# Patient Record
Sex: MALE | Race: BLACK OR AFRICAN AMERICAN | NOT HISPANIC OR LATINO
[De-identification: names, ages, dates, MRNs, and addresses within clinical notes are randomized per-mention and may not be internally consistent; named-entity substitution may affect disease eponyms.]

---

## 2017-02-22 ENCOUNTER — RX RENEWAL (OUTPATIENT)
Age: 77
End: 2017-02-22

## 2017-12-04 ENCOUNTER — APPOINTMENT (OUTPATIENT)
Dept: OPHTHALMOLOGY | Facility: CLINIC | Age: 77
End: 2017-12-04
Payer: MEDICARE

## 2017-12-04 PROCEDURE — 92012 INTRM OPH EXAM EST PATIENT: CPT

## 2018-05-26 ENCOUNTER — EMERGENCY (EMERGENCY)
Facility: HOSPITAL | Age: 78
LOS: 1 days | Discharge: LEFT BEFORE DISCHARGE | End: 2018-05-26
Attending: EMERGENCY MEDICINE | Admitting: EMERGENCY MEDICINE
Payer: MEDICARE

## 2018-05-26 VITALS
OXYGEN SATURATION: 98 % | HEART RATE: 75 BPM | DIASTOLIC BLOOD PRESSURE: 73 MMHG | RESPIRATION RATE: 18 BRPM | SYSTOLIC BLOOD PRESSURE: 134 MMHG | TEMPERATURE: 98 F

## 2018-05-26 VITALS
SYSTOLIC BLOOD PRESSURE: 179 MMHG | HEART RATE: 76 BPM | RESPIRATION RATE: 16 BRPM | OXYGEN SATURATION: 98 % | DIASTOLIC BLOOD PRESSURE: 98 MMHG

## 2018-05-26 LAB
ALBUMIN SERPL ELPH-MCNC: 3.8 G/DL — SIGNIFICANT CHANGE UP (ref 3.3–5)
ALP SERPL-CCNC: 77 U/L — SIGNIFICANT CHANGE UP (ref 40–120)
ALT FLD-CCNC: 7 U/L — SIGNIFICANT CHANGE UP (ref 4–41)
APPEARANCE UR: SIGNIFICANT CHANGE UP
APTT BLD: 30.5 SEC — SIGNIFICANT CHANGE UP (ref 27.5–37.4)
AST SERPL-CCNC: 12 U/L — SIGNIFICANT CHANGE UP (ref 4–40)
BACTERIA # UR AUTO: SIGNIFICANT CHANGE UP
BASE EXCESS BLDV CALC-SCNC: 1.4 MMOL/L — SIGNIFICANT CHANGE UP
BASOPHILS # BLD AUTO: 0.02 K/UL — SIGNIFICANT CHANGE UP (ref 0–0.2)
BASOPHILS NFR BLD AUTO: 0.4 % — SIGNIFICANT CHANGE UP (ref 0–2)
BILIRUB SERPL-MCNC: 0.4 MG/DL — SIGNIFICANT CHANGE UP (ref 0.2–1.2)
BILIRUB UR-MCNC: NEGATIVE — SIGNIFICANT CHANGE UP
BLOOD GAS VENOUS - CREATININE: 1.05 MG/DL — SIGNIFICANT CHANGE UP (ref 0.5–1.3)
BLOOD UR QL VISUAL: NEGATIVE — SIGNIFICANT CHANGE UP
BUN SERPL-MCNC: 16 MG/DL — SIGNIFICANT CHANGE UP (ref 7–23)
CALCIUM SERPL-MCNC: 8.6 MG/DL — SIGNIFICANT CHANGE UP (ref 8.4–10.5)
CHLORIDE BLDV-SCNC: 110 MMOL/L — HIGH (ref 96–108)
CHLORIDE SERPL-SCNC: 104 MMOL/L — SIGNIFICANT CHANGE UP (ref 98–107)
CK MB BLD-MCNC: 1.77 NG/ML — SIGNIFICANT CHANGE UP (ref 1–6.6)
CK MB BLD-MCNC: SIGNIFICANT CHANGE UP (ref 0–2.5)
CK SERPL-CCNC: 96 U/L — SIGNIFICANT CHANGE UP (ref 30–200)
CO2 SERPL-SCNC: 24 MMOL/L — SIGNIFICANT CHANGE UP (ref 22–31)
COLOR SPEC: YELLOW — SIGNIFICANT CHANGE UP
CREAT SERPL-MCNC: 1.11 MG/DL — SIGNIFICANT CHANGE UP (ref 0.5–1.3)
EOSINOPHIL # BLD AUTO: 0.1 K/UL — SIGNIFICANT CHANGE UP (ref 0–0.5)
EOSINOPHIL NFR BLD AUTO: 1.9 % — SIGNIFICANT CHANGE UP (ref 0–6)
GAS PNL BLDV: 140 MMOL/L — SIGNIFICANT CHANGE UP (ref 136–146)
GLUCOSE BLDV-MCNC: 173 — HIGH (ref 70–99)
GLUCOSE SERPL-MCNC: 177 MG/DL — HIGH (ref 70–99)
GLUCOSE UR-MCNC: 250 — SIGNIFICANT CHANGE UP
HBA1C BLD-MCNC: 7 % — HIGH (ref 4–5.6)
HCO3 BLDV-SCNC: 25 MMOL/L — SIGNIFICANT CHANGE UP (ref 20–27)
HCT VFR BLD CALC: 37.2 % — LOW (ref 39–50)
HCT VFR BLDV CALC: 39 % — SIGNIFICANT CHANGE UP (ref 39–51)
HGB BLD-MCNC: 11.8 G/DL — LOW (ref 13–17)
HGB BLDV-MCNC: 12.7 G/DL — LOW (ref 13–17)
HYALINE CASTS # UR AUTO: SIGNIFICANT CHANGE UP (ref 0–?)
IMM GRANULOCYTES # BLD AUTO: 0.01 # — SIGNIFICANT CHANGE UP
IMM GRANULOCYTES NFR BLD AUTO: 0.2 % — SIGNIFICANT CHANGE UP (ref 0–1.5)
INR BLD: 1.21 — HIGH (ref 0.88–1.17)
KETONES UR-MCNC: SIGNIFICANT CHANGE UP
LACTATE BLDV-MCNC: 1.5 MMOL/L — SIGNIFICANT CHANGE UP (ref 0.5–2)
LEUKOCYTE ESTERASE UR-ACNC: NEGATIVE — SIGNIFICANT CHANGE UP
LYMPHOCYTES # BLD AUTO: 0.95 K/UL — LOW (ref 1–3.3)
LYMPHOCYTES # BLD AUTO: 17.7 % — SIGNIFICANT CHANGE UP (ref 13–44)
MCHC RBC-ENTMCNC: 27.4 PG — SIGNIFICANT CHANGE UP (ref 27–34)
MCHC RBC-ENTMCNC: 31.7 % — LOW (ref 32–36)
MCV RBC AUTO: 86.5 FL — SIGNIFICANT CHANGE UP (ref 80–100)
MONOCYTES # BLD AUTO: 0.46 K/UL — SIGNIFICANT CHANGE UP (ref 0–0.9)
MONOCYTES NFR BLD AUTO: 8.6 % — SIGNIFICANT CHANGE UP (ref 2–14)
MUCOUS THREADS # UR AUTO: SIGNIFICANT CHANGE UP
NEUTROPHILS # BLD AUTO: 3.82 K/UL — SIGNIFICANT CHANGE UP (ref 1.8–7.4)
NEUTROPHILS NFR BLD AUTO: 71.2 % — SIGNIFICANT CHANGE UP (ref 43–77)
NITRITE UR-MCNC: NEGATIVE — SIGNIFICANT CHANGE UP
NRBC # FLD: 0 — SIGNIFICANT CHANGE UP
PCO2 BLDV: 48 MMHG — SIGNIFICANT CHANGE UP (ref 41–51)
PH BLDV: 7.36 PH — SIGNIFICANT CHANGE UP (ref 7.32–7.43)
PH UR: 5.5 — SIGNIFICANT CHANGE UP (ref 4.6–8)
PLATELET # BLD AUTO: 174 K/UL — SIGNIFICANT CHANGE UP (ref 150–400)
PMV BLD: 10.4 FL — SIGNIFICANT CHANGE UP (ref 7–13)
PO2 BLDV: 51 MMHG — HIGH (ref 35–40)
POTASSIUM BLDV-SCNC: 3.6 MMOL/L — SIGNIFICANT CHANGE UP (ref 3.4–4.5)
POTASSIUM SERPL-MCNC: 3.8 MMOL/L — SIGNIFICANT CHANGE UP (ref 3.5–5.3)
POTASSIUM SERPL-SCNC: 3.8 MMOL/L — SIGNIFICANT CHANGE UP (ref 3.5–5.3)
PROT SERPL-MCNC: 6.6 G/DL — SIGNIFICANT CHANGE UP (ref 6–8.3)
PROT UR-MCNC: 100 MG/DL — SIGNIFICANT CHANGE UP
PROTHROM AB SERPL-ACNC: 13.5 SEC — HIGH (ref 9.8–13.1)
RBC # BLD: 4.3 M/UL — SIGNIFICANT CHANGE UP (ref 4.2–5.8)
RBC # FLD: 14.4 % — SIGNIFICANT CHANGE UP (ref 10.3–14.5)
RBC CASTS # UR COMP ASSIST: SIGNIFICANT CHANGE UP (ref 0–?)
SAO2 % BLDV: 84.1 % — SIGNIFICANT CHANGE UP (ref 60–85)
SODIUM SERPL-SCNC: 141 MMOL/L — SIGNIFICANT CHANGE UP (ref 135–145)
SP GR SPEC: 1.03 — SIGNIFICANT CHANGE UP (ref 1–1.04)
SQUAMOUS # UR AUTO: SIGNIFICANT CHANGE UP
TROPONIN T SERPL-MCNC: < 0.06 NG/ML — SIGNIFICANT CHANGE UP (ref 0–0.06)
UROBILINOGEN FLD QL: 2 MG/DL — HIGH
WBC # BLD: 5.36 K/UL — SIGNIFICANT CHANGE UP (ref 3.8–10.5)
WBC # FLD AUTO: 5.36 K/UL — SIGNIFICANT CHANGE UP (ref 3.8–10.5)
WBC UR QL: SIGNIFICANT CHANGE UP (ref 0–?)

## 2018-05-26 PROCEDURE — 71045 X-RAY EXAM CHEST 1 VIEW: CPT | Mod: 26

## 2018-05-26 PROCEDURE — 71275 CT ANGIOGRAPHY CHEST: CPT | Mod: 26

## 2018-05-26 PROCEDURE — 93010 ELECTROCARDIOGRAM REPORT: CPT

## 2018-05-26 PROCEDURE — 74174 CTA ABD&PLVS W/CONTRAST: CPT | Mod: 26

## 2018-05-26 PROCEDURE — 99284 EMERGENCY DEPT VISIT MOD MDM: CPT | Mod: 25,GC

## 2018-05-26 RX ORDER — ASPIRIN/CALCIUM CARB/MAGNESIUM 324 MG
162 TABLET ORAL DAILY
Qty: 0 | Refills: 0 | Status: DISCONTINUED | OUTPATIENT
Start: 2018-05-26 | End: 2018-05-30

## 2018-05-26 RX ORDER — SODIUM CHLORIDE 9 MG/ML
1000 INJECTION INTRAMUSCULAR; INTRAVENOUS; SUBCUTANEOUS ONCE
Qty: 0 | Refills: 0 | Status: COMPLETED | OUTPATIENT
Start: 2018-05-26 | End: 2018-05-26

## 2018-05-26 RX ORDER — ASPIRIN/CALCIUM CARB/MAGNESIUM 324 MG
324 TABLET ORAL DAILY
Qty: 0 | Refills: 0 | Status: DISCONTINUED | OUTPATIENT
Start: 2018-05-26 | End: 2018-05-26

## 2018-05-26 RX ADMIN — SODIUM CHLORIDE 2000 MILLILITER(S): 9 INJECTION INTRAMUSCULAR; INTRAVENOUS; SUBCUTANEOUS at 17:04

## 2018-05-26 RX ADMIN — Medication 162 MILLIGRAM(S): at 17:54

## 2018-05-26 NOTE — ED PROVIDER NOTE - PROGRESS NOTE DETAILS
SHANTANU ATT: Patient notified at time of initial exam true syncope, abnl ekg, plan to admit patient to telemetry. 19:30 Patient returned from CTA, informed by wife patient will not stay. Will recommend wait for CTA result before AMA. Tanmay att: Expressed to patient, patient's wife and 2 family members that I would advise waiting for CTA make sure no internal bleeding before leaving AMA. Patient angry, "If my doctor wants me to be admitted then I will get admitted. Lady I'm gonna walk out of here." Patient aaox3, has capacity. Patient understands he is taking risk of death or permanent disability on himself and as the physician in charge his syncope and abnl ekg warrant admission. Patient demanding AMA papers. States he will see his PCP Breann. PCP Breann called, memorial day weekend, md not in, message left.

## 2018-05-26 NOTE — ED ADULT NURSE NOTE - CHIEF COMPLAINT
The patient is a 77y Male complaining of The patient is a 77y Male complaining of syncopal episode during bbq, x 5minutes, not witnessed.  Pmh of htn, dm non compliant with meds.  Denies cp, sob at the moment. The patient is a 77y Male complaining of syncopal episode during bbq, x 5minutes, not witnessed.  Pmh of htn, dm non compliant with meds.  Denies cp, sob at the moment.  Pt was given 2 asa and 1 sl nitro by ems.

## 2018-05-26 NOTE — ED ADULT TRIAGE NOTE - CHIEF COMPLAINT QUOTE
as per family pt had a syncopal episode that lasted approx 5 mins, and when he came to was altered. pt c/o left groin pain. noncompliant with DM and HTN medication. placed on 4L NC by EMS, 162asa and 1sl nitro, #18 S/L to right wrist. pt diaphoretic in triage.  MD Picc to triage for eval, no code stroke called.

## 2018-05-26 NOTE — ED PROVIDER NOTE - ATTENDING CONTRIBUTION TO CARE
Dr. Donato: I have personally seen and examined this patient at the bedside. I have fully participated in the care of this patient. I have reviewed all pertinent clinical information, including history, physical exam, plan and the Resident's note and agree except as noted. HPI above as by me. PE above as by me. Syncope and collapse, ekg rbb lateral st abnl PLAN labs, xr DISPO admit to telemetry

## 2018-05-26 NOTE — ED ADULT NURSE NOTE - PMH
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  Glaucoma    [place] (hard of hearing)

## 2018-05-26 NOTE — ED ADULT NURSE NOTE - OBJECTIVE STATEMENT
Alert, awake, oriented x3.  EKG - NSR, RBBB.  Placed onto cm.  Seen by MD Donato and Candido.  TBA. Alert, awake, oriented x3.  EKG - NSR, RBBB.  Placed onto cm.  Seen by MD Donato and Candido.  162mg asa given and 1 Liter of NS completed.  TBA.

## 2018-05-26 NOTE — ED PROVIDER NOTE - OBJECTIVE STATEMENT
16:27 Tanmay att: 77M h/o htn noncompliant, dm noncompliant BIBEMS ams and syncope. Per wife earlier today patient himself, c/o lt groin pain, sat down during a bbq. Unwitnessed syncope, daughter found him slumped and unconscious x 5 minutes. No seizure activity noted and no head trauma. Initially drowsy and groggy. EMS arrived gave orange juice, fingerstick afterward was normal, per wife ems bp nl but ems ekg not. Patient nad, aaox3, he recalls left groin pain, "like a pulled a muscle," no bulging mass, does not recall syncope. Per triage note EMS administered 4L NC, 162asa, 1sl nitro, and #18 S/L to right wrist. PMH PSH MED ALL SMOKE PCP PHARMACY 16:27 Tanmay att: 77M h/o htn noncompliant, dm noncompliant BIBEMS ams and syncope. Per wife earlier today patient himself, c/o lt groin pain, sat down during a bbq. Unwitnessed syncope, daughter found him slumped and unconscious x 5 minutes. No seizure activity noted and no head trauma. Initially drowsy and groggy. EMS arrived gave orange juice, fingerstick afterward was normal, per wife ems bp nl but ems ekg not. Patient nad, aaox3, he recalls left groin pain, "like a pulled a muscle," no bulging mass, does not recall syncope. Denies cp, sob, palpitations. Denies fever, dysuria. Per triage note EMS administered 4L NC, 162asa, 1sl nitro, and #18 S/L to right wrist. PMH htn, dm. Neg h/o seizure or syncope PSH b/l cataract, corneal transplant, hand sx MED ALL x SMOKE PCP PHARMACY

## 2018-05-26 NOTE — ED PROVIDER NOTE - PMH
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  Glaucoma    [place] (hard of hearing)

## 2018-05-26 NOTE — ED PROVIDER NOTE - PSH
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

## 2018-05-28 LAB
BACTERIA UR CULT: SIGNIFICANT CHANGE UP
SPECIMEN SOURCE: SIGNIFICANT CHANGE UP

## 2018-05-29 NOTE — ED POST DISCHARGE NOTE - RESULT SUMMARY
KEVIN Thomson: UCX contaminated. Pt seen for syncope and left AMA. Called [phone], spoke to patients wife who took our admin PA number and said they would call back once the pt is home.

## 2018-06-04 ENCOUNTER — APPOINTMENT (OUTPATIENT)
Dept: OPHTHALMOLOGY | Facility: CLINIC | Age: 78
End: 2018-06-04
Payer: MEDICARE

## 2018-06-04 PROCEDURE — 92014 COMPRE OPH EXAM EST PT 1/>: CPT

## 2018-08-03 ENCOUNTER — MEDICATION RENEWAL (OUTPATIENT)
Age: 78
End: 2018-08-03

## 2018-08-03 RX ORDER — MOXIFLOXACIN OPHTHALMIC 5 MG/ML
0.5 SOLUTION/ DROPS OPHTHALMIC 4 TIMES DAILY
Qty: 1 | Refills: 2 | Status: ACTIVE | COMMUNITY
Start: 2018-08-03 | End: 1900-01-01

## 2018-08-03 RX ORDER — PREDNISOLONE ACETATE 10 MG/ML
1 SUSPENSION/ DROPS OPHTHALMIC
Qty: 1 | Refills: 3 | Status: ACTIVE | COMMUNITY
Start: 2018-08-03 | End: 1900-01-01

## 2018-08-06 ENCOUNTER — APPOINTMENT (OUTPATIENT)
Dept: OPHTHALMOLOGY | Facility: CLINIC | Age: 78
End: 2018-08-06
Payer: MEDICARE

## 2018-08-06 DIAGNOSIS — Z94.7 CORNEAL TRANSPLANT STATUS: ICD-10-CM

## 2018-08-06 PROCEDURE — 92014 COMPRE OPH EXAM EST PT 1/>: CPT

## 2018-08-09 ENCOUNTER — APPOINTMENT (OUTPATIENT)
Dept: OPHTHALMOLOGY | Facility: HOSPITAL | Age: 78
End: 2018-08-09

## 2018-08-09 ENCOUNTER — OUTPATIENT (OUTPATIENT)
Dept: OUTPATIENT SERVICES | Facility: HOSPITAL | Age: 78
LOS: 1 days | End: 2018-08-09
Payer: MEDICARE

## 2018-08-09 PROCEDURE — 82962 GLUCOSE BLOOD TEST: CPT

## 2018-08-10 ENCOUNTER — APPOINTMENT (OUTPATIENT)
Dept: OPHTHALMOLOGY | Facility: CLINIC | Age: 78
End: 2018-08-10

## 2018-08-13 ENCOUNTER — APPOINTMENT (OUTPATIENT)
Dept: OPHTHALMOLOGY | Facility: CLINIC | Age: 78
End: 2018-08-13
Payer: MEDICARE

## 2018-08-13 PROCEDURE — 92012 INTRM OPH EXAM EST PATIENT: CPT

## 2018-08-23 DIAGNOSIS — H18.12 BULLOUS KERATOPATHY, LEFT EYE: ICD-10-CM

## 2018-09-14 ENCOUNTER — EMERGENCY (EMERGENCY)
Facility: HOSPITAL | Age: 78
LOS: 1 days | Discharge: HOME | End: 2018-09-14
Attending: EMERGENCY MEDICINE | Admitting: EMERGENCY MEDICINE
Payer: MEDICARE

## 2018-09-14 VITALS
HEART RATE: 77 BPM | TEMPERATURE: 98 F | DIASTOLIC BLOOD PRESSURE: 56 MMHG | RESPIRATION RATE: 18 BRPM | SYSTOLIC BLOOD PRESSURE: 143 MMHG | OXYGEN SATURATION: 99 %

## 2018-09-14 VITALS
SYSTOLIC BLOOD PRESSURE: 137 MMHG | RESPIRATION RATE: 18 BRPM | TEMPERATURE: 98 F | HEART RATE: 66 BPM | DIASTOLIC BLOOD PRESSURE: 70 MMHG | OXYGEN SATURATION: 100 %

## 2018-09-14 LAB
ALBUMIN SERPL ELPH-MCNC: 3.8 G/DL — SIGNIFICANT CHANGE UP (ref 3.3–5)
ALP SERPL-CCNC: 70 U/L — SIGNIFICANT CHANGE UP (ref 40–120)
ALT FLD-CCNC: 8 U/L — SIGNIFICANT CHANGE UP (ref 4–41)
APTT BLD: 20.1 SEC — LOW (ref 27.5–37.4)
AST SERPL-CCNC: 18 U/L — SIGNIFICANT CHANGE UP (ref 4–40)
BASOPHILS # BLD AUTO: 0.01 K/UL — SIGNIFICANT CHANGE UP (ref 0–0.2)
BASOPHILS NFR BLD AUTO: 0.1 % — SIGNIFICANT CHANGE UP (ref 0–2)
BILIRUB SERPL-MCNC: 0.4 MG/DL — SIGNIFICANT CHANGE UP (ref 0.2–1.2)
BUN SERPL-MCNC: 20 MG/DL — SIGNIFICANT CHANGE UP (ref 7–23)
CALCIUM SERPL-MCNC: 8.4 MG/DL — SIGNIFICANT CHANGE UP (ref 8.4–10.5)
CHLORIDE SERPL-SCNC: 108 MMOL/L — HIGH (ref 98–107)
CO2 SERPL-SCNC: 20 MMOL/L — LOW (ref 22–31)
CREAT SERPL-MCNC: 1.15 MG/DL — SIGNIFICANT CHANGE UP (ref 0.5–1.3)
EOSINOPHIL # BLD AUTO: 0.09 K/UL — SIGNIFICANT CHANGE UP (ref 0–0.5)
EOSINOPHIL NFR BLD AUTO: 1.3 % — SIGNIFICANT CHANGE UP (ref 0–6)
GLUCOSE SERPL-MCNC: 150 MG/DL — HIGH (ref 70–99)
HCT VFR BLD CALC: 43.3 % — SIGNIFICANT CHANGE UP (ref 39–50)
HGB BLD-MCNC: 13.7 G/DL — SIGNIFICANT CHANGE UP (ref 13–17)
IMM GRANULOCYTES # BLD AUTO: 0.01 # — SIGNIFICANT CHANGE UP
IMM GRANULOCYTES NFR BLD AUTO: 0.1 % — SIGNIFICANT CHANGE UP (ref 0–1.5)
INR BLD: 1.11 — SIGNIFICANT CHANGE UP (ref 0.88–1.17)
LYMPHOCYTES # BLD AUTO: 1.3 K/UL — SIGNIFICANT CHANGE UP (ref 1–3.3)
LYMPHOCYTES # BLD AUTO: 19.3 % — SIGNIFICANT CHANGE UP (ref 13–44)
MANUAL SMEAR VERIFICATION: YES — SIGNIFICANT CHANGE UP
MCHC RBC-ENTMCNC: 28.5 PG — SIGNIFICANT CHANGE UP (ref 27–34)
MCHC RBC-ENTMCNC: 31.6 % — LOW (ref 32–36)
MCV RBC AUTO: 90.2 FL — SIGNIFICANT CHANGE UP (ref 80–100)
MONOCYTES # BLD AUTO: 0.48 K/UL — SIGNIFICANT CHANGE UP (ref 0–0.9)
MONOCYTES NFR BLD AUTO: 7.1 % — SIGNIFICANT CHANGE UP (ref 2–14)
NEUTROPHILS # BLD AUTO: 4.84 K/UL — SIGNIFICANT CHANGE UP (ref 1.8–7.4)
NEUTROPHILS NFR BLD AUTO: 72.1 % — SIGNIFICANT CHANGE UP (ref 43–77)
NRBC # FLD: 0 — SIGNIFICANT CHANGE UP
PLATELET # BLD AUTO: 139 K/UL — LOW (ref 150–400)
PLATELET CLUMP BLD QL SMEAR: SLIGHT — SIGNIFICANT CHANGE UP
PLATELET COUNT - ESTIMATE: NORMAL — SIGNIFICANT CHANGE UP
PMV BLD: 11.3 FL — SIGNIFICANT CHANGE UP (ref 7–13)
POTASSIUM SERPL-MCNC: 4.3 MMOL/L — SIGNIFICANT CHANGE UP (ref 3.5–5.3)
POTASSIUM SERPL-SCNC: 4.3 MMOL/L — SIGNIFICANT CHANGE UP (ref 3.5–5.3)
PROT SERPL-MCNC: 6.8 G/DL — SIGNIFICANT CHANGE UP (ref 6–8.3)
PROTHROM AB SERPL-ACNC: 12.3 SEC — SIGNIFICANT CHANGE UP (ref 9.8–13.1)
RBC # BLD: 4.8 M/UL — SIGNIFICANT CHANGE UP (ref 4.2–5.8)
RBC # FLD: 14.8 % — HIGH (ref 10.3–14.5)
SODIUM SERPL-SCNC: 143 MMOL/L — SIGNIFICANT CHANGE UP (ref 135–145)
TROPONIN T, HIGH SENSITIVITY: 11 NG/L — SIGNIFICANT CHANGE UP (ref ?–14)
WBC # BLD: 6.73 K/UL — SIGNIFICANT CHANGE UP (ref 3.8–10.5)
WBC # FLD AUTO: 6.73 K/UL — SIGNIFICANT CHANGE UP (ref 3.8–10.5)

## 2018-09-14 PROCEDURE — 70450 CT HEAD/BRAIN W/O DYE: CPT | Mod: 26

## 2018-09-14 PROCEDURE — 99285 EMERGENCY DEPT VISIT HI MDM: CPT | Mod: GC

## 2018-09-14 RX ORDER — TETANUS TOXOID, REDUCED DIPHTHERIA TOXOID AND ACELLULAR PERTUSSIS VACCINE, ADSORBED 5; 2.5; 8; 8; 2.5 [IU]/.5ML; [IU]/.5ML; UG/.5ML; UG/.5ML; UG/.5ML
0.5 SUSPENSION INTRAMUSCULAR ONCE
Qty: 0 | Refills: 0 | Status: COMPLETED | OUTPATIENT
Start: 2018-09-14 | End: 2018-09-14

## 2018-09-14 RX ADMIN — TETANUS TOXOID, REDUCED DIPHTHERIA TOXOID AND ACELLULAR PERTUSSIS VACCINE, ADSORBED 0.5 MILLILITER(S): 5; 2.5; 8; 8; 2.5 SUSPENSION INTRAMUSCULAR at 22:01

## 2018-09-14 NOTE — ED PROVIDER NOTE - PLAN OF CARE
1) Please return to the ED should you have any new or worsening symptoms, worsening pain, develop chest pain, pass out again, or any concerning symptoms  2) Please follow up with Dr. Marques on Monday   3) Please stop taking ENtresto

## 2018-09-14 NOTE — ED PROVIDER NOTE - EYES, MLM
Clear bilaterally, pupils equal, round and reactive to light. EOMI. No scleral icterus. No conjunctival injection. No discharge bilaterally. No nystagmus appreciated bilaterally.

## 2018-09-14 NOTE — ED PROVIDER NOTE - PROGRESS NOTE DETAILS
Dre Mauro (Resident): SPoke with Dr. Marques - saw patient a few days ago - recommended if lytes WNL, not orthostatic, should hold entresto and call his office to be seen on MOnday Dre Mauro (Resident): not orthostatic - d/w family return precautions including CP or SOB - looks well, safe to d/c - will call Dr. DUNBAR on MOnday for apt

## 2018-09-14 NOTE — ED ADULT TRIAGE NOTE - CHIEF COMPLAINT QUOTE
Patient brought to ER from home by EMS for c/o syncopal episode times two. Pt was pale and diaphoretic when BLS first arrived. 20 gauge IVL in left AC with NS infusing and he has a hematoma to forehead after he hit his head. Pt had an episode of urinary incontinence also.  . Vomited times one.

## 2018-09-14 NOTE — ED PROVIDER NOTE - MEDICAL DECISION MAKING DETAILS
Dre Mauro (Resident): 78 y/o male hx of HTN recently started ENtresto p/w syncope 2 hours after taking first dose - back to baseline now, was diaphoretic and urinated self, likely 2/2 to low BP - low concern for PE - did hit head, will CT - no CP or palpitations prior to fall, unlikely MI or arrhythmia -

## 2018-09-14 NOTE — ED ADULT NURSE NOTE - NSIMPLEMENTINTERV_GEN_ALL_ED
Implemented All Fall Risk Interventions:  [place] to call system. Call bell, personal items and telephone within reach. Instruct patient to call for assistance. Room bathroom lighting operational. Non-slip footwear when patient is off stretcher. Physically safe environment: no spills, clutter or unnecessary equipment. Stretcher in lowest position, wheels locked, appropriate side rails in place. Provide visual cue, wrist band, yellow gown, etc. Monitor gait and stability. Monitor for mental status changes and reorient to person, place, and time. Review medications for side effects contributing to fall risk. Reinforce activity limits and safety measures with patient and family.

## 2018-09-14 NOTE — ED PROVIDER NOTE - CARE PLAN
Principal Discharge DX:	Syncope and collapse  Assessment and plan of treatment:	1) Please return to the ED should you have any new or worsening symptoms, worsening pain, develop chest pain, pass out again, or any concerning symptoms  2) Please follow up with Dr. Marques on Monday   3) Please stop taking ENtresto

## 2018-09-14 NOTE — ED PROVIDER NOTE - ATTENDING CONTRIBUTION TO CARE
Attending Note (Ree): patient with syncope.  started an hour after taking new medication, known side effect of which is syncope.  asymptomatic now.  hematoma on right forehead.  ct head, labs, ekg.

## 2018-09-14 NOTE — ED ADULT NURSE NOTE - OBJECTIVE STATEMENT
pt A&Ox3 c/o syncopal episode while at home, pt states he was sitting when he felt light headed and fell, as per wife she heard a thump and when she came down stairs pt was stiff but talking with her, wife also states pt had soiled himself  pt was recently placed on new BP medications, reports blurry vision and dizziness, hematoma with laceration noted to right side of forehead, denies n/v/d MD heller at bedside

## 2018-09-14 NOTE — ED PROVIDER NOTE - OBJECTIVE STATEMENT
76 y/o male hx HTN p/w syncope. Patient recently admitted for syncope. Patient recently started on Entresto and states took it 2 hours ago and then felt himself get abd pain 76 y/o male hx HTN p/w syncope. Patient recently admitted for syncope. Patient recently started on Entresto and states took it 2 hours ago and then felt himself get abd pain and lightheaded, sat in chair, had syncopal event, hit the floor. EMS arrived, patient mentating, FS was 160, then had syncopal event again. Clammy and diaphoretic, EMS lowered patient to floor and improved. PAtient had no shaking episodes but did urinate himself. No CP, SOB, N/V/D, abd pain - looks well now, small lac on the R forehead. Not on AC. 78 y/o male hx HTN p/w syncope. Patient recently admitted for syncope. Patient recently started on Entresto and states took it 2 hours ago and then felt himself get abd pain and lightheaded, sat in chair, had syncopal event, hit the floor. EMS arrived, patient mentating, FS was 160, then had syncopal event again. Clammy and diaphoretic, EMS lowered patient to floor and improved. PAtient had no shaking episodes but did urinate himself. No CP, SOB, N/V/D, abd pain - looks well now, small lac on the R forehead. Not on AC.  PMD: Shelli (Darci)

## 2018-09-16 PROBLEM — E78.5 HYPERLIPIDEMIA, UNSPECIFIED: Chronic | Status: ACTIVE | Noted: 2018-08-09

## 2018-11-07 ENCOUNTER — APPOINTMENT (OUTPATIENT)
Dept: OPHTHALMOLOGY | Facility: CLINIC | Age: 78
End: 2018-11-07
Payer: MEDICARE

## 2018-11-07 PROCEDURE — 92012 INTRM OPH EXAM EST PATIENT: CPT

## 2019-03-06 ENCOUNTER — APPOINTMENT (OUTPATIENT)
Dept: OPHTHALMOLOGY | Facility: CLINIC | Age: 79
End: 2019-03-06
Payer: MEDICARE

## 2019-03-06 DIAGNOSIS — H18.232 SECONDARY CORNEAL EDEMA, LEFT EYE: ICD-10-CM

## 2019-03-06 DIAGNOSIS — H40.9 UNSPECIFIED GLAUCOMA: ICD-10-CM

## 2019-03-06 PROCEDURE — 92012 INTRM OPH EXAM EST PATIENT: CPT

## 2019-07-05 ENCOUNTER — APPOINTMENT (OUTPATIENT)
Dept: OPHTHALMOLOGY | Facility: CLINIC | Age: 79
End: 2019-07-05

## 2020-11-02 ENCOUNTER — APPOINTMENT (OUTPATIENT)
Dept: OPHTHALMOLOGY | Facility: CLINIC | Age: 80
End: 2020-11-02
Payer: MEDICARE

## 2020-11-02 ENCOUNTER — NON-APPOINTMENT (OUTPATIENT)
Age: 80
End: 2020-11-02

## 2020-11-02 PROCEDURE — 92012 INTRM OPH EXAM EST PATIENT: CPT

## 2023-01-01 ENCOUNTER — INPATIENT (INPATIENT)
Facility: HOSPITAL | Age: 83
LOS: 11 days | End: 2023-01-25
Attending: STUDENT IN AN ORGANIZED HEALTH CARE EDUCATION/TRAINING PROGRAM | Admitting: INTERNAL MEDICINE
Payer: MEDICARE

## 2023-01-01 VITALS
HEART RATE: 59 BPM | DIASTOLIC BLOOD PRESSURE: 52 MMHG | SYSTOLIC BLOOD PRESSURE: 108 MMHG | TEMPERATURE: 98 F | RESPIRATION RATE: 32 BRPM | OXYGEN SATURATION: 98 %

## 2023-01-01 DIAGNOSIS — N17.9 ACUTE KIDNEY FAILURE, UNSPECIFIED: ICD-10-CM

## 2023-01-01 DIAGNOSIS — E11.9 TYPE 2 DIABETES MELLITUS WITHOUT COMPLICATIONS: ICD-10-CM

## 2023-01-01 DIAGNOSIS — I10 ESSENTIAL (PRIMARY) HYPERTENSION: ICD-10-CM

## 2023-01-01 DIAGNOSIS — I50.22 CHRONIC SYSTOLIC (CONGESTIVE) HEART FAILURE: ICD-10-CM

## 2023-01-01 DIAGNOSIS — E78.5 HYPERLIPIDEMIA, UNSPECIFIED: ICD-10-CM

## 2023-01-01 DIAGNOSIS — Z29.9 ENCOUNTER FOR PROPHYLACTIC MEASURES, UNSPECIFIED: ICD-10-CM

## 2023-01-01 DIAGNOSIS — J18.9 PNEUMONIA, UNSPECIFIED ORGANISM: ICD-10-CM

## 2023-01-01 DIAGNOSIS — J96.00 ACUTE RESPIRATORY FAILURE, UNSPECIFIED WHETHER WITH HYPOXIA OR HYPERCAPNIA: ICD-10-CM

## 2023-01-01 DIAGNOSIS — U07.1 COVID-19: ICD-10-CM

## 2023-01-01 DIAGNOSIS — G93.41 METABOLIC ENCEPHALOPATHY: ICD-10-CM

## 2023-01-01 LAB
A1C WITH ESTIMATED AVERAGE GLUCOSE RESULT: 6.3 % — HIGH (ref 4–5.6)
ACANTHOCYTES BLD QL SMEAR: SIGNIFICANT CHANGE UP
ALBUMIN SERPL ELPH-MCNC: 1.7 G/DL — LOW (ref 3.3–5)
ALBUMIN SERPL ELPH-MCNC: 1.8 G/DL — LOW (ref 3.3–5)
ALBUMIN SERPL ELPH-MCNC: 1.9 G/DL — LOW (ref 3.3–5)
ALBUMIN SERPL ELPH-MCNC: 2 G/DL — LOW (ref 3.3–5)
ALBUMIN SERPL ELPH-MCNC: 2.1 G/DL — LOW (ref 3.3–5)
ALBUMIN SERPL ELPH-MCNC: 2.2 G/DL — LOW (ref 3.3–5)
ALBUMIN SERPL ELPH-MCNC: 2.2 G/DL — LOW (ref 3.3–5)
ALBUMIN SERPL ELPH-MCNC: 2.3 G/DL — LOW (ref 3.3–5)
ALBUMIN SERPL ELPH-MCNC: 2.4 G/DL — LOW (ref 3.3–5)
ALBUMIN SERPL ELPH-MCNC: 2.4 G/DL — LOW (ref 3.3–5)
ALBUMIN SERPL ELPH-MCNC: 3.1 G/DL — LOW (ref 3.3–5)
ALBUMIN SERPL ELPH-MCNC: 3.5 G/DL — SIGNIFICANT CHANGE UP (ref 3.3–5)
ALBUMIN SERPL ELPH-MCNC: 3.8 G/DL — SIGNIFICANT CHANGE UP (ref 3.3–5)
ALP SERPL-CCNC: 106 U/L — SIGNIFICANT CHANGE UP (ref 40–120)
ALP SERPL-CCNC: 108 U/L — SIGNIFICANT CHANGE UP (ref 40–120)
ALP SERPL-CCNC: 108 U/L — SIGNIFICANT CHANGE UP (ref 40–120)
ALP SERPL-CCNC: 28 U/L — LOW (ref 40–120)
ALP SERPL-CCNC: 32 U/L — LOW (ref 40–120)
ALP SERPL-CCNC: 33 U/L — LOW (ref 40–120)
ALP SERPL-CCNC: 34 U/L — LOW (ref 40–120)
ALP SERPL-CCNC: 40 U/L — SIGNIFICANT CHANGE UP (ref 40–120)
ALP SERPL-CCNC: 56 U/L — SIGNIFICANT CHANGE UP (ref 40–120)
ALP SERPL-CCNC: 60 U/L — SIGNIFICANT CHANGE UP (ref 40–120)
ALP SERPL-CCNC: 71 U/L — SIGNIFICANT CHANGE UP (ref 40–120)
ALP SERPL-CCNC: 83 U/L — SIGNIFICANT CHANGE UP (ref 40–120)
ALP SERPL-CCNC: 90 U/L — SIGNIFICANT CHANGE UP (ref 40–120)
ALT FLD-CCNC: 119 U/L — HIGH (ref 4–41)
ALT FLD-CCNC: 12 U/L — SIGNIFICANT CHANGE UP (ref 4–41)
ALT FLD-CCNC: 139 U/L — HIGH (ref 4–41)
ALT FLD-CCNC: 143 U/L — HIGH (ref 4–41)
ALT FLD-CCNC: 152 U/L — HIGH (ref 4–41)
ALT FLD-CCNC: 159 U/L — HIGH (ref 4–41)
ALT FLD-CCNC: 18 U/L — SIGNIFICANT CHANGE UP (ref 4–41)
ALT FLD-CCNC: 19 U/L — SIGNIFICANT CHANGE UP (ref 4–41)
ALT FLD-CCNC: 21 U/L — SIGNIFICANT CHANGE UP (ref 4–41)
ALT FLD-CCNC: 21 U/L — SIGNIFICANT CHANGE UP (ref 4–41)
ALT FLD-CCNC: 22 U/L — SIGNIFICANT CHANGE UP (ref 4–41)
ALT FLD-CCNC: 70 U/L — HIGH (ref 4–41)
ALT FLD-CCNC: 94 U/L — HIGH (ref 4–41)
ANION GAP SERPL CALC-SCNC: 15 MMOL/L — HIGH (ref 7–14)
ANION GAP SERPL CALC-SCNC: 17 MMOL/L — HIGH (ref 7–14)
ANION GAP SERPL CALC-SCNC: 17 MMOL/L — HIGH (ref 7–14)
ANION GAP SERPL CALC-SCNC: 18 MMOL/L — HIGH (ref 7–14)
ANION GAP SERPL CALC-SCNC: 19 MMOL/L — HIGH (ref 7–14)
ANION GAP SERPL CALC-SCNC: 20 MMOL/L — HIGH (ref 7–14)
ANION GAP SERPL CALC-SCNC: 20 MMOL/L — HIGH (ref 7–14)
ANION GAP SERPL CALC-SCNC: 21 MMOL/L — HIGH (ref 7–14)
ANION GAP SERPL CALC-SCNC: 23 MMOL/L — HIGH (ref 7–14)
ANISOCYTOSIS BLD QL: SLIGHT — SIGNIFICANT CHANGE UP
ANISOCYTOSIS BLD QL: SLIGHT — SIGNIFICANT CHANGE UP
APPEARANCE UR: ABNORMAL
APTT BLD: 32.4 SEC — SIGNIFICANT CHANGE UP (ref 27–36.3)
APTT BLD: 36.1 SEC — SIGNIFICANT CHANGE UP (ref 27–36.3)
APTT BLD: 36.6 SEC — HIGH (ref 27–36.3)
APTT BLD: 38.6 SEC — HIGH (ref 27–36.3)
APTT BLD: 40.9 SEC — HIGH (ref 27–36.3)
APTT BLD: 54.5 SEC — HIGH (ref 27–36.3)
APTT BLD: 55.5 SEC — HIGH (ref 27–36.3)
APTT BLD: 66.2 SEC — HIGH (ref 27–36.3)
APTT BLD: 76.7 SEC — HIGH (ref 27–36.3)
AST SERPL-CCNC: 129 U/L — HIGH (ref 4–40)
AST SERPL-CCNC: 131 U/L — HIGH (ref 4–40)
AST SERPL-CCNC: 213 U/L — HIGH (ref 4–40)
AST SERPL-CCNC: 237 U/L — HIGH (ref 4–40)
AST SERPL-CCNC: 259 U/L — HIGH (ref 4–40)
AST SERPL-CCNC: 288 U/L — HIGH (ref 4–40)
AST SERPL-CCNC: 328 U/L — HIGH (ref 4–40)
AST SERPL-CCNC: 416 U/L — HIGH (ref 4–40)
AST SERPL-CCNC: 462 U/L — HIGH (ref 4–40)
AST SERPL-CCNC: 59 U/L — HIGH (ref 4–40)
AST SERPL-CCNC: 93 U/L — HIGH (ref 4–40)
AST SERPL-CCNC: 94 U/L — HIGH (ref 4–40)
AST SERPL-CCNC: 97 U/L — HIGH (ref 4–40)
BACTERIA # UR AUTO: NEGATIVE — SIGNIFICANT CHANGE UP
BASE EXCESS BLDV CALC-SCNC: -10.5 MMOL/L — LOW (ref -2–3)
BASE EXCESS BLDV CALC-SCNC: -4 MMOL/L — LOW (ref -2–3)
BASE EXCESS BLDV CALC-SCNC: -4.8 MMOL/L — LOW (ref -2–3)
BASOPHILS # BLD AUTO: 0 K/UL — SIGNIFICANT CHANGE UP (ref 0–0.2)
BASOPHILS # BLD AUTO: 0.02 K/UL — SIGNIFICANT CHANGE UP (ref 0–0.2)
BASOPHILS # BLD AUTO: 0.03 K/UL — SIGNIFICANT CHANGE UP (ref 0–0.2)
BASOPHILS # BLD AUTO: 0.04 K/UL — SIGNIFICANT CHANGE UP (ref 0–0.2)
BASOPHILS # BLD AUTO: 0.05 K/UL — SIGNIFICANT CHANGE UP (ref 0–0.2)
BASOPHILS # BLD AUTO: 0.05 K/UL — SIGNIFICANT CHANGE UP (ref 0–0.2)
BASOPHILS # BLD AUTO: 0.06 K/UL — SIGNIFICANT CHANGE UP (ref 0–0.2)
BASOPHILS # BLD AUTO: 0.08 K/UL — SIGNIFICANT CHANGE UP (ref 0–0.2)
BASOPHILS NFR BLD AUTO: 0 % — SIGNIFICANT CHANGE UP (ref 0–2)
BASOPHILS NFR BLD AUTO: 0.2 % — SIGNIFICANT CHANGE UP (ref 0–2)
BASOPHILS NFR BLD AUTO: 0.3 % — SIGNIFICANT CHANGE UP (ref 0–2)
BASOPHILS NFR BLD AUTO: 0.4 % — SIGNIFICANT CHANGE UP (ref 0–2)
BASOPHILS NFR BLD AUTO: 0.5 % — SIGNIFICANT CHANGE UP (ref 0–2)
BASOPHILS NFR BLD AUTO: 0.8 % — SIGNIFICANT CHANGE UP (ref 0–2)
BILIRUB DIRECT SERPL-MCNC: 1.5 MG/DL — HIGH (ref 0–0.3)
BILIRUB SERPL-MCNC: 0.5 MG/DL — SIGNIFICANT CHANGE UP (ref 0.2–1.2)
BILIRUB SERPL-MCNC: 0.8 MG/DL — SIGNIFICANT CHANGE UP (ref 0.2–1.2)
BILIRUB SERPL-MCNC: 0.8 MG/DL — SIGNIFICANT CHANGE UP (ref 0.2–1.2)
BILIRUB SERPL-MCNC: 0.9 MG/DL — SIGNIFICANT CHANGE UP (ref 0.2–1.2)
BILIRUB SERPL-MCNC: 1 MG/DL — SIGNIFICANT CHANGE UP (ref 0.2–1.2)
BILIRUB SERPL-MCNC: 1.3 MG/DL — HIGH (ref 0.2–1.2)
BILIRUB SERPL-MCNC: 1.6 MG/DL — HIGH (ref 0.2–1.2)
BILIRUB SERPL-MCNC: 1.8 MG/DL — HIGH (ref 0.2–1.2)
BILIRUB SERPL-MCNC: 1.9 MG/DL — HIGH (ref 0.2–1.2)
BILIRUB SERPL-MCNC: 2.1 MG/DL — HIGH (ref 0.2–1.2)
BILIRUB SERPL-MCNC: 2.1 MG/DL — HIGH (ref 0.2–1.2)
BILIRUB UR-MCNC: NEGATIVE — SIGNIFICANT CHANGE UP
BLD GP AB SCN SERPL QL: NEGATIVE — SIGNIFICANT CHANGE UP
BLD GP AB SCN SERPL QL: NEGATIVE — SIGNIFICANT CHANGE UP
BLOOD GAS ARTERIAL - LYTES,HGB,ICA,LACT RESULT: SIGNIFICANT CHANGE UP
BLOOD GAS ARTERIAL COMPREHENSIVE RESULT: SIGNIFICANT CHANGE UP
BLOOD GAS VENOUS COMPREHENSIVE RESULT: SIGNIFICANT CHANGE UP
BUN SERPL-MCNC: 110 MG/DL — HIGH (ref 7–23)
BUN SERPL-MCNC: 25 MG/DL — HIGH (ref 7–23)
BUN SERPL-MCNC: 40 MG/DL — HIGH (ref 7–23)
BUN SERPL-MCNC: 49 MG/DL — HIGH (ref 7–23)
BUN SERPL-MCNC: 52 MG/DL — HIGH (ref 7–23)
BUN SERPL-MCNC: 55 MG/DL — HIGH (ref 7–23)
BUN SERPL-MCNC: 55 MG/DL — HIGH (ref 7–23)
BUN SERPL-MCNC: 56 MG/DL — HIGH (ref 7–23)
BUN SERPL-MCNC: 57 MG/DL — HIGH (ref 7–23)
BUN SERPL-MCNC: 57 MG/DL — HIGH (ref 7–23)
BUN SERPL-MCNC: 59 MG/DL — HIGH (ref 7–23)
BUN SERPL-MCNC: 76 MG/DL — HIGH (ref 7–23)
BUN SERPL-MCNC: 91 MG/DL — HIGH (ref 7–23)
BURR CELLS BLD QL SMEAR: PRESENT — SIGNIFICANT CHANGE UP
CA-I BLD-SCNC: 0.77 MMOL/L — LOW (ref 1.15–1.29)
CA-I BLD-SCNC: 0.79 MMOL/L — LOW (ref 1.15–1.29)
CA-I BLD-SCNC: 0.88 MMOL/L — LOW (ref 1.15–1.29)
CA-I BLD-SCNC: 0.91 MMOL/L — LOW (ref 1.15–1.29)
CALCIUM SERPL-MCNC: 6.4 MG/DL — CRITICAL LOW (ref 8.4–10.5)
CALCIUM SERPL-MCNC: 6.6 MG/DL — LOW (ref 8.4–10.5)
CALCIUM SERPL-MCNC: 6.7 MG/DL — LOW (ref 8.4–10.5)
CALCIUM SERPL-MCNC: 6.8 MG/DL — LOW (ref 8.4–10.5)
CALCIUM SERPL-MCNC: 6.9 MG/DL — LOW (ref 8.4–10.5)
CALCIUM SERPL-MCNC: 6.9 MG/DL — LOW (ref 8.4–10.5)
CALCIUM SERPL-MCNC: 7 MG/DL — LOW (ref 8.4–10.5)
CALCIUM SERPL-MCNC: 7.1 MG/DL — LOW (ref 8.4–10.5)
CALCIUM SERPL-MCNC: 7.3 MG/DL — LOW (ref 8.4–10.5)
CALCIUM SERPL-MCNC: 8 MG/DL — LOW (ref 8.4–10.5)
CALCIUM SERPL-MCNC: 8.6 MG/DL — SIGNIFICANT CHANGE UP (ref 8.4–10.5)
CHLORIDE BLDV-SCNC: 100 MMOL/L — SIGNIFICANT CHANGE UP (ref 96–108)
CHLORIDE SERPL-SCNC: 100 MMOL/L — SIGNIFICANT CHANGE UP (ref 98–107)
CHLORIDE SERPL-SCNC: 101 MMOL/L — SIGNIFICANT CHANGE UP (ref 98–107)
CHLORIDE SERPL-SCNC: 101 MMOL/L — SIGNIFICANT CHANGE UP (ref 98–107)
CHLORIDE SERPL-SCNC: 94 MMOL/L — LOW (ref 98–107)
CHLORIDE SERPL-SCNC: 96 MMOL/L — LOW (ref 98–107)
CHLORIDE SERPL-SCNC: 97 MMOL/L — LOW (ref 98–107)
CHLORIDE SERPL-SCNC: 97 MMOL/L — LOW (ref 98–107)
CHLORIDE SERPL-SCNC: 98 MMOL/L — SIGNIFICANT CHANGE UP (ref 98–107)
CHLORIDE SERPL-SCNC: 99 MMOL/L — SIGNIFICANT CHANGE UP (ref 98–107)
CHOLEST SERPL-MCNC: 60 MG/DL — SIGNIFICANT CHANGE UP
CK MB BLD-MCNC: 0.2 % — SIGNIFICANT CHANGE UP (ref 0–2.5)
CK MB BLD-MCNC: 0.2 % — SIGNIFICANT CHANGE UP (ref 0–2.5)
CK MB CFR SERPL CALC: 7.2 NG/ML — HIGH
CK MB CFR SERPL CALC: 8 NG/ML — HIGH
CK SERPL-CCNC: 3341 U/L — HIGH (ref 30–200)
CK SERPL-CCNC: 4070 U/L — HIGH (ref 30–200)
CK SERPL-CCNC: 4265 U/L — HIGH (ref 30–200)
CO2 BLDV-SCNC: 23.9 MMOL/L — SIGNIFICANT CHANGE UP (ref 22–26)
CO2 BLDV-SCNC: 23.9 MMOL/L — SIGNIFICANT CHANGE UP (ref 22–26)
CO2 BLDV-SCNC: 24.9 MMOL/L — SIGNIFICANT CHANGE UP (ref 22–26)
CO2 SERPL-SCNC: 14 MMOL/L — LOW (ref 22–31)
CO2 SERPL-SCNC: 15 MMOL/L — LOW (ref 22–31)
CO2 SERPL-SCNC: 16 MMOL/L — LOW (ref 22–31)
CO2 SERPL-SCNC: 16 MMOL/L — LOW (ref 22–31)
CO2 SERPL-SCNC: 19 MMOL/L — LOW (ref 22–31)
CO2 SERPL-SCNC: 19 MMOL/L — LOW (ref 22–31)
CO2 SERPL-SCNC: 20 MMOL/L — LOW (ref 22–31)
CO2 SERPL-SCNC: 21 MMOL/L — LOW (ref 22–31)
CO2 SERPL-SCNC: 22 MMOL/L — SIGNIFICANT CHANGE UP (ref 22–31)
CO2 SERPL-SCNC: 23 MMOL/L — SIGNIFICANT CHANGE UP (ref 22–31)
COLOR SPEC: YELLOW — SIGNIFICANT CHANGE UP
CORTIS AM PEAK SERPL-MCNC: 63.4 UG/DL — HIGH (ref 6–18.4)
CREAT ?TM UR-MCNC: 189 MG/DL — SIGNIFICANT CHANGE UP
CREAT ?TM UR-MCNC: 189 MG/DL — SIGNIFICANT CHANGE UP
CREAT SERPL-MCNC: 2.86 MG/DL — HIGH (ref 0.5–1.3)
CREAT SERPL-MCNC: 3.38 MG/DL — HIGH (ref 0.5–1.3)
CREAT SERPL-MCNC: 3.44 MG/DL — HIGH (ref 0.5–1.3)
CREAT SERPL-MCNC: 3.68 MG/DL — HIGH (ref 0.5–1.3)
CREAT SERPL-MCNC: 3.84 MG/DL — HIGH (ref 0.5–1.3)
CREAT SERPL-MCNC: 3.87 MG/DL — HIGH (ref 0.5–1.3)
CREAT SERPL-MCNC: 4.23 MG/DL — HIGH (ref 0.5–1.3)
CREAT SERPL-MCNC: 4.33 MG/DL — HIGH (ref 0.5–1.3)
CREAT SERPL-MCNC: 4.34 MG/DL — HIGH (ref 0.5–1.3)
CREAT SERPL-MCNC: 4.34 MG/DL — HIGH (ref 0.5–1.3)
CREAT SERPL-MCNC: 4.37 MG/DL — HIGH (ref 0.5–1.3)
CREAT SERPL-MCNC: 4.59 MG/DL — HIGH (ref 0.5–1.3)
CREAT SERPL-MCNC: 5.15 MG/DL — HIGH (ref 0.5–1.3)
CRP SERPL-MCNC: 324.9 MG/L — HIGH
CULTURE RESULTS: NO GROWTH — SIGNIFICANT CHANGE UP
CULTURE RESULTS: SIGNIFICANT CHANGE UP
CULTURE RESULTS: SIGNIFICANT CHANGE UP
D DIMER BLD IA.RAPID-MCNC: 5717 NG/ML DDU — SIGNIFICANT CHANGE UP
D DIMER BLD IA.RAPID-MCNC: 5807 NG/ML DDU — SIGNIFICANT CHANGE UP
DACRYOCYTES BLD QL SMEAR: SLIGHT — SIGNIFICANT CHANGE UP
DIFF PNL FLD: NEGATIVE — SIGNIFICANT CHANGE UP
EGFR: 11 ML/MIN/1.73M2 — LOW
EGFR: 12 ML/MIN/1.73M2 — LOW
EGFR: 13 ML/MIN/1.73M2 — LOW
EGFR: 15 ML/MIN/1.73M2 — LOW
EGFR: 15 ML/MIN/1.73M2 — LOW
EGFR: 16 ML/MIN/1.73M2 — LOW
EGFR: 17 ML/MIN/1.73M2 — LOW
EGFR: 17 ML/MIN/1.73M2 — LOW
EGFR: 21 ML/MIN/1.73M2 — LOW
EOSINOPHIL # BLD AUTO: 0 K/UL — SIGNIFICANT CHANGE UP (ref 0–0.5)
EOSINOPHIL # BLD AUTO: 0.01 K/UL — SIGNIFICANT CHANGE UP (ref 0–0.5)
EOSINOPHIL # BLD AUTO: 0.02 K/UL — SIGNIFICANT CHANGE UP (ref 0–0.5)
EOSINOPHIL # BLD AUTO: 0.03 K/UL — SIGNIFICANT CHANGE UP (ref 0–0.5)
EOSINOPHIL NFR BLD AUTO: 0 % — SIGNIFICANT CHANGE UP (ref 0–6)
EOSINOPHIL NFR BLD AUTO: 0.1 % — SIGNIFICANT CHANGE UP (ref 0–6)
EOSINOPHIL NFR BLD AUTO: 0.2 % — SIGNIFICANT CHANGE UP (ref 0–6)
EOSINOPHIL NFR BLD AUTO: 0.4 % — SIGNIFICANT CHANGE UP (ref 0–6)
EPI CELLS # UR: 2 /HPF — SIGNIFICANT CHANGE UP (ref 0–5)
ESTIMATED AVERAGE GLUCOSE: 134 — SIGNIFICANT CHANGE UP
FERRITIN SERPL-MCNC: 612 NG/ML — HIGH (ref 30–400)
FLUAV AG NPH QL: SIGNIFICANT CHANGE UP
FLUBV AG NPH QL: SIGNIFICANT CHANGE UP
GAS PNL BLDV: 133 MMOL/L — LOW (ref 136–145)
GAS PNL BLDV: 133 MMOL/L — LOW (ref 136–145)
GAS PNL BLDV: 134 MMOL/L — LOW (ref 136–145)
GIANT PLATELETS BLD QL SMEAR: PRESENT — SIGNIFICANT CHANGE UP
GIANT PLATELETS BLD QL SMEAR: PRESENT — SIGNIFICANT CHANGE UP
GLUCOSE BLDC GLUCOMTR-MCNC: 100 MG/DL — HIGH (ref 70–99)
GLUCOSE BLDC GLUCOMTR-MCNC: 117 MG/DL — HIGH (ref 70–99)
GLUCOSE BLDC GLUCOMTR-MCNC: 130 MG/DL — HIGH (ref 70–99)
GLUCOSE BLDC GLUCOMTR-MCNC: 133 MG/DL — HIGH (ref 70–99)
GLUCOSE BLDC GLUCOMTR-MCNC: 139 MG/DL — HIGH (ref 70–99)
GLUCOSE BLDC GLUCOMTR-MCNC: 140 MG/DL — HIGH (ref 70–99)
GLUCOSE BLDC GLUCOMTR-MCNC: 145 MG/DL — HIGH (ref 70–99)
GLUCOSE BLDC GLUCOMTR-MCNC: 153 MG/DL — HIGH (ref 70–99)
GLUCOSE BLDC GLUCOMTR-MCNC: 154 MG/DL — HIGH (ref 70–99)
GLUCOSE BLDC GLUCOMTR-MCNC: 156 MG/DL — HIGH (ref 70–99)
GLUCOSE BLDC GLUCOMTR-MCNC: 157 MG/DL — HIGH (ref 70–99)
GLUCOSE BLDC GLUCOMTR-MCNC: 159 MG/DL — HIGH (ref 70–99)
GLUCOSE BLDC GLUCOMTR-MCNC: 170 MG/DL — HIGH (ref 70–99)
GLUCOSE BLDC GLUCOMTR-MCNC: 171 MG/DL — HIGH (ref 70–99)
GLUCOSE BLDC GLUCOMTR-MCNC: 178 MG/DL — HIGH (ref 70–99)
GLUCOSE BLDC GLUCOMTR-MCNC: 179 MG/DL — HIGH (ref 70–99)
GLUCOSE BLDC GLUCOMTR-MCNC: 181 MG/DL — HIGH (ref 70–99)
GLUCOSE BLDC GLUCOMTR-MCNC: 194 MG/DL — HIGH (ref 70–99)
GLUCOSE BLDC GLUCOMTR-MCNC: 202 MG/DL — HIGH (ref 70–99)
GLUCOSE BLDC GLUCOMTR-MCNC: 203 MG/DL — HIGH (ref 70–99)
GLUCOSE BLDC GLUCOMTR-MCNC: 203 MG/DL — HIGH (ref 70–99)
GLUCOSE BLDC GLUCOMTR-MCNC: 214 MG/DL — HIGH (ref 70–99)
GLUCOSE BLDC GLUCOMTR-MCNC: 214 MG/DL — HIGH (ref 70–99)
GLUCOSE BLDC GLUCOMTR-MCNC: 223 MG/DL — HIGH (ref 70–99)
GLUCOSE BLDC GLUCOMTR-MCNC: 223 MG/DL — HIGH (ref 70–99)
GLUCOSE BLDC GLUCOMTR-MCNC: 235 MG/DL — HIGH (ref 70–99)
GLUCOSE BLDC GLUCOMTR-MCNC: 248 MG/DL — HIGH (ref 70–99)
GLUCOSE BLDC GLUCOMTR-MCNC: 25 MG/DL — CRITICAL LOW (ref 70–99)
GLUCOSE BLDC GLUCOMTR-MCNC: 268 MG/DL — HIGH (ref 70–99)
GLUCOSE BLDC GLUCOMTR-MCNC: 271 MG/DL — HIGH (ref 70–99)
GLUCOSE BLDC GLUCOMTR-MCNC: 276 MG/DL — HIGH (ref 70–99)
GLUCOSE BLDC GLUCOMTR-MCNC: 292 MG/DL — HIGH (ref 70–99)
GLUCOSE BLDC GLUCOMTR-MCNC: 307 MG/DL — HIGH (ref 70–99)
GLUCOSE BLDC GLUCOMTR-MCNC: 308 MG/DL — HIGH (ref 70–99)
GLUCOSE BLDC GLUCOMTR-MCNC: 351 MG/DL — HIGH (ref 70–99)
GLUCOSE BLDC GLUCOMTR-MCNC: 66 MG/DL — LOW (ref 70–99)
GLUCOSE BLDC GLUCOMTR-MCNC: 80 MG/DL — SIGNIFICANT CHANGE UP (ref 70–99)
GLUCOSE BLDC GLUCOMTR-MCNC: 87 MG/DL — SIGNIFICANT CHANGE UP (ref 70–99)
GLUCOSE BLDC GLUCOMTR-MCNC: 99 MG/DL — SIGNIFICANT CHANGE UP (ref 70–99)
GLUCOSE BLDC GLUCOMTR-MCNC: <25 MG/DL — CRITICAL LOW (ref 70–99)
GLUCOSE BLDV-MCNC: 205 MG/DL — HIGH (ref 70–99)
GLUCOSE BLDV-MCNC: 224 MG/DL — HIGH (ref 70–99)
GLUCOSE BLDV-MCNC: 226 MG/DL — HIGH (ref 70–99)
GLUCOSE SERPL-MCNC: 160 MG/DL — HIGH (ref 70–99)
GLUCOSE SERPL-MCNC: 162 MG/DL — HIGH (ref 70–99)
GLUCOSE SERPL-MCNC: 189 MG/DL — HIGH (ref 70–99)
GLUCOSE SERPL-MCNC: 200 MG/DL — HIGH (ref 70–99)
GLUCOSE SERPL-MCNC: 206 MG/DL — HIGH (ref 70–99)
GLUCOSE SERPL-MCNC: 210 MG/DL — HIGH (ref 70–99)
GLUCOSE SERPL-MCNC: 226 MG/DL — HIGH (ref 70–99)
GLUCOSE SERPL-MCNC: 229 MG/DL — HIGH (ref 70–99)
GLUCOSE SERPL-MCNC: 237 MG/DL — HIGH (ref 70–99)
GLUCOSE SERPL-MCNC: 265 MG/DL — HIGH (ref 70–99)
GLUCOSE SERPL-MCNC: 311 MG/DL — HIGH (ref 70–99)
GLUCOSE SERPL-MCNC: 313 MG/DL — HIGH (ref 70–99)
GLUCOSE SERPL-MCNC: 364 MG/DL — HIGH (ref 70–99)
GLUCOSE UR QL: NEGATIVE — SIGNIFICANT CHANGE UP
HCO3 BLDV-SCNC: 21 MMOL/L — LOW (ref 22–29)
HCO3 BLDV-SCNC: 22 MMOL/L — SIGNIFICANT CHANGE UP (ref 22–29)
HCO3 BLDV-SCNC: 23 MMOL/L — SIGNIFICANT CHANGE UP (ref 22–29)
HCT VFR BLD CALC: 22.9 % — LOW (ref 39–50)
HCT VFR BLD CALC: 23.7 % — LOW (ref 39–50)
HCT VFR BLD CALC: 26.2 % — LOW (ref 39–50)
HCT VFR BLD CALC: 28.5 % — LOW (ref 39–50)
HCT VFR BLD CALC: 29.6 % — LOW (ref 39–50)
HCT VFR BLD CALC: 30.4 % — LOW (ref 39–50)
HCT VFR BLD CALC: 30.6 % — LOW (ref 39–50)
HCT VFR BLD CALC: 30.7 % — LOW (ref 39–50)
HCT VFR BLD CALC: 31.4 % — LOW (ref 39–50)
HCT VFR BLD CALC: 32.1 % — LOW (ref 39–50)
HCT VFR BLD CALC: 34.4 % — LOW (ref 39–50)
HCT VFR BLD CALC: 38.2 % — LOW (ref 39–50)
HCT VFR BLDA CALC: 32 % — LOW (ref 39–51)
HCT VFR BLDA CALC: 32 % — LOW (ref 39–51)
HCT VFR BLDA CALC: 37 % — LOW (ref 39–51)
HCV AB S/CO SERPL IA: 0.11 S/CO — SIGNIFICANT CHANGE UP (ref 0–0.99)
HCV AB SERPL-IMP: SIGNIFICANT CHANGE UP
HDLC SERPL-MCNC: 36 MG/DL — LOW
HEPARIN-PF4 AB RESULT: <0.6 U/ML — SIGNIFICANT CHANGE UP (ref 0–0.9)
HGB BLD CALC-MCNC: 10.5 G/DL — LOW (ref 13–17)
HGB BLD CALC-MCNC: 10.5 G/DL — LOW (ref 13–17)
HGB BLD CALC-MCNC: 12.2 G/DL — LOW (ref 13–17)
HGB BLD-MCNC: 10.3 G/DL — LOW (ref 13–17)
HGB BLD-MCNC: 10.5 G/DL — LOW (ref 13–17)
HGB BLD-MCNC: 11.8 G/DL — LOW (ref 13–17)
HGB BLD-MCNC: 7.4 G/DL — LOW (ref 13–17)
HGB BLD-MCNC: 7.5 G/DL — LOW (ref 13–17)
HGB BLD-MCNC: 8.4 G/DL — LOW (ref 13–17)
HGB BLD-MCNC: 9.4 G/DL — LOW (ref 13–17)
HGB BLD-MCNC: 9.5 G/DL — LOW (ref 13–17)
HGB BLD-MCNC: 9.5 G/DL — LOW (ref 13–17)
HGB BLD-MCNC: 9.7 G/DL — LOW (ref 13–17)
HGB BLD-MCNC: 9.8 G/DL — LOW (ref 13–17)
HGB BLD-MCNC: 9.8 G/DL — LOW (ref 13–17)
HYALINE CASTS # UR AUTO: 6 /LPF — SIGNIFICANT CHANGE UP (ref 0–7)
IANC: 10.1 K/UL — HIGH (ref 1.8–7.4)
IANC: 11.07 K/UL — HIGH (ref 1.8–7.4)
IANC: 11.3 K/UL — HIGH (ref 1.8–7.4)
IANC: 12.71 K/UL — HIGH (ref 1.8–7.4)
IANC: 12.95 K/UL — HIGH (ref 1.8–7.4)
IANC: 13.66 K/UL — HIGH (ref 1.8–7.4)
IANC: 4.81 K/UL — SIGNIFICANT CHANGE UP (ref 1.8–7.4)
IANC: 5.71 K/UL — SIGNIFICANT CHANGE UP (ref 1.8–7.4)
IANC: 6.59 K/UL — SIGNIFICANT CHANGE UP (ref 1.8–7.4)
IANC: 6.95 K/UL — SIGNIFICANT CHANGE UP (ref 1.8–7.4)
IANC: 7.22 K/UL — SIGNIFICANT CHANGE UP (ref 1.8–7.4)
IANC: 7.47 K/UL — HIGH (ref 1.8–7.4)
IMM GRANULOCYTES NFR BLD AUTO: 0.3 % — SIGNIFICANT CHANGE UP (ref 0–0.9)
IMM GRANULOCYTES NFR BLD AUTO: 0.3 % — SIGNIFICANT CHANGE UP (ref 0–0.9)
IMM GRANULOCYTES NFR BLD AUTO: 0.4 % — SIGNIFICANT CHANGE UP (ref 0–0.9)
IMM GRANULOCYTES NFR BLD AUTO: 0.5 % — SIGNIFICANT CHANGE UP (ref 0–0.9)
IMM GRANULOCYTES NFR BLD AUTO: 0.7 % — SIGNIFICANT CHANGE UP (ref 0–0.9)
IMM GRANULOCYTES NFR BLD AUTO: 0.8 % — SIGNIFICANT CHANGE UP (ref 0–0.9)
IMM GRANULOCYTES NFR BLD AUTO: 1 % — HIGH (ref 0–0.9)
IMM GRANULOCYTES NFR BLD AUTO: 2 % — HIGH (ref 0–0.9)
INR BLD: 0.95 RATIO — SIGNIFICANT CHANGE UP (ref 0.88–1.16)
INR BLD: 1.15 RATIO — SIGNIFICANT CHANGE UP (ref 0.88–1.16)
INR BLD: 1.26 RATIO — HIGH (ref 0.88–1.16)
INR BLD: 1.26 RATIO — HIGH (ref 0.88–1.16)
INR BLD: 1.41 RATIO — HIGH (ref 0.88–1.16)
INR BLD: <0.9 RATIO — SIGNIFICANT CHANGE UP (ref 0.88–1.16)
INR BLD: <0.9 RATIO — SIGNIFICANT CHANGE UP (ref 0.88–1.16)
KETONES UR-MCNC: NEGATIVE — SIGNIFICANT CHANGE UP
LACTATE BLDV-MCNC: 3 MMOL/L — HIGH (ref 0.5–2)
LACTATE BLDV-MCNC: 3.9 MMOL/L — HIGH (ref 0.5–2)
LACTATE BLDV-MCNC: 4.7 MMOL/L — CRITICAL HIGH (ref 0.5–2)
LACTATE SERPL-SCNC: 3.9 MMOL/L — HIGH (ref 0.5–2)
LACTATE SERPL-SCNC: 4 MMOL/L — CRITICAL HIGH (ref 0.5–2)
LACTATE SERPL-SCNC: 4.6 MMOL/L — CRITICAL HIGH (ref 0.5–2)
LACTATE SERPL-SCNC: 6.4 MMOL/L — CRITICAL HIGH (ref 0.5–2)
LDH SERPL L TO P-CCNC: 376 U/L — HIGH (ref 135–225)
LEGIONELLA AG UR QL: NEGATIVE — SIGNIFICANT CHANGE UP
LEUKOCYTE ESTERASE UR-ACNC: NEGATIVE — SIGNIFICANT CHANGE UP
LIDOCAIN IGE QN: 11 U/L — SIGNIFICANT CHANGE UP (ref 7–60)
LIPID PNL WITH DIRECT LDL SERPL: 4 MG/DL — SIGNIFICANT CHANGE UP
LYMPHOCYTES # BLD AUTO: 0.11 K/UL — LOW (ref 1–3.3)
LYMPHOCYTES # BLD AUTO: 0.32 K/UL — LOW (ref 1–3.3)
LYMPHOCYTES # BLD AUTO: 0.33 K/UL — LOW (ref 1–3.3)
LYMPHOCYTES # BLD AUTO: 0.35 K/UL — LOW (ref 1–3.3)
LYMPHOCYTES # BLD AUTO: 0.35 K/UL — LOW (ref 1–3.3)
LYMPHOCYTES # BLD AUTO: 0.37 K/UL — LOW (ref 1–3.3)
LYMPHOCYTES # BLD AUTO: 0.39 K/UL — LOW (ref 1–3.3)
LYMPHOCYTES # BLD AUTO: 0.53 K/UL — LOW (ref 1–3.3)
LYMPHOCYTES # BLD AUTO: 0.58 K/UL — LOW (ref 1–3.3)
LYMPHOCYTES # BLD AUTO: 0.58 K/UL — LOW (ref 1–3.3)
LYMPHOCYTES # BLD AUTO: 0.77 K/UL — LOW (ref 1–3.3)
LYMPHOCYTES # BLD AUTO: 0.77 K/UL — LOW (ref 1–3.3)
LYMPHOCYTES # BLD AUTO: 0.9 % — LOW (ref 13–44)
LYMPHOCYTES # BLD AUTO: 2.5 % — LOW (ref 13–44)
LYMPHOCYTES # BLD AUTO: 3.4 % — LOW (ref 13–44)
LYMPHOCYTES # BLD AUTO: 3.4 % — LOW (ref 13–44)
LYMPHOCYTES # BLD AUTO: 3.7 % — LOW (ref 13–44)
LYMPHOCYTES # BLD AUTO: 4.3 % — LOW (ref 13–44)
LYMPHOCYTES # BLD AUTO: 4.3 % — LOW (ref 13–44)
LYMPHOCYTES # BLD AUTO: 5.1 % — LOW (ref 13–44)
LYMPHOCYTES # BLD AUTO: 5.4 % — LOW (ref 13–44)
LYMPHOCYTES # BLD AUTO: 6 % — LOW (ref 13–44)
LYMPHOCYTES # BLD AUTO: 6.3 % — LOW (ref 13–44)
LYMPHOCYTES # BLD AUTO: 8.7 % — LOW (ref 13–44)
MACROCYTES BLD QL: SLIGHT — SIGNIFICANT CHANGE UP
MAGNESIUM SERPL-MCNC: 1.6 MG/DL — SIGNIFICANT CHANGE UP (ref 1.6–2.6)
MAGNESIUM SERPL-MCNC: 1.7 MG/DL — SIGNIFICANT CHANGE UP (ref 1.6–2.6)
MAGNESIUM SERPL-MCNC: 1.9 MG/DL — SIGNIFICANT CHANGE UP (ref 1.6–2.6)
MAGNESIUM SERPL-MCNC: 2 MG/DL — SIGNIFICANT CHANGE UP (ref 1.6–2.6)
MAGNESIUM SERPL-MCNC: 2.1 MG/DL — SIGNIFICANT CHANGE UP (ref 1.6–2.6)
MAGNESIUM SERPL-MCNC: 2.1 MG/DL — SIGNIFICANT CHANGE UP (ref 1.6–2.6)
MAGNESIUM SERPL-MCNC: 2.2 MG/DL — SIGNIFICANT CHANGE UP (ref 1.6–2.6)
MAGNESIUM SERPL-MCNC: 2.3 MG/DL — SIGNIFICANT CHANGE UP (ref 1.6–2.6)
MANUAL SMEAR VERIFICATION: SIGNIFICANT CHANGE UP
MANUAL SMEAR VERIFICATION: SIGNIFICANT CHANGE UP
MCHC RBC-ENTMCNC: 24.3 PG — LOW (ref 27–34)
MCHC RBC-ENTMCNC: 24.7 PG — LOW (ref 27–34)
MCHC RBC-ENTMCNC: 24.8 PG — LOW (ref 27–34)
MCHC RBC-ENTMCNC: 24.8 PG — LOW (ref 27–34)
MCHC RBC-ENTMCNC: 24.9 PG — LOW (ref 27–34)
MCHC RBC-ENTMCNC: 25 PG — LOW (ref 27–34)
MCHC RBC-ENTMCNC: 25.1 PG — LOW (ref 27–34)
MCHC RBC-ENTMCNC: 25.2 PG — LOW (ref 27–34)
MCHC RBC-ENTMCNC: 25.3 PG — LOW (ref 27–34)
MCHC RBC-ENTMCNC: 25.3 PG — LOW (ref 27–34)
MCHC RBC-ENTMCNC: 30.5 GM/DL — LOW (ref 32–36)
MCHC RBC-ENTMCNC: 30.9 GM/DL — LOW (ref 32–36)
MCHC RBC-ENTMCNC: 30.9 GM/DL — LOW (ref 32–36)
MCHC RBC-ENTMCNC: 31.2 GM/DL — LOW (ref 32–36)
MCHC RBC-ENTMCNC: 31.6 GM/DL — LOW (ref 32–36)
MCHC RBC-ENTMCNC: 31.7 GM/DL — LOW (ref 32–36)
MCHC RBC-ENTMCNC: 32.1 GM/DL — SIGNIFICANT CHANGE UP (ref 32–36)
MCHC RBC-ENTMCNC: 32.2 GM/DL — SIGNIFICANT CHANGE UP (ref 32–36)
MCHC RBC-ENTMCNC: 32.3 GM/DL — SIGNIFICANT CHANGE UP (ref 32–36)
MCHC RBC-ENTMCNC: 33 GM/DL — SIGNIFICANT CHANGE UP (ref 32–36)
MCV RBC AUTO: 75.1 FL — LOW (ref 80–100)
MCV RBC AUTO: 76.4 FL — LOW (ref 80–100)
MCV RBC AUTO: 76.8 FL — LOW (ref 80–100)
MCV RBC AUTO: 77.3 FL — LOW (ref 80–100)
MCV RBC AUTO: 78 FL — LOW (ref 80–100)
MCV RBC AUTO: 78 FL — LOW (ref 80–100)
MCV RBC AUTO: 78.1 FL — LOW (ref 80–100)
MCV RBC AUTO: 78.9 FL — LOW (ref 80–100)
MCV RBC AUTO: 80.4 FL — SIGNIFICANT CHANGE UP (ref 80–100)
MCV RBC AUTO: 80.9 FL — SIGNIFICANT CHANGE UP (ref 80–100)
MCV RBC AUTO: 81.1 FL — SIGNIFICANT CHANGE UP (ref 80–100)
MCV RBC AUTO: 81.3 FL — SIGNIFICANT CHANGE UP (ref 80–100)
METAMYELOCYTES # FLD: 7 % — HIGH (ref 0–1)
MICROCYTES BLD QL: SLIGHT — SIGNIFICANT CHANGE UP
MONOCYTES # BLD AUTO: 0.36 K/UL — SIGNIFICANT CHANGE UP (ref 0–0.9)
MONOCYTES # BLD AUTO: 0.51 K/UL — SIGNIFICANT CHANGE UP (ref 0–0.9)
MONOCYTES # BLD AUTO: 0.59 K/UL — SIGNIFICANT CHANGE UP (ref 0–0.9)
MONOCYTES # BLD AUTO: 0.64 K/UL — SIGNIFICANT CHANGE UP (ref 0–0.9)
MONOCYTES # BLD AUTO: 0.66 K/UL — SIGNIFICANT CHANGE UP (ref 0–0.9)
MONOCYTES # BLD AUTO: 0.81 K/UL — SIGNIFICANT CHANGE UP (ref 0–0.9)
MONOCYTES # BLD AUTO: 0.93 K/UL — HIGH (ref 0–0.9)
MONOCYTES # BLD AUTO: 1.14 K/UL — HIGH (ref 0–0.9)
MONOCYTES # BLD AUTO: 1.64 K/UL — HIGH (ref 0–0.9)
MONOCYTES # BLD AUTO: 2.01 K/UL — HIGH (ref 0–0.9)
MONOCYTES # BLD AUTO: 2.1 K/UL — HIGH (ref 0–0.9)
MONOCYTES # BLD AUTO: 2.12 K/UL — HIGH (ref 0–0.9)
MONOCYTES NFR BLD AUTO: 10.8 % — SIGNIFICANT CHANGE UP (ref 2–14)
MONOCYTES NFR BLD AUTO: 13.6 % — SIGNIFICANT CHANGE UP (ref 2–14)
MONOCYTES NFR BLD AUTO: 16.4 % — HIGH (ref 2–14)
MONOCYTES NFR BLD AUTO: 17.2 % — HIGH (ref 2–14)
MONOCYTES NFR BLD AUTO: 22 % — HIGH (ref 2–14)
MONOCYTES NFR BLD AUTO: 3.6 % — SIGNIFICANT CHANGE UP (ref 2–14)
MONOCYTES NFR BLD AUTO: 5.8 % — SIGNIFICANT CHANGE UP (ref 2–14)
MONOCYTES NFR BLD AUTO: 6.3 % — SIGNIFICANT CHANGE UP (ref 2–14)
MONOCYTES NFR BLD AUTO: 6.5 % — SIGNIFICANT CHANGE UP (ref 2–14)
MONOCYTES NFR BLD AUTO: 7.4 % — SIGNIFICANT CHANGE UP (ref 2–14)
MONOCYTES NFR BLD AUTO: 7.7 % — SIGNIFICANT CHANGE UP (ref 2–14)
MONOCYTES NFR BLD AUTO: 9.6 % — SIGNIFICANT CHANGE UP (ref 2–14)
MRSA PCR RESULT.: SIGNIFICANT CHANGE UP
MYELOCYTES NFR BLD: 0.9 % — HIGH (ref 0–0)
MYELOCYTES NFR BLD: 1.7 % — HIGH (ref 0–0)
NEUTROPHILS # BLD AUTO: 10.1 K/UL — HIGH (ref 1.8–7.4)
NEUTROPHILS # BLD AUTO: 11.07 K/UL — HIGH (ref 1.8–7.4)
NEUTROPHILS # BLD AUTO: 12.71 K/UL — HIGH (ref 1.8–7.4)
NEUTROPHILS # BLD AUTO: 12.95 K/UL — HIGH (ref 1.8–7.4)
NEUTROPHILS # BLD AUTO: 13.66 K/UL — HIGH (ref 1.8–7.4)
NEUTROPHILS # BLD AUTO: 4.76 K/UL — SIGNIFICANT CHANGE UP (ref 1.8–7.4)
NEUTROPHILS # BLD AUTO: 4.81 K/UL — SIGNIFICANT CHANGE UP (ref 1.8–7.4)
NEUTROPHILS # BLD AUTO: 6.59 K/UL — SIGNIFICANT CHANGE UP (ref 1.8–7.4)
NEUTROPHILS # BLD AUTO: 6.95 K/UL — SIGNIFICANT CHANGE UP (ref 1.8–7.4)
NEUTROPHILS # BLD AUTO: 7.22 K/UL — SIGNIFICANT CHANGE UP (ref 1.8–7.4)
NEUTROPHILS # BLD AUTO: 7.47 K/UL — HIGH (ref 1.8–7.4)
NEUTROPHILS # BLD AUTO: 9.7 K/UL — HIGH (ref 1.8–7.4)
NEUTROPHILS NFR BLD AUTO: 45.2 % — SIGNIFICANT CHANGE UP (ref 43–77)
NEUTROPHILS NFR BLD AUTO: 71.8 % — SIGNIFICANT CHANGE UP (ref 43–77)
NEUTROPHILS NFR BLD AUTO: 72.8 % — SIGNIFICANT CHANGE UP (ref 43–77)
NEUTROPHILS NFR BLD AUTO: 78.3 % — HIGH (ref 43–77)
NEUTROPHILS NFR BLD AUTO: 82.9 % — HIGH (ref 43–77)
NEUTROPHILS NFR BLD AUTO: 84.3 % — HIGH (ref 43–77)
NEUTROPHILS NFR BLD AUTO: 85.7 % — HIGH (ref 43–77)
NEUTROPHILS NFR BLD AUTO: 86.1 % — HIGH (ref 43–77)
NEUTROPHILS NFR BLD AUTO: 86.4 % — HIGH (ref 43–77)
NEUTROPHILS NFR BLD AUTO: 88.1 % — HIGH (ref 43–77)
NEUTROPHILS NFR BLD AUTO: 88.4 % — HIGH (ref 43–77)
NEUTROPHILS NFR BLD AUTO: 90 % — HIGH (ref 43–77)
NEUTS BAND # BLD: 26.1 % — CRITICAL HIGH (ref 0–6)
NEUTS BAND # BLD: 7.3 % — HIGH (ref 0–6)
NITRITE UR-MCNC: NEGATIVE — SIGNIFICANT CHANGE UP
NON HDL CHOLESTEROL: 24 MG/DL — SIGNIFICANT CHANGE UP
NRBC # BLD: 0 /100 WBCS — SIGNIFICANT CHANGE UP (ref 0–0)
NRBC # FLD: 0 K/UL — SIGNIFICANT CHANGE UP (ref 0–0)
NRBC # FLD: 0.03 K/UL — HIGH (ref 0–0)
OVALOCYTES BLD QL SMEAR: SLIGHT — SIGNIFICANT CHANGE UP
OVALOCYTES BLD QL SMEAR: SLIGHT — SIGNIFICANT CHANGE UP
PCO2 BLDV: 50 MMHG — SIGNIFICANT CHANGE UP (ref 42–55)
PCO2 BLDV: 52 MMHG — SIGNIFICANT CHANGE UP (ref 42–55)
PCO2 BLDV: 81 MMHG — HIGH (ref 42–55)
PF4 HEPARIN CMPLX AB SER-ACNC: NEGATIVE — SIGNIFICANT CHANGE UP
PH BLDV: 7.03 — LOW (ref 7.32–7.43)
PH BLDV: 7.26 — LOW (ref 7.32–7.43)
PH BLDV: 7.26 — LOW (ref 7.32–7.43)
PH UR: 5.5 — SIGNIFICANT CHANGE UP (ref 5–8)
PHOSPHATE SERPL-MCNC: 4.9 MG/DL — HIGH (ref 2.5–4.5)
PHOSPHATE SERPL-MCNC: 5.4 MG/DL — HIGH (ref 2.5–4.5)
PHOSPHATE SERPL-MCNC: 5.8 MG/DL — HIGH (ref 2.5–4.5)
PHOSPHATE SERPL-MCNC: 6.6 MG/DL — HIGH (ref 2.5–4.5)
PHOSPHATE SERPL-MCNC: 7.1 MG/DL — HIGH (ref 2.5–4.5)
PHOSPHATE SERPL-MCNC: 7.4 MG/DL — HIGH (ref 2.5–4.5)
PHOSPHATE SERPL-MCNC: 8 MG/DL — HIGH (ref 2.5–4.5)
PHOSPHATE SERPL-MCNC: 8.2 MG/DL — HIGH (ref 2.5–4.5)
PHOSPHATE SERPL-MCNC: 9.1 MG/DL — HIGH (ref 2.5–4.5)
PHOSPHATE SERPL-MCNC: 9.9 MG/DL — HIGH (ref 2.5–4.5)
PLAT MORPH BLD: NORMAL — SIGNIFICANT CHANGE UP
PLAT MORPH BLD: NORMAL — SIGNIFICANT CHANGE UP
PLATELET # BLD AUTO: 110 K/UL — LOW (ref 150–400)
PLATELET # BLD AUTO: 16 K/UL — CRITICAL LOW (ref 150–400)
PLATELET # BLD AUTO: 18 K/UL — CRITICAL LOW (ref 150–400)
PLATELET # BLD AUTO: 18 K/UL — CRITICAL LOW (ref 150–400)
PLATELET # BLD AUTO: 22 K/UL — LOW (ref 150–400)
PLATELET # BLD AUTO: 24 K/UL — LOW (ref 150–400)
PLATELET # BLD AUTO: 26 K/UL — LOW (ref 150–400)
PLATELET # BLD AUTO: 33 K/UL — LOW (ref 150–400)
PLATELET # BLD AUTO: 41 K/UL — LOW (ref 150–400)
PLATELET # BLD AUTO: 73 K/UL — LOW (ref 150–400)
PLATELET COUNT - ESTIMATE: ABNORMAL
PLATELET COUNT - ESTIMATE: ABNORMAL
PO2 BLDV: 20 MMHG — SIGNIFICANT CHANGE UP
PO2 BLDV: 24 MMHG — SIGNIFICANT CHANGE UP
PO2 BLDV: 42 MMHG — SIGNIFICANT CHANGE UP
POIKILOCYTOSIS BLD QL AUTO: SLIGHT — SIGNIFICANT CHANGE UP
POIKILOCYTOSIS BLD QL AUTO: SLIGHT — SIGNIFICANT CHANGE UP
POLYCHROMASIA BLD QL SMEAR: SLIGHT — SIGNIFICANT CHANGE UP
POTASSIUM BLDV-SCNC: 3.4 MMOL/L — LOW (ref 3.5–5.1)
POTASSIUM BLDV-SCNC: 3.9 MMOL/L — SIGNIFICANT CHANGE UP (ref 3.5–5.1)
POTASSIUM BLDV-SCNC: 5.5 MMOL/L — HIGH (ref 3.5–5.1)
POTASSIUM SERPL-MCNC: 2.9 MMOL/L — CRITICAL LOW (ref 3.5–5.3)
POTASSIUM SERPL-MCNC: 3 MMOL/L — LOW (ref 3.5–5.3)
POTASSIUM SERPL-MCNC: 3.1 MMOL/L — LOW (ref 3.5–5.3)
POTASSIUM SERPL-MCNC: 3.1 MMOL/L — LOW (ref 3.5–5.3)
POTASSIUM SERPL-MCNC: 3.3 MMOL/L — LOW (ref 3.5–5.3)
POTASSIUM SERPL-MCNC: 3.5 MMOL/L — SIGNIFICANT CHANGE UP (ref 3.5–5.3)
POTASSIUM SERPL-MCNC: 3.5 MMOL/L — SIGNIFICANT CHANGE UP (ref 3.5–5.3)
POTASSIUM SERPL-MCNC: 3.9 MMOL/L — SIGNIFICANT CHANGE UP (ref 3.5–5.3)
POTASSIUM SERPL-MCNC: 4 MMOL/L — SIGNIFICANT CHANGE UP (ref 3.5–5.3)
POTASSIUM SERPL-MCNC: 4 MMOL/L — SIGNIFICANT CHANGE UP (ref 3.5–5.3)
POTASSIUM SERPL-MCNC: 4.3 MMOL/L — SIGNIFICANT CHANGE UP (ref 3.5–5.3)
POTASSIUM SERPL-MCNC: 4.3 MMOL/L — SIGNIFICANT CHANGE UP (ref 3.5–5.3)
POTASSIUM SERPL-MCNC: 4.6 MMOL/L — SIGNIFICANT CHANGE UP (ref 3.5–5.3)
POTASSIUM SERPL-SCNC: 2.9 MMOL/L — CRITICAL LOW (ref 3.5–5.3)
POTASSIUM SERPL-SCNC: 3 MMOL/L — LOW (ref 3.5–5.3)
POTASSIUM SERPL-SCNC: 3.1 MMOL/L — LOW (ref 3.5–5.3)
POTASSIUM SERPL-SCNC: 3.1 MMOL/L — LOW (ref 3.5–5.3)
POTASSIUM SERPL-SCNC: 3.3 MMOL/L — LOW (ref 3.5–5.3)
POTASSIUM SERPL-SCNC: 3.5 MMOL/L — SIGNIFICANT CHANGE UP (ref 3.5–5.3)
POTASSIUM SERPL-SCNC: 3.5 MMOL/L — SIGNIFICANT CHANGE UP (ref 3.5–5.3)
POTASSIUM SERPL-SCNC: 3.9 MMOL/L — SIGNIFICANT CHANGE UP (ref 3.5–5.3)
POTASSIUM SERPL-SCNC: 4 MMOL/L — SIGNIFICANT CHANGE UP (ref 3.5–5.3)
POTASSIUM SERPL-SCNC: 4 MMOL/L — SIGNIFICANT CHANGE UP (ref 3.5–5.3)
POTASSIUM SERPL-SCNC: 4.3 MMOL/L — SIGNIFICANT CHANGE UP (ref 3.5–5.3)
POTASSIUM SERPL-SCNC: 4.3 MMOL/L — SIGNIFICANT CHANGE UP (ref 3.5–5.3)
POTASSIUM SERPL-SCNC: 4.6 MMOL/L — SIGNIFICANT CHANGE UP (ref 3.5–5.3)
PROCALCITONIN SERPL-MCNC: >100 NG/ML — HIGH (ref 0.02–0.1)
PROCALCITONIN SERPL-MCNC: >100 NG/ML — HIGH (ref 0.02–0.1)
PROT ?TM UR-MCNC: 30 MG/DL — SIGNIFICANT CHANGE UP
PROT SERPL-MCNC: 5.2 G/DL — LOW (ref 6–8.3)
PROT SERPL-MCNC: 5.3 G/DL — LOW (ref 6–8.3)
PROT SERPL-MCNC: 5.3 G/DL — LOW (ref 6–8.3)
PROT SERPL-MCNC: 5.4 G/DL — LOW (ref 6–8.3)
PROT SERPL-MCNC: 5.6 G/DL — LOW (ref 6–8.3)
PROT SERPL-MCNC: 5.6 G/DL — LOW (ref 6–8.3)
PROT SERPL-MCNC: 5.7 G/DL — LOW (ref 6–8.3)
PROT SERPL-MCNC: 6.5 G/DL — SIGNIFICANT CHANGE UP (ref 6–8.3)
PROT SERPL-MCNC: 7 G/DL — SIGNIFICANT CHANGE UP (ref 6–8.3)
PROT SERPL-MCNC: 7 G/DL — SIGNIFICANT CHANGE UP (ref 6–8.3)
PROT UR-MCNC: ABNORMAL
PROT/CREAT UR-RTO: 0.2 RATIO — SIGNIFICANT CHANGE UP (ref 0–0.2)
PROTHROM AB SERPL-ACNC: 10.2 SEC — LOW (ref 10.5–13.4)
PROTHROM AB SERPL-ACNC: 10.3 SEC — LOW (ref 10.5–13.4)
PROTHROM AB SERPL-ACNC: 11 SEC — SIGNIFICANT CHANGE UP (ref 10.5–13.4)
PROTHROM AB SERPL-ACNC: 13.4 SEC — SIGNIFICANT CHANGE UP (ref 10.5–13.4)
PROTHROM AB SERPL-ACNC: 14.7 SEC — HIGH (ref 10.5–13.4)
PROTHROM AB SERPL-ACNC: 14.7 SEC — HIGH (ref 10.5–13.4)
PROTHROM AB SERPL-ACNC: 16.4 SEC — HIGH (ref 10.5–13.4)
RBC # BLD: 3.04 M/UL — LOW (ref 4.2–5.8)
RBC # BLD: 3.05 M/UL — LOW (ref 4.2–5.8)
RBC # BLD: 3.36 M/UL — LOW (ref 4.2–5.8)
RBC # BLD: 3.73 M/UL — LOW (ref 4.2–5.8)
RBC # BLD: 3.82 M/UL — LOW (ref 4.2–5.8)
RBC # BLD: 3.83 M/UL — LOW (ref 4.2–5.8)
RBC # BLD: 3.88 M/UL — LOW (ref 4.2–5.8)
RBC # BLD: 3.92 M/UL — LOW (ref 4.2–5.8)
RBC # BLD: 3.96 M/UL — LOW (ref 4.2–5.8)
RBC # BLD: 4.07 M/UL — LOW (ref 4.2–5.8)
RBC # BLD: 4.24 M/UL — SIGNIFICANT CHANGE UP (ref 4.2–5.8)
RBC # BLD: 4.7 M/UL — SIGNIFICANT CHANGE UP (ref 4.2–5.8)
RBC # FLD: 18.1 % — HIGH (ref 10.3–14.5)
RBC # FLD: 18.2 % — HIGH (ref 10.3–14.5)
RBC # FLD: 18.3 % — HIGH (ref 10.3–14.5)
RBC # FLD: 18.4 % — HIGH (ref 10.3–14.5)
RBC # FLD: 18.4 % — HIGH (ref 10.3–14.5)
RBC # FLD: 18.6 % — HIGH (ref 10.3–14.5)
RBC # FLD: 19 % — HIGH (ref 10.3–14.5)
RBC # FLD: 19.1 % — HIGH (ref 10.3–14.5)
RBC # FLD: 19.3 % — HIGH (ref 10.3–14.5)
RBC # FLD: 19.7 % — HIGH (ref 10.3–14.5)
RBC BLD AUTO: ABNORMAL
RBC BLD AUTO: ABNORMAL
RBC CASTS # UR COMP ASSIST: 2 /HPF — SIGNIFICANT CHANGE UP (ref 0–4)
RH IG SCN BLD-IMP: POSITIVE — SIGNIFICANT CHANGE UP
RH IG SCN BLD-IMP: POSITIVE — SIGNIFICANT CHANGE UP
RSV RNA NPH QL NAA+NON-PROBE: SIGNIFICANT CHANGE UP
S AUREUS DNA NOSE QL NAA+PROBE: SIGNIFICANT CHANGE UP
SAO2 % BLDV: 16.1 % — SIGNIFICANT CHANGE UP
SAO2 % BLDV: 29.3 % — SIGNIFICANT CHANGE UP
SAO2 % BLDV: 58.8 % — SIGNIFICANT CHANGE UP
SARS-COV-2 RNA SPEC QL NAA+PROBE: DETECTED
SMUDGE CELLS # BLD: PRESENT — SIGNIFICANT CHANGE UP
SODIUM SERPL-SCNC: 133 MMOL/L — LOW (ref 135–145)
SODIUM SERPL-SCNC: 134 MMOL/L — LOW (ref 135–145)
SODIUM SERPL-SCNC: 135 MMOL/L — SIGNIFICANT CHANGE UP (ref 135–145)
SODIUM SERPL-SCNC: 135 MMOL/L — SIGNIFICANT CHANGE UP (ref 135–145)
SODIUM SERPL-SCNC: 136 MMOL/L — SIGNIFICANT CHANGE UP (ref 135–145)
SODIUM SERPL-SCNC: 137 MMOL/L — SIGNIFICANT CHANGE UP (ref 135–145)
SODIUM SERPL-SCNC: 138 MMOL/L — SIGNIFICANT CHANGE UP (ref 135–145)
SODIUM SERPL-SCNC: 139 MMOL/L — SIGNIFICANT CHANGE UP (ref 135–145)
SODIUM UR-SCNC: 31 MMOL/L — SIGNIFICANT CHANGE UP
SP GR SPEC: 1.01 — SIGNIFICANT CHANGE UP (ref 1.01–1.05)
SPECIMEN SOURCE: SIGNIFICANT CHANGE UP
SRA INTERP SER-IMP: SIGNIFICANT CHANGE UP
TRIGL SERPL-MCNC: 102 MG/DL — SIGNIFICANT CHANGE UP
TROPONIN T, HIGH SENSITIVITY RESULT: 48 NG/L — SIGNIFICANT CHANGE UP
TROPONIN T, HIGH SENSITIVITY RESULT: 54 NG/L — CRITICAL HIGH
TSH SERPL-MCNC: 0.59 UIU/ML — SIGNIFICANT CHANGE UP (ref 0.27–4.2)
TSH SERPL-MCNC: 0.7 UIU/ML — SIGNIFICANT CHANGE UP (ref 0.27–4.2)
UROBILINOGEN FLD QL: SIGNIFICANT CHANGE UP
UUN UR-MCNC: 527 MG/DL — SIGNIFICANT CHANGE UP
VANCOMYCIN FLD-MCNC: 10.7 UG/ML — SIGNIFICANT CHANGE UP
VANCOMYCIN FLD-MCNC: 7.8 UG/ML — SIGNIFICANT CHANGE UP
VARIANT LYMPHS # BLD: 1.7 % — SIGNIFICANT CHANGE UP (ref 0–6)
VARIANT LYMPHS # BLD: 2.7 % — SIGNIFICANT CHANGE UP (ref 0–6)
WBC # BLD: 11.43 K/UL — HIGH (ref 3.8–10.5)
WBC # BLD: 12.26 K/UL — HIGH (ref 3.8–10.5)
WBC # BLD: 12.91 K/UL — HIGH (ref 3.8–10.5)
WBC # BLD: 14.13 K/UL — HIGH (ref 3.8–10.5)
WBC # BLD: 15.49 K/UL — HIGH (ref 3.8–10.5)
WBC # BLD: 15.62 K/UL — HIGH (ref 3.8–10.5)
WBC # BLD: 5.58 K/UL — SIGNIFICANT CHANGE UP (ref 3.8–10.5)
WBC # BLD: 6.68 K/UL — SIGNIFICANT CHANGE UP (ref 3.8–10.5)
WBC # BLD: 7.63 K/UL — SIGNIFICANT CHANGE UP (ref 3.8–10.5)
WBC # BLD: 8.58 K/UL — SIGNIFICANT CHANGE UP (ref 3.8–10.5)
WBC # BLD: 9.54 K/UL — SIGNIFICANT CHANGE UP (ref 3.8–10.5)
WBC # BLD: 9.55 K/UL — SIGNIFICANT CHANGE UP (ref 3.8–10.5)
WBC # FLD AUTO: 11.43 K/UL — HIGH (ref 3.8–10.5)
WBC # FLD AUTO: 12.26 K/UL — HIGH (ref 3.8–10.5)
WBC # FLD AUTO: 12.91 K/UL — HIGH (ref 3.8–10.5)
WBC # FLD AUTO: 14.13 K/UL — HIGH (ref 3.8–10.5)
WBC # FLD AUTO: 15.49 K/UL — HIGH (ref 3.8–10.5)
WBC # FLD AUTO: 15.62 K/UL — HIGH (ref 3.8–10.5)
WBC # FLD AUTO: 5.58 K/UL — SIGNIFICANT CHANGE UP (ref 3.8–10.5)
WBC # FLD AUTO: 6.68 K/UL — SIGNIFICANT CHANGE UP (ref 3.8–10.5)
WBC # FLD AUTO: 7.63 K/UL — SIGNIFICANT CHANGE UP (ref 3.8–10.5)
WBC # FLD AUTO: 8.58 K/UL — SIGNIFICANT CHANGE UP (ref 3.8–10.5)
WBC # FLD AUTO: 9.54 K/UL — SIGNIFICANT CHANGE UP (ref 3.8–10.5)
WBC # FLD AUTO: 9.55 K/UL — SIGNIFICANT CHANGE UP (ref 3.8–10.5)
WBC UR QL: 1 /HPF — SIGNIFICANT CHANGE UP (ref 0–5)

## 2023-01-01 PROCEDURE — 99291 CRITICAL CARE FIRST HOUR: CPT

## 2023-01-01 PROCEDURE — 93925 LOWER EXTREMITY STUDY: CPT | Mod: 26

## 2023-01-01 PROCEDURE — 76700 US EXAM ABDOM COMPLETE: CPT | Mod: 26

## 2023-01-01 PROCEDURE — 99233 SBSQ HOSP IP/OBS HIGH 50: CPT | Mod: GC

## 2023-01-01 PROCEDURE — 71045 X-RAY EXAM CHEST 1 VIEW: CPT | Mod: 26

## 2023-01-01 PROCEDURE — 71250 CT THORAX DX C-: CPT | Mod: 26

## 2023-01-01 PROCEDURE — 36620 INSERTION CATHETER ARTERY: CPT

## 2023-01-01 PROCEDURE — 93308 TTE F-UP OR LMTD: CPT | Mod: 26,GC

## 2023-01-01 PROCEDURE — 76604 US EXAM CHEST: CPT | Mod: 26,GC

## 2023-01-01 PROCEDURE — 93930 UPPER EXTREMITY STUDY: CPT | Mod: 26

## 2023-01-01 PROCEDURE — 71045 X-RAY EXAM CHEST 1 VIEW: CPT | Mod: 26,76

## 2023-01-01 PROCEDURE — 70450 CT HEAD/BRAIN W/O DYE: CPT | Mod: 26

## 2023-01-01 PROCEDURE — 93306 TTE W/DOPPLER COMPLETE: CPT | Mod: 26

## 2023-01-01 PROCEDURE — 99223 1ST HOSP IP/OBS HIGH 75: CPT

## 2023-01-01 PROCEDURE — 99291 CRITICAL CARE FIRST HOUR: CPT | Mod: 25

## 2023-01-01 PROCEDURE — 99285 EMERGENCY DEPT VISIT HI MDM: CPT

## 2023-01-01 PROCEDURE — 36556 INSERT NON-TUNNEL CV CATH: CPT

## 2023-01-01 RX ORDER — NOREPINEPHRINE BITARTRATE/D5W 8 MG/250ML
0.05 PLASTIC BAG, INJECTION (ML) INTRAVENOUS
Qty: 16 | Refills: 0 | Status: DISCONTINUED | OUTPATIENT
Start: 2023-01-01 | End: 2023-01-01

## 2023-01-01 RX ORDER — DEXAMETHASONE 0.5 MG/5ML
6 ELIXIR ORAL DAILY
Refills: 0 | Status: DISCONTINUED | OUTPATIENT
Start: 2023-01-01 | End: 2023-01-01

## 2023-01-01 RX ORDER — SODIUM CHLORIDE 9 MG/ML
1000 INJECTION, SOLUTION INTRAVENOUS
Refills: 0 | Status: DISCONTINUED | OUTPATIENT
Start: 2023-01-01 | End: 2023-01-01

## 2023-01-01 RX ORDER — ACETAMINOPHEN 500 MG
650 TABLET ORAL EVERY 6 HOURS
Refills: 0 | Status: DISCONTINUED | OUTPATIENT
Start: 2023-01-01 | End: 2023-01-01

## 2023-01-01 RX ORDER — INSULIN LISPRO 100/ML
VIAL (ML) SUBCUTANEOUS AT BEDTIME
Refills: 0 | Status: DISCONTINUED | OUTPATIENT
Start: 2023-01-01 | End: 2023-01-01

## 2023-01-01 RX ORDER — POTASSIUM CHLORIDE 20 MEQ
10 PACKET (EA) ORAL
Refills: 0 | Status: COMPLETED | OUTPATIENT
Start: 2023-01-01 | End: 2023-01-01

## 2023-01-01 RX ORDER — SODIUM BICARBONATE 1 MEQ/ML
50 SYRINGE (ML) INTRAVENOUS ONCE
Refills: 0 | Status: COMPLETED | OUTPATIENT
Start: 2023-01-01 | End: 2023-01-01

## 2023-01-01 RX ORDER — POLYETHYLENE GLYCOL 3350 17 G/17G
17 POWDER, FOR SOLUTION ORAL
Refills: 0 | Status: DISCONTINUED | OUTPATIENT
Start: 2023-01-01 | End: 2023-01-01

## 2023-01-01 RX ORDER — INSULIN LISPRO 100/ML
VIAL (ML) SUBCUTANEOUS EVERY 6 HOURS
Refills: 0 | Status: DISCONTINUED | OUTPATIENT
Start: 2023-01-01 | End: 2023-01-01

## 2023-01-01 RX ORDER — SODIUM CHLORIDE 9 MG/ML
1000 INJECTION, SOLUTION INTRAVENOUS
Refills: 0 | Status: COMPLETED | OUTPATIENT
Start: 2023-01-01 | End: 2023-01-01

## 2023-01-01 RX ORDER — DEXTROSE 50 % IN WATER 50 %
25 SYRINGE (ML) INTRAVENOUS ONCE
Refills: 0 | Status: DISCONTINUED | OUTPATIENT
Start: 2023-01-01 | End: 2023-01-01

## 2023-01-01 RX ORDER — HEPARIN SODIUM 5000 [USP'U]/ML
5000 INJECTION INTRAVENOUS; SUBCUTANEOUS EVERY 8 HOURS
Refills: 0 | Status: DISCONTINUED | OUTPATIENT
Start: 2023-01-01 | End: 2023-01-01

## 2023-01-01 RX ORDER — DEXTROSE 50 % IN WATER 50 %
12.5 SYRINGE (ML) INTRAVENOUS ONCE
Refills: 0 | Status: DISCONTINUED | OUTPATIENT
Start: 2023-01-01 | End: 2023-01-01

## 2023-01-01 RX ORDER — MAGNESIUM SULFATE 500 MG/ML
1 VIAL (ML) INJECTION ONCE
Refills: 0 | Status: COMPLETED | OUTPATIENT
Start: 2023-01-01 | End: 2023-01-01

## 2023-01-01 RX ORDER — PIPERACILLIN AND TAZOBACTAM 4; .5 G/20ML; G/20ML
3.38 INJECTION, POWDER, LYOPHILIZED, FOR SOLUTION INTRAVENOUS EVERY 8 HOURS
Refills: 0 | Status: DISCONTINUED | OUTPATIENT
Start: 2023-01-01 | End: 2023-01-01

## 2023-01-01 RX ORDER — CALCIUM GLUCONATE 100 MG/ML
1 VIAL (ML) INTRAVENOUS ONCE
Refills: 0 | Status: COMPLETED | OUTPATIENT
Start: 2023-01-01 | End: 2023-01-01

## 2023-01-01 RX ORDER — CHLORHEXIDINE GLUCONATE 213 G/1000ML
1 SOLUTION TOPICAL
Refills: 0 | Status: DISCONTINUED | OUTPATIENT
Start: 2023-01-01 | End: 2023-01-01

## 2023-01-01 RX ORDER — MEROPENEM 1 G/30ML
500 INJECTION INTRAVENOUS EVERY 12 HOURS
Refills: 0 | Status: COMPLETED | OUTPATIENT
Start: 2023-01-01 | End: 2023-01-01

## 2023-01-01 RX ORDER — INSULIN LISPRO 100/ML
VIAL (ML) SUBCUTANEOUS
Refills: 0 | Status: DISCONTINUED | OUTPATIENT
Start: 2023-01-01 | End: 2023-01-01

## 2023-01-01 RX ORDER — VANCOMYCIN HCL 1 G
1000 VIAL (EA) INTRAVENOUS ONCE
Refills: 0 | Status: COMPLETED | OUTPATIENT
Start: 2023-01-01 | End: 2023-01-01

## 2023-01-01 RX ORDER — CALCIUM GLUCONATE 100 MG/ML
2 VIAL (ML) INTRAVENOUS
Refills: 0 | Status: COMPLETED | OUTPATIENT
Start: 2023-01-01 | End: 2023-01-01

## 2023-01-01 RX ORDER — NOREPINEPHRINE BITARTRATE/D5W 8 MG/250ML
0.05 PLASTIC BAG, INJECTION (ML) INTRAVENOUS
Qty: 8 | Refills: 0 | Status: DISCONTINUED | OUTPATIENT
Start: 2023-01-01 | End: 2023-01-01

## 2023-01-01 RX ORDER — HUMAN INSULIN 100 [IU]/ML
5 INJECTION, SUSPENSION SUBCUTANEOUS EVERY 6 HOURS
Refills: 0 | Status: DISCONTINUED | OUTPATIENT
Start: 2023-01-01 | End: 2023-01-01

## 2023-01-01 RX ORDER — MIDAZOLAM HYDROCHLORIDE 1 MG/ML
2 INJECTION, SOLUTION INTRAMUSCULAR; INTRAVENOUS ONCE
Refills: 0 | Status: DISCONTINUED | OUTPATIENT
Start: 2023-01-01 | End: 2023-01-01

## 2023-01-01 RX ORDER — CHLORHEXIDINE GLUCONATE 213 G/1000ML
1 SOLUTION TOPICAL DAILY
Refills: 0 | Status: DISCONTINUED | OUTPATIENT
Start: 2023-01-01 | End: 2023-01-01

## 2023-01-01 RX ORDER — MAGNESIUM SULFATE 500 MG/ML
2 VIAL (ML) INJECTION ONCE
Refills: 0 | Status: DISCONTINUED | OUTPATIENT
Start: 2023-01-01 | End: 2023-01-01

## 2023-01-01 RX ORDER — VANCOMYCIN HCL 1 G
1250 VIAL (EA) INTRAVENOUS ONCE
Refills: 0 | Status: DISCONTINUED | OUTPATIENT
Start: 2023-01-01 | End: 2023-01-01

## 2023-01-01 RX ORDER — SODIUM CHLORIDE 9 MG/ML
1000 INJECTION, SOLUTION INTRAVENOUS ONCE
Refills: 0 | Status: COMPLETED | OUTPATIENT
Start: 2023-01-01 | End: 2023-01-01

## 2023-01-01 RX ORDER — CALCIUM GLUCONATE 100 MG/ML
2 VIAL (ML) INTRAVENOUS ONCE
Refills: 0 | Status: COMPLETED | OUTPATIENT
Start: 2023-01-01 | End: 2023-01-01

## 2023-01-01 RX ORDER — DAPAGLIFLOZIN 10 MG/1
0 TABLET, FILM COATED ORAL
Qty: 0 | Refills: 1 | DISCHARGE

## 2023-01-01 RX ORDER — POTASSIUM CHLORIDE 20 MEQ
20 PACKET (EA) ORAL ONCE
Refills: 0 | Status: COMPLETED | OUTPATIENT
Start: 2023-01-01 | End: 2023-01-01

## 2023-01-01 RX ORDER — POTASSIUM CHLORIDE 20 MEQ
10 PACKET (EA) ORAL
Refills: 0 | Status: DISCONTINUED | OUTPATIENT
Start: 2023-01-01 | End: 2023-01-01

## 2023-01-01 RX ORDER — PROPOFOL 10 MG/ML
65.79 INJECTION, EMULSION INTRAVENOUS
Qty: 1000 | Refills: 0 | Status: DISCONTINUED | OUTPATIENT
Start: 2023-01-01 | End: 2023-01-01

## 2023-01-01 RX ORDER — VASOPRESSIN 20 [USP'U]/ML
0.04 INJECTION INTRAVENOUS
Qty: 40 | Refills: 0 | Status: DISCONTINUED | OUTPATIENT
Start: 2023-01-01 | End: 2023-01-01

## 2023-01-01 RX ORDER — PANTOPRAZOLE SODIUM 20 MG/1
40 TABLET, DELAYED RELEASE ORAL DAILY
Refills: 0 | Status: DISCONTINUED | OUTPATIENT
Start: 2023-01-01 | End: 2023-01-01

## 2023-01-01 RX ORDER — SODIUM CHLORIDE 9 MG/ML
1000 INJECTION INTRAMUSCULAR; INTRAVENOUS; SUBCUTANEOUS ONCE
Refills: 0 | Status: COMPLETED | OUTPATIENT
Start: 2023-01-01 | End: 2023-01-01

## 2023-01-01 RX ORDER — AZITHROMYCIN 500 MG/1
500 TABLET, FILM COATED ORAL ONCE
Refills: 0 | Status: COMPLETED | OUTPATIENT
Start: 2023-01-01 | End: 2023-01-01

## 2023-01-01 RX ORDER — SODIUM CHLORIDE 9 MG/ML
1000 INJECTION INTRAMUSCULAR; INTRAVENOUS; SUBCUTANEOUS
Refills: 0 | Status: DISCONTINUED | OUTPATIENT
Start: 2023-01-01 | End: 2023-01-01

## 2023-01-01 RX ORDER — SENNA PLUS 8.6 MG/1
2 TABLET ORAL AT BEDTIME
Refills: 0 | Status: DISCONTINUED | OUTPATIENT
Start: 2023-01-01 | End: 2023-01-01

## 2023-01-01 RX ORDER — BUMETANIDE 0.25 MG/ML
2 INJECTION INTRAMUSCULAR; INTRAVENOUS ONCE
Refills: 0 | Status: COMPLETED | OUTPATIENT
Start: 2023-01-01 | End: 2023-01-01

## 2023-01-01 RX ORDER — CARVEDILOL PHOSPHATE 80 MG/1
0 CAPSULE, EXTENDED RELEASE ORAL
Qty: 0 | Refills: 0 | DISCHARGE

## 2023-01-01 RX ORDER — DEXTROSE 50 % IN WATER 50 %
15 SYRINGE (ML) INTRAVENOUS ONCE
Refills: 0 | Status: DISCONTINUED | OUTPATIENT
Start: 2023-01-01 | End: 2023-01-01

## 2023-01-01 RX ORDER — AZITHROMYCIN 500 MG/1
TABLET, FILM COATED ORAL
Refills: 0 | Status: DISCONTINUED | OUTPATIENT
Start: 2023-01-01 | End: 2023-01-01

## 2023-01-01 RX ORDER — BUDESONIDE AND FORMOTEROL FUMARATE DIHYDRATE 160; 4.5 UG/1; UG/1
2 AEROSOL RESPIRATORY (INHALATION)
Refills: 0 | Status: DISCONTINUED | OUTPATIENT
Start: 2023-01-01 | End: 2023-01-01

## 2023-01-01 RX ORDER — ATORVASTATIN CALCIUM 80 MG/1
0 TABLET, FILM COATED ORAL
Qty: 0 | Refills: 0 | DISCHARGE

## 2023-01-01 RX ORDER — PIPERACILLIN AND TAZOBACTAM 4; .5 G/20ML; G/20ML
3.38 INJECTION, POWDER, LYOPHILIZED, FOR SOLUTION INTRAVENOUS ONCE
Refills: 0 | Status: COMPLETED | OUTPATIENT
Start: 2023-01-01 | End: 2023-01-01

## 2023-01-01 RX ORDER — AZITHROMYCIN 500 MG/1
500 TABLET, FILM COATED ORAL EVERY 24 HOURS
Refills: 0 | Status: DISCONTINUED | OUTPATIENT
Start: 2023-01-01 | End: 2023-01-01

## 2023-01-01 RX ORDER — PIPERACILLIN AND TAZOBACTAM 4; .5 G/20ML; G/20ML
3.38 INJECTION, POWDER, LYOPHILIZED, FOR SOLUTION INTRAVENOUS EVERY 12 HOURS
Refills: 0 | Status: DISCONTINUED | OUTPATIENT
Start: 2023-01-01 | End: 2023-01-01

## 2023-01-01 RX ORDER — GLUCAGON INJECTION, SOLUTION 0.5 MG/.1ML
1 INJECTION, SOLUTION SUBCUTANEOUS ONCE
Refills: 0 | Status: DISCONTINUED | OUTPATIENT
Start: 2023-01-01 | End: 2023-01-01

## 2023-01-01 RX ORDER — ATORVASTATIN CALCIUM 80 MG/1
20 TABLET, FILM COATED ORAL AT BEDTIME
Refills: 0 | Status: DISCONTINUED | OUTPATIENT
Start: 2023-01-01 | End: 2023-01-01

## 2023-01-01 RX ORDER — PHENYLEPHRINE HYDROCHLORIDE 10 MG/ML
0.1 INJECTION INTRAVENOUS
Qty: 40 | Refills: 0 | Status: DISCONTINUED | OUTPATIENT
Start: 2023-01-01 | End: 2023-01-01

## 2023-01-01 RX ORDER — BUMETANIDE 0.25 MG/ML
4 INJECTION INTRAMUSCULAR; INTRAVENOUS ONCE
Refills: 0 | Status: COMPLETED | OUTPATIENT
Start: 2023-01-01 | End: 2023-01-01

## 2023-01-01 RX ORDER — HUMAN INSULIN 100 [IU]/ML
7 INJECTION, SUSPENSION SUBCUTANEOUS EVERY 6 HOURS
Refills: 0 | Status: DISCONTINUED | OUTPATIENT
Start: 2023-01-01 | End: 2023-01-01

## 2023-01-01 RX ORDER — MEROPENEM 1 G/30ML
INJECTION INTRAVENOUS
Refills: 0 | Status: COMPLETED | OUTPATIENT
Start: 2023-01-01 | End: 2023-01-01

## 2023-01-01 RX ORDER — MEROPENEM 1 G/30ML
500 INJECTION INTRAVENOUS ONCE
Refills: 0 | Status: COMPLETED | OUTPATIENT
Start: 2023-01-01 | End: 2023-01-01

## 2023-01-01 RX ORDER — ROBINUL 0.2 MG/ML
0.2 INJECTION INTRAMUSCULAR; INTRAVENOUS
Refills: 0 | Status: DISCONTINUED | OUTPATIENT
Start: 2023-01-01 | End: 2023-01-01

## 2023-01-01 RX ORDER — FOLIC ACID 0.8 MG
1 TABLET ORAL DAILY
Refills: 0 | Status: DISCONTINUED | OUTPATIENT
Start: 2023-01-01 | End: 2023-01-01

## 2023-01-01 RX ORDER — AMLODIPINE BESYLATE 2.5 MG/1
0 TABLET ORAL
Qty: 0 | Refills: 1 | DISCHARGE

## 2023-01-01 RX ORDER — THIAMINE MONONITRATE (VIT B1) 100 MG
500 TABLET ORAL EVERY 8 HOURS
Refills: 0 | Status: DISCONTINUED | OUTPATIENT
Start: 2023-01-01 | End: 2023-01-01

## 2023-01-01 RX ORDER — ALBUMIN HUMAN 25 %
250 VIAL (ML) INTRAVENOUS ONCE
Refills: 0 | Status: COMPLETED | OUTPATIENT
Start: 2023-01-01 | End: 2023-01-01

## 2023-01-01 RX ORDER — POTASSIUM CHLORIDE 20 MEQ
40 PACKET (EA) ORAL ONCE
Refills: 0 | Status: COMPLETED | OUTPATIENT
Start: 2023-01-01 | End: 2023-01-01

## 2023-01-01 RX ORDER — POTASSIUM CHLORIDE 20 MEQ
1 PACKET (EA) ORAL
Qty: 0 | Refills: 0 | DISCHARGE

## 2023-01-01 RX ORDER — SACUBITRIL AND VALSARTAN 24; 26 MG/1; MG/1
0 TABLET, FILM COATED ORAL
Qty: 0 | Refills: 0 | DISCHARGE

## 2023-01-01 RX ORDER — ACETAMINOPHEN 500 MG
1000 TABLET ORAL ONCE
Refills: 0 | Status: COMPLETED | OUTPATIENT
Start: 2023-01-01 | End: 2023-01-01

## 2023-01-01 RX ORDER — DONEPEZIL HYDROCHLORIDE 10 MG/1
0 TABLET, FILM COATED ORAL
Qty: 0 | Refills: 0 | DISCHARGE

## 2023-01-01 RX ORDER — HYDROMORPHONE HYDROCHLORIDE 2 MG/ML
0.5 INJECTION INTRAMUSCULAR; INTRAVENOUS; SUBCUTANEOUS
Refills: 0 | Status: DISCONTINUED | OUTPATIENT
Start: 2023-01-01 | End: 2023-01-01

## 2023-01-01 RX ORDER — MIDODRINE HYDROCHLORIDE 2.5 MG/1
30 TABLET ORAL THREE TIMES A DAY
Refills: 0 | Status: DISCONTINUED | OUTPATIENT
Start: 2023-01-01 | End: 2023-01-01

## 2023-01-01 RX ORDER — SODIUM BICARBONATE 1 MEQ/ML
0.2 SYRINGE (ML) INTRAVENOUS
Qty: 150 | Refills: 0 | Status: DISCONTINUED | OUTPATIENT
Start: 2023-01-01 | End: 2023-01-01

## 2023-01-01 RX ORDER — IPRATROPIUM/ALBUTEROL SULFATE 18-103MCG
3 AEROSOL WITH ADAPTER (GRAM) INHALATION ONCE
Refills: 0 | Status: COMPLETED | OUTPATIENT
Start: 2023-01-01 | End: 2023-01-01

## 2023-01-01 RX ADMIN — HEPARIN SODIUM 5000 UNIT(S): 5000 INJECTION INTRAVENOUS; SUBCUTANEOUS at 22:15

## 2023-01-01 RX ADMIN — ATORVASTATIN CALCIUM 20 MILLIGRAM(S): 80 TABLET, FILM COATED ORAL at 21:32

## 2023-01-01 RX ADMIN — Medication 2: at 23:19

## 2023-01-01 RX ADMIN — Medication 100 GRAM(S): at 02:30

## 2023-01-01 RX ADMIN — Medication 100 MILLIEQUIVALENT(S): at 01:27

## 2023-01-01 RX ADMIN — Medication 3.56 MICROGRAM(S)/KG/MIN: at 07:45

## 2023-01-01 RX ADMIN — Medication 6 MILLIGRAM(S): at 06:08

## 2023-01-01 RX ADMIN — VASOPRESSIN 6 UNIT(S)/MIN: 20 INJECTION INTRAVENOUS at 07:36

## 2023-01-01 RX ADMIN — VASOPRESSIN 6 UNIT(S)/MIN: 20 INJECTION INTRAVENOUS at 08:27

## 2023-01-01 RX ADMIN — AZITHROMYCIN 255 MILLIGRAM(S): 500 TABLET, FILM COATED ORAL at 09:00

## 2023-01-01 RX ADMIN — CHLORHEXIDINE GLUCONATE 1 APPLICATION(S): 213 SOLUTION TOPICAL at 12:12

## 2023-01-01 RX ADMIN — BUDESONIDE AND FORMOTEROL FUMARATE DIHYDRATE 2 PUFF(S): 160; 4.5 AEROSOL RESPIRATORY (INHALATION) at 22:10

## 2023-01-01 RX ADMIN — VASOPRESSIN 6 UNIT(S)/MIN: 20 INJECTION INTRAVENOUS at 19:16

## 2023-01-01 RX ADMIN — PHENYLEPHRINE HYDROCHLORIDE 2.85 MICROGRAM(S)/KG/MIN: 10 INJECTION INTRAVENOUS at 07:36

## 2023-01-01 RX ADMIN — Medication 2: at 18:52

## 2023-01-01 RX ADMIN — Medication 1 MILLIGRAM(S): at 04:41

## 2023-01-01 RX ADMIN — MEROPENEM 100 MILLIGRAM(S): 1 INJECTION INTRAVENOUS at 21:45

## 2023-01-01 RX ADMIN — PANTOPRAZOLE SODIUM 40 MILLIGRAM(S): 20 TABLET, DELAYED RELEASE ORAL at 05:16

## 2023-01-01 RX ADMIN — CHLORHEXIDINE GLUCONATE 1 APPLICATION(S): 213 SOLUTION TOPICAL at 06:03

## 2023-01-01 RX ADMIN — PROPOFOL 30 MICROGRAM(S)/KG/MIN: 10 INJECTION, EMULSION INTRAVENOUS at 20:12

## 2023-01-01 RX ADMIN — HUMAN INSULIN 7 UNIT(S): 100 INJECTION, SUSPENSION SUBCUTANEOUS at 18:53

## 2023-01-01 RX ADMIN — MIDAZOLAM HYDROCHLORIDE 2 MILLIGRAM(S): 1 INJECTION, SOLUTION INTRAMUSCULAR; INTRAVENOUS at 14:05

## 2023-01-01 RX ADMIN — PIPERACILLIN AND TAZOBACTAM 25 GRAM(S): 4; .5 INJECTION, POWDER, LYOPHILIZED, FOR SOLUTION INTRAVENOUS at 00:16

## 2023-01-01 RX ADMIN — BUDESONIDE AND FORMOTEROL FUMARATE DIHYDRATE 2 PUFF(S): 160; 4.5 AEROSOL RESPIRATORY (INHALATION) at 09:27

## 2023-01-01 RX ADMIN — Medication 100 MILLIEQUIVALENT(S): at 06:08

## 2023-01-01 RX ADMIN — Medication 100 MEQ/KG/HR: at 19:15

## 2023-01-01 RX ADMIN — POLYETHYLENE GLYCOL 3350 17 GRAM(S): 17 POWDER, FOR SOLUTION ORAL at 06:19

## 2023-01-01 RX ADMIN — HUMAN INSULIN 7 UNIT(S): 100 INJECTION, SUSPENSION SUBCUTANEOUS at 23:20

## 2023-01-01 RX ADMIN — Medication 2: at 00:42

## 2023-01-01 RX ADMIN — Medication 40 MILLIEQUIVALENT(S): at 02:45

## 2023-01-01 RX ADMIN — ATORVASTATIN CALCIUM 20 MILLIGRAM(S): 80 TABLET, FILM COATED ORAL at 21:03

## 2023-01-01 RX ADMIN — VASOPRESSIN 6 UNIT(S)/MIN: 20 INJECTION INTRAVENOUS at 20:11

## 2023-01-01 RX ADMIN — Medication 200 GRAM(S): at 12:48

## 2023-01-01 RX ADMIN — Medication 4: at 00:18

## 2023-01-01 RX ADMIN — PROPOFOL 30 MICROGRAM(S)/KG/MIN: 10 INJECTION, EMULSION INTRAVENOUS at 16:57

## 2023-01-01 RX ADMIN — Medication 100 MILLIEQUIVALENT(S): at 05:09

## 2023-01-01 RX ADMIN — Medication 8: at 23:47

## 2023-01-01 RX ADMIN — HUMAN INSULIN 7 UNIT(S): 100 INJECTION, SUSPENSION SUBCUTANEOUS at 06:18

## 2023-01-01 RX ADMIN — POLYETHYLENE GLYCOL 3350 17 GRAM(S): 17 POWDER, FOR SOLUTION ORAL at 06:36

## 2023-01-01 RX ADMIN — VASOPRESSIN 6 UNIT(S)/MIN: 20 INJECTION INTRAVENOUS at 19:14

## 2023-01-01 RX ADMIN — Medication 400 MILLIGRAM(S): at 12:30

## 2023-01-01 RX ADMIN — PHENYLEPHRINE HYDROCHLORIDE 2.85 MICROGRAM(S)/KG/MIN: 10 INJECTION INTRAVENOUS at 20:24

## 2023-01-01 RX ADMIN — Medication 7.13 MICROGRAM(S)/KG/MIN: at 16:57

## 2023-01-01 RX ADMIN — HUMAN INSULIN 7 UNIT(S): 100 INJECTION, SUSPENSION SUBCUTANEOUS at 06:36

## 2023-01-01 RX ADMIN — SODIUM CHLORIDE 16.67 MILLILITER(S): 9 INJECTION, SOLUTION INTRAVENOUS at 18:00

## 2023-01-01 RX ADMIN — Medication 200 GRAM(S): at 11:03

## 2023-01-01 RX ADMIN — BUDESONIDE AND FORMOTEROL FUMARATE DIHYDRATE 2 PUFF(S): 160; 4.5 AEROSOL RESPIRATORY (INHALATION) at 11:06

## 2023-01-01 RX ADMIN — Medication 6 MILLIGRAM(S): at 05:20

## 2023-01-01 RX ADMIN — MEROPENEM 100 MILLIGRAM(S): 1 INJECTION INTRAVENOUS at 10:34

## 2023-01-01 RX ADMIN — MEROPENEM 100 MILLIGRAM(S): 1 INJECTION INTRAVENOUS at 11:19

## 2023-01-01 RX ADMIN — VASOPRESSIN 6 UNIT(S)/MIN: 20 INJECTION INTRAVENOUS at 09:43

## 2023-01-01 RX ADMIN — HUMAN INSULIN 5 UNIT(S): 100 INJECTION, SUSPENSION SUBCUTANEOUS at 11:04

## 2023-01-01 RX ADMIN — Medication 50 MILLIEQUIVALENT(S): at 06:34

## 2023-01-01 RX ADMIN — HEPARIN SODIUM 5000 UNIT(S): 5000 INJECTION INTRAVENOUS; SUBCUTANEOUS at 05:22

## 2023-01-01 RX ADMIN — POLYETHYLENE GLYCOL 3350 17 GRAM(S): 17 POWDER, FOR SOLUTION ORAL at 17:40

## 2023-01-01 RX ADMIN — CHLORHEXIDINE GLUCONATE 1 APPLICATION(S): 213 SOLUTION TOPICAL at 12:10

## 2023-01-01 RX ADMIN — VASOPRESSIN 6 UNIT(S)/MIN: 20 INJECTION INTRAVENOUS at 07:14

## 2023-01-01 RX ADMIN — Medication 2: at 00:50

## 2023-01-01 RX ADMIN — CHLORHEXIDINE GLUCONATE 1 APPLICATION(S): 213 SOLUTION TOPICAL at 11:30

## 2023-01-01 RX ADMIN — CHLORHEXIDINE GLUCONATE 1 APPLICATION(S): 213 SOLUTION TOPICAL at 11:03

## 2023-01-01 RX ADMIN — PANTOPRAZOLE SODIUM 40 MILLIGRAM(S): 20 TABLET, DELAYED RELEASE ORAL at 06:07

## 2023-01-01 RX ADMIN — Medication 3.56 MICROGRAM(S)/KG/MIN: at 17:43

## 2023-01-01 RX ADMIN — SODIUM CHLORIDE 75 MILLILITER(S): 9 INJECTION, SOLUTION INTRAVENOUS at 11:10

## 2023-01-01 RX ADMIN — HUMAN INSULIN 7 UNIT(S): 100 INJECTION, SUSPENSION SUBCUTANEOUS at 11:47

## 2023-01-01 RX ADMIN — Medication 3.56 MICROGRAM(S)/KG/MIN: at 19:16

## 2023-01-01 RX ADMIN — Medication 2: at 18:47

## 2023-01-01 RX ADMIN — Medication 650 MILLIGRAM(S): at 11:44

## 2023-01-01 RX ADMIN — Medication 100 MEQ/KG/HR: at 07:46

## 2023-01-01 RX ADMIN — HUMAN INSULIN 7 UNIT(S): 100 INJECTION, SUSPENSION SUBCUTANEOUS at 00:19

## 2023-01-01 RX ADMIN — PIPERACILLIN AND TAZOBACTAM 25 GRAM(S): 4; .5 INJECTION, POWDER, LYOPHILIZED, FOR SOLUTION INTRAVENOUS at 15:47

## 2023-01-01 RX ADMIN — Medication 6: at 05:00

## 2023-01-01 RX ADMIN — Medication 100 GRAM(S): at 09:02

## 2023-01-01 RX ADMIN — VASOPRESSIN 6 UNIT(S)/MIN: 20 INJECTION INTRAVENOUS at 03:18

## 2023-01-01 RX ADMIN — BUDESONIDE AND FORMOTEROL FUMARATE DIHYDRATE 2 PUFF(S): 160; 4.5 AEROSOL RESPIRATORY (INHALATION) at 00:54

## 2023-01-01 RX ADMIN — Medication 100 MILLIEQUIVALENT(S): at 07:14

## 2023-01-01 RX ADMIN — Medication 100 MILLIEQUIVALENT(S): at 20:11

## 2023-01-01 RX ADMIN — HUMAN INSULIN 7 UNIT(S): 100 INJECTION, SUSPENSION SUBCUTANEOUS at 00:42

## 2023-01-01 RX ADMIN — HEPARIN SODIUM 5000 UNIT(S): 5000 INJECTION INTRAVENOUS; SUBCUTANEOUS at 21:44

## 2023-01-01 RX ADMIN — PROPOFOL 30 MICROGRAM(S)/KG/MIN: 10 INJECTION, EMULSION INTRAVENOUS at 07:14

## 2023-01-01 RX ADMIN — CHLORHEXIDINE GLUCONATE 1 APPLICATION(S): 213 SOLUTION TOPICAL at 11:08

## 2023-01-01 RX ADMIN — BUDESONIDE AND FORMOTEROL FUMARATE DIHYDRATE 2 PUFF(S): 160; 4.5 AEROSOL RESPIRATORY (INHALATION) at 07:32

## 2023-01-01 RX ADMIN — BUDESONIDE AND FORMOTEROL FUMARATE DIHYDRATE 2 PUFF(S): 160; 4.5 AEROSOL RESPIRATORY (INHALATION) at 10:04

## 2023-01-01 RX ADMIN — Medication 100 MILLIEQUIVALENT(S): at 02:30

## 2023-01-01 RX ADMIN — VASOPRESSIN 6 UNIT(S)/MIN: 20 INJECTION INTRAVENOUS at 07:49

## 2023-01-01 RX ADMIN — VASOPRESSIN 6 UNIT(S)/MIN: 20 INJECTION INTRAVENOUS at 07:45

## 2023-01-01 RX ADMIN — Medication 7.13 MICROGRAM(S)/KG/MIN: at 07:20

## 2023-01-01 RX ADMIN — Medication 3 MILLILITER(S): at 10:20

## 2023-01-01 RX ADMIN — Medication 100 MILLIEQUIVALENT(S): at 20:45

## 2023-01-01 RX ADMIN — BUDESONIDE AND FORMOTEROL FUMARATE DIHYDRATE 2 PUFF(S): 160; 4.5 AEROSOL RESPIRATORY (INHALATION) at 22:01

## 2023-01-01 RX ADMIN — PHENYLEPHRINE HYDROCHLORIDE 2.85 MICROGRAM(S)/KG/MIN: 10 INJECTION INTRAVENOUS at 09:46

## 2023-01-01 RX ADMIN — CHLORHEXIDINE GLUCONATE 1 APPLICATION(S): 213 SOLUTION TOPICAL at 11:58

## 2023-01-01 RX ADMIN — Medication 6 MILLIGRAM(S): at 18:40

## 2023-01-01 RX ADMIN — VASOPRESSIN 6 UNIT(S)/MIN: 20 INJECTION INTRAVENOUS at 20:25

## 2023-01-01 RX ADMIN — VASOPRESSIN 6 UNIT(S)/MIN: 20 INJECTION INTRAVENOUS at 17:44

## 2023-01-01 RX ADMIN — Medication 6 MILLIGRAM(S): at 05:16

## 2023-01-01 RX ADMIN — ATORVASTATIN CALCIUM 20 MILLIGRAM(S): 80 TABLET, FILM COATED ORAL at 22:06

## 2023-01-01 RX ADMIN — MEROPENEM 100 MILLIGRAM(S): 1 INJECTION INTRAVENOUS at 10:10

## 2023-01-01 RX ADMIN — PHENYLEPHRINE HYDROCHLORIDE 2.85 MICROGRAM(S)/KG/MIN: 10 INJECTION INTRAVENOUS at 18:03

## 2023-01-01 RX ADMIN — Medication 3.56 MICROGRAM(S)/KG/MIN: at 07:15

## 2023-01-01 RX ADMIN — Medication 3.56 MICROGRAM(S)/KG/MIN: at 18:55

## 2023-01-01 RX ADMIN — VASOPRESSIN 6 UNIT(S)/MIN: 20 INJECTION INTRAVENOUS at 10:18

## 2023-01-01 RX ADMIN — SODIUM CHLORIDE 16.67 MILLILITER(S): 9 INJECTION, SOLUTION INTRAVENOUS at 22:18

## 2023-01-01 RX ADMIN — VASOPRESSIN 6 UNIT(S)/MIN: 20 INJECTION INTRAVENOUS at 19:42

## 2023-01-01 RX ADMIN — BUMETANIDE 2 MILLIGRAM(S): 0.25 INJECTION INTRAMUSCULAR; INTRAVENOUS at 13:16

## 2023-01-01 RX ADMIN — HUMAN INSULIN 5 UNIT(S): 100 INJECTION, SUSPENSION SUBCUTANEOUS at 05:00

## 2023-01-01 RX ADMIN — Medication 2: at 05:18

## 2023-01-01 RX ADMIN — SODIUM CHLORIDE 75 MILLILITER(S): 9 INJECTION, SOLUTION INTRAVENOUS at 19:59

## 2023-01-01 RX ADMIN — HUMAN INSULIN 7 UNIT(S): 100 INJECTION, SUSPENSION SUBCUTANEOUS at 18:48

## 2023-01-01 RX ADMIN — Medication 50 MILLIEQUIVALENT(S): at 17:35

## 2023-01-01 RX ADMIN — Medication 2: at 17:53

## 2023-01-01 RX ADMIN — BUDESONIDE AND FORMOTEROL FUMARATE DIHYDRATE 2 PUFF(S): 160; 4.5 AEROSOL RESPIRATORY (INHALATION) at 22:12

## 2023-01-01 RX ADMIN — Medication 3.56 MICROGRAM(S)/KG/MIN: at 19:38

## 2023-01-01 RX ADMIN — Medication 4: at 17:07

## 2023-01-01 RX ADMIN — HUMAN INSULIN 5 UNIT(S): 100 INJECTION, SUSPENSION SUBCUTANEOUS at 05:38

## 2023-01-01 RX ADMIN — HUMAN INSULIN 5 UNIT(S): 100 INJECTION, SUSPENSION SUBCUTANEOUS at 22:18

## 2023-01-01 RX ADMIN — BUMETANIDE 132 MILLIGRAM(S): 0.25 INJECTION INTRAMUSCULAR; INTRAVENOUS at 23:48

## 2023-01-01 RX ADMIN — Medication 2: at 13:29

## 2023-01-01 RX ADMIN — VASOPRESSIN 6 UNIT(S)/MIN: 20 INJECTION INTRAVENOUS at 02:00

## 2023-01-01 RX ADMIN — Medication 2: at 06:35

## 2023-01-01 RX ADMIN — SENNA PLUS 2 TABLET(S): 8.6 TABLET ORAL at 23:08

## 2023-01-01 RX ADMIN — HUMAN INSULIN 7 UNIT(S): 100 INJECTION, SUSPENSION SUBCUTANEOUS at 11:38

## 2023-01-01 RX ADMIN — Medication 50 MILLILITER(S): at 07:35

## 2023-01-01 RX ADMIN — MIDODRINE HYDROCHLORIDE 30 MILLIGRAM(S): 2.5 TABLET ORAL at 18:09

## 2023-01-01 RX ADMIN — PROPOFOL 30 MICROGRAM(S)/KG/MIN: 10 INJECTION, EMULSION INTRAVENOUS at 19:15

## 2023-01-01 RX ADMIN — Medication 3.56 MICROGRAM(S)/KG/MIN: at 07:36

## 2023-01-01 RX ADMIN — Medication 650 MILLIGRAM(S): at 10:00

## 2023-01-01 RX ADMIN — CHLORHEXIDINE GLUCONATE 1 APPLICATION(S): 213 SOLUTION TOPICAL at 13:16

## 2023-01-01 RX ADMIN — Medication 250 MILLIGRAM(S): at 14:29

## 2023-01-01 RX ADMIN — Medication 650 MILLIGRAM(S): at 20:37

## 2023-01-01 RX ADMIN — Medication 2: at 13:45

## 2023-01-01 RX ADMIN — Medication 1: at 07:23

## 2023-01-01 RX ADMIN — Medication 5: at 11:49

## 2023-01-01 RX ADMIN — MEROPENEM 100 MILLIGRAM(S): 1 INJECTION INTRAVENOUS at 21:32

## 2023-01-01 RX ADMIN — HEPARIN SODIUM 5000 UNIT(S): 5000 INJECTION INTRAVENOUS; SUBCUTANEOUS at 16:57

## 2023-01-01 RX ADMIN — Medication 2: at 06:18

## 2023-01-01 RX ADMIN — HUMAN INSULIN 5 UNIT(S): 100 INJECTION, SUSPENSION SUBCUTANEOUS at 17:53

## 2023-01-01 RX ADMIN — Medication 6: at 17:29

## 2023-01-01 RX ADMIN — MIDODRINE HYDROCHLORIDE 30 MILLIGRAM(S): 2.5 TABLET ORAL at 05:20

## 2023-01-01 RX ADMIN — Medication 3.56 MICROGRAM(S)/KG/MIN: at 20:26

## 2023-01-01 RX ADMIN — Medication 100 MILLIEQUIVALENT(S): at 09:00

## 2023-01-01 RX ADMIN — Medication 3.56 MICROGRAM(S)/KG/MIN: at 19:42

## 2023-01-01 RX ADMIN — HUMAN INSULIN 7 UNIT(S): 100 INJECTION, SUSPENSION SUBCUTANEOUS at 17:06

## 2023-01-01 RX ADMIN — PIPERACILLIN AND TAZOBACTAM 200 GRAM(S): 4; .5 INJECTION, POWDER, LYOPHILIZED, FOR SOLUTION INTRAVENOUS at 12:12

## 2023-01-01 RX ADMIN — Medication 4: at 17:42

## 2023-01-01 RX ADMIN — MEROPENEM 100 MILLIGRAM(S): 1 INJECTION INTRAVENOUS at 09:51

## 2023-01-01 RX ADMIN — HUMAN INSULIN 7 UNIT(S): 100 INJECTION, SUSPENSION SUBCUTANEOUS at 06:05

## 2023-01-01 RX ADMIN — Medication 1000 MILLIGRAM(S): at 14:19

## 2023-01-01 RX ADMIN — MEROPENEM 100 MILLIGRAM(S): 1 INJECTION INTRAVENOUS at 21:33

## 2023-01-01 RX ADMIN — Medication 650 MILLIGRAM(S): at 19:47

## 2023-01-01 RX ADMIN — Medication 7.13 MICROGRAM(S)/KG/MIN: at 19:59

## 2023-01-01 RX ADMIN — Medication 3.56 MICROGRAM(S)/KG/MIN: at 08:21

## 2023-01-01 RX ADMIN — Medication 2 MILLIGRAM(S): at 14:39

## 2023-01-01 RX ADMIN — Medication 6 MILLIGRAM(S): at 06:50

## 2023-01-01 RX ADMIN — Medication 100 MILLIEQUIVALENT(S): at 07:35

## 2023-01-01 RX ADMIN — PHENYLEPHRINE HYDROCHLORIDE 2.85 MICROGRAM(S)/KG/MIN: 10 INJECTION INTRAVENOUS at 19:14

## 2023-01-01 RX ADMIN — SENNA PLUS 2 TABLET(S): 8.6 TABLET ORAL at 21:03

## 2023-01-01 RX ADMIN — Medication 100 MILLIEQUIVALENT(S): at 06:10

## 2023-01-01 RX ADMIN — AZITHROMYCIN 255 MILLIGRAM(S): 500 TABLET, FILM COATED ORAL at 07:43

## 2023-01-01 RX ADMIN — AZITHROMYCIN 255 MILLIGRAM(S): 500 TABLET, FILM COATED ORAL at 08:01

## 2023-01-01 RX ADMIN — POLYETHYLENE GLYCOL 3350 17 GRAM(S): 17 POWDER, FOR SOLUTION ORAL at 18:23

## 2023-01-01 RX ADMIN — SENNA PLUS 2 TABLET(S): 8.6 TABLET ORAL at 21:45

## 2023-01-01 RX ADMIN — HUMAN INSULIN 7 UNIT(S): 100 INJECTION, SUSPENSION SUBCUTANEOUS at 00:49

## 2023-01-01 RX ADMIN — Medication 100 MEQ/KG/HR: at 06:34

## 2023-01-01 RX ADMIN — VASOPRESSIN 6 UNIT(S)/MIN: 20 INJECTION INTRAVENOUS at 18:55

## 2023-01-01 RX ADMIN — Medication 2: at 18:18

## 2023-01-01 RX ADMIN — VASOPRESSIN 6 UNIT(S)/MIN: 20 INJECTION INTRAVENOUS at 19:37

## 2023-01-01 RX ADMIN — CHLORHEXIDINE GLUCONATE 1 APPLICATION(S): 213 SOLUTION TOPICAL at 12:48

## 2023-01-01 RX ADMIN — Medication 3.56 MICROGRAM(S)/KG/MIN: at 19:14

## 2023-01-01 RX ADMIN — SODIUM CHLORIDE 1000 MILLILITER(S): 9 INJECTION INTRAMUSCULAR; INTRAVENOUS; SUBCUTANEOUS at 06:24

## 2023-01-01 RX ADMIN — PROPOFOL 30 MICROGRAM(S)/KG/MIN: 10 INJECTION, EMULSION INTRAVENOUS at 19:59

## 2023-01-01 RX ADMIN — HUMAN INSULIN 7 UNIT(S): 100 INJECTION, SUSPENSION SUBCUTANEOUS at 13:30

## 2023-01-01 RX ADMIN — Medication 3.56 MICROGRAM(S)/KG/MIN: at 10:17

## 2023-01-01 RX ADMIN — Medication 8: at 05:16

## 2023-01-01 RX ADMIN — HUMAN INSULIN 7 UNIT(S): 100 INJECTION, SUSPENSION SUBCUTANEOUS at 18:24

## 2023-01-01 RX ADMIN — PROPOFOL 30 MICROGRAM(S)/KG/MIN: 10 INJECTION, EMULSION INTRAVENOUS at 19:45

## 2023-01-01 RX ADMIN — Medication 20 MILLIEQUIVALENT(S): at 10:16

## 2023-01-01 RX ADMIN — MEROPENEM 100 MILLIGRAM(S): 1 INJECTION INTRAVENOUS at 21:03

## 2023-01-01 RX ADMIN — SODIUM CHLORIDE 75 MILLILITER(S): 9 INJECTION, SOLUTION INTRAVENOUS at 01:45

## 2023-01-01 RX ADMIN — MEROPENEM 100 MILLIGRAM(S): 1 INJECTION INTRAVENOUS at 10:17

## 2023-01-01 RX ADMIN — VASOPRESSIN 6 UNIT(S)/MIN: 20 INJECTION INTRAVENOUS at 07:20

## 2023-01-01 RX ADMIN — MEROPENEM 100 MILLIGRAM(S): 1 INJECTION INTRAVENOUS at 11:03

## 2023-01-01 RX ADMIN — BUDESONIDE AND FORMOTEROL FUMARATE DIHYDRATE 2 PUFF(S): 160; 4.5 AEROSOL RESPIRATORY (INHALATION) at 21:43

## 2023-01-01 RX ADMIN — MEROPENEM 100 MILLIGRAM(S): 1 INJECTION INTRAVENOUS at 10:59

## 2023-01-01 RX ADMIN — Medication 3.56 MICROGRAM(S)/KG/MIN: at 20:11

## 2023-01-01 RX ADMIN — ATORVASTATIN CALCIUM 20 MILLIGRAM(S): 80 TABLET, FILM COATED ORAL at 21:45

## 2023-01-01 RX ADMIN — Medication 3.56 MICROGRAM(S)/KG/MIN: at 07:50

## 2023-01-01 RX ADMIN — Medication 100 MEQ/KG/HR: at 07:21

## 2023-01-01 RX ADMIN — Medication 2: at 00:27

## 2023-01-01 RX ADMIN — ATORVASTATIN CALCIUM 20 MILLIGRAM(S): 80 TABLET, FILM COATED ORAL at 23:08

## 2023-01-01 RX ADMIN — PANTOPRAZOLE SODIUM 40 MILLIGRAM(S): 20 TABLET, DELAYED RELEASE ORAL at 05:22

## 2023-01-01 RX ADMIN — Medication 650 MILLIGRAM(S): at 09:17

## 2023-01-01 RX ADMIN — MEROPENEM 100 MILLIGRAM(S): 1 INJECTION INTRAVENOUS at 23:08

## 2023-01-01 RX ADMIN — Medication 3.56 MICROGRAM(S)/KG/MIN: at 08:27

## 2023-01-01 RX ADMIN — CHLORHEXIDINE GLUCONATE 1 APPLICATION(S): 213 SOLUTION TOPICAL at 18:09

## 2023-01-01 RX ADMIN — Medication 3.56 MICROGRAM(S)/KG/MIN: at 20:12

## 2023-01-01 RX ADMIN — HEPARIN SODIUM 5000 UNIT(S): 5000 INJECTION INTRAVENOUS; SUBCUTANEOUS at 06:50

## 2023-01-01 RX ADMIN — PHENYLEPHRINE HYDROCHLORIDE 2.85 MICROGRAM(S)/KG/MIN: 10 INJECTION INTRAVENOUS at 19:41

## 2023-01-01 RX ADMIN — HUMAN INSULIN 5 UNIT(S): 100 INJECTION, SUSPENSION SUBCUTANEOUS at 13:44

## 2023-01-01 RX ADMIN — HUMAN INSULIN 5 UNIT(S): 100 INJECTION, SUSPENSION SUBCUTANEOUS at 23:50

## 2023-01-01 RX ADMIN — Medication 650 MILLIGRAM(S): at 11:14

## 2023-01-01 RX ADMIN — Medication 3.56 MICROGRAM(S)/KG/MIN: at 09:44

## 2023-01-01 RX ADMIN — HUMAN INSULIN 5 UNIT(S): 100 INJECTION, SUSPENSION SUBCUTANEOUS at 17:41

## 2023-01-01 RX ADMIN — PHENYLEPHRINE HYDROCHLORIDE 2.85 MICROGRAM(S)/KG/MIN: 10 INJECTION INTRAVENOUS at 07:14

## 2023-01-01 RX ADMIN — MEROPENEM 100 MILLIGRAM(S): 1 INJECTION INTRAVENOUS at 09:17

## 2023-01-01 RX ADMIN — Medication 6: at 13:16

## 2023-01-01 RX ADMIN — Medication 500 MILLIGRAM(S): at 10:43

## 2023-01-01 RX ADMIN — BUDESONIDE AND FORMOTEROL FUMARATE DIHYDRATE 2 PUFF(S): 160; 4.5 AEROSOL RESPIRATORY (INHALATION) at 09:24

## 2023-01-01 RX ADMIN — Medication 200 GRAM(S): at 12:24

## 2023-01-01 RX ADMIN — Medication 100 GRAM(S): at 06:05

## 2023-01-01 RX ADMIN — BUDESONIDE AND FORMOTEROL FUMARATE DIHYDRATE 2 PUFF(S): 160; 4.5 AEROSOL RESPIRATORY (INHALATION) at 09:58

## 2023-01-01 RX ADMIN — SODIUM CHLORIDE 75 MILLILITER(S): 9 INJECTION INTRAMUSCULAR; INTRAVENOUS; SUBCUTANEOUS at 10:00

## 2023-01-01 RX ADMIN — Medication 100 MILLIEQUIVALENT(S): at 03:45

## 2023-01-01 RX ADMIN — PROPOFOL 30 MICROGRAM(S)/KG/MIN: 10 INJECTION, EMULSION INTRAVENOUS at 07:47

## 2023-01-01 RX ADMIN — MEROPENEM 100 MILLIGRAM(S): 1 INJECTION INTRAVENOUS at 22:06

## 2023-01-01 RX ADMIN — Medication 6: at 22:18

## 2023-01-01 RX ADMIN — Medication 4: at 11:39

## 2023-01-01 RX ADMIN — PHENYLEPHRINE HYDROCHLORIDE 2.85 MICROGRAM(S)/KG/MIN: 10 INJECTION INTRAVENOUS at 07:44

## 2023-01-01 RX ADMIN — CHLORHEXIDINE GLUCONATE 1 APPLICATION(S): 213 SOLUTION TOPICAL at 14:13

## 2023-01-01 RX ADMIN — HUMAN INSULIN 7 UNIT(S): 100 INJECTION, SUSPENSION SUBCUTANEOUS at 13:45

## 2023-01-01 RX ADMIN — Medication 100 MILLIEQUIVALENT(S): at 21:46

## 2023-01-01 RX ADMIN — PROPOFOL 30 MICROGRAM(S)/KG/MIN: 10 INJECTION, EMULSION INTRAVENOUS at 07:21

## 2023-01-13 NOTE — CONSULT NOTE ADULT - ASSESSMENT
82M DM, CAD, CHF, Dementia, CKD  bibems accompanied by daughter after a fall this evening. Nephrology consulted for acute on ckd    Acute on CKD stage 4  baseline ~ 2.1 12/2022  CHIARA possible ATN due to covid vs prerenal  s/p 1L in ED  check urine cr, na  check renal us  monitor bmp  avoid nephrotoxic agents    proteinuria  mild  check urine p/c ratio    acidosis  lactic acidosis  improving  monitor    covid/pna  care per team     82M DM, CAD, CHF, Dementia, CKD  bibems accompanied by daughter after a fall this evening. Nephrology consulted for acute on ckd    CHIARA on CKD stage 4  baseline ~ 2.1 12/2022  CHIARA possible ATN due to covid vs prerenal  s/p 1L in ED  check urine cr, na  check renal us  monitor bmp  avoid nephrotoxic agents    proteinuria  mild  check urine p/c ratio    acidosis  lactic acidosis  improving  monitor    covid/pna  care per team

## 2023-01-13 NOTE — ED PROVIDER NOTE - OBJECTIVE STATEMENT
82M bibems accompanied by daughter after a fall this evening. Patient is unable to give history 2/2 dementia. Daughter lists pmh of dm, cad, chf, dementia. Daughter states that yesterday prior to the fall the patient was noted to have diarrheal incontinence, was slower to respond, and had decreased appetite compared to baseline. Patient also was noted to test positive on a home covid test. The next evening (tonight) patient's wife was attempting to help him out of bed when he slid out of bed onto the ground and was unable to be caught - this fall was witnessed, had no head trauma, ems was called. Patient is not anticoagulated.

## 2023-01-13 NOTE — H&P ADULT - PROBLEM SELECTOR PLAN 3
in setting of COVID, pneumonia   currently requiring 4L NC   CXR showing large left sided opacity   checking D-dimer in setting of COVID, but likelihood of PE low at this time   continuous pulse oximetry  tirate O2 to SPO2 of 92-96%

## 2023-01-13 NOTE — ED ADULT NURSE NOTE - CHIEF COMPLAINT QUOTE
As per EMT" Wife called at 2:30am was attempting to feed him soup trying to sit him up unable to stand. [id number], Tested positive covid  . New diagnosised Dementia wife reports at baseline more talkative, at scene room air pox = 89 placed on 4 lc." Pt awake alert, [facility],  Contact # WIfe # [phone] Theresarochelle Ag [phone] cell # [phone]

## 2023-01-13 NOTE — ED ADULT NURSE NOTE - OBJECTIVE STATEMENT
pt received in room 23. pt is AxOx4 and ambulatory at baseline. pt brought in by EMS from home. pt unable to provide history. As per daughter, pt slid out of bed this morning and could not get up, pt did not hit head or LOC. Pt is not on anticoags. pt has PMH of DM, HTN and dementia. pt has increased loss of appetite and weakness. pt tested positive for covid with at home test. pt has no signs of trauma, pt RR are even and unlabored. pt has 22g in RAC, labs drawn and sent and pt medicated with fluids as ordered. pt daughter at bedside, Call bell within reach. Will continue to monitor.

## 2023-01-13 NOTE — ED PROVIDER NOTE - NSICDXPASTMEDICALHX_GEN_ALL_CORE_FT
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    Glaucoma     HLD (hyperlipidemia)     [place] (hard of hearing)

## 2023-01-13 NOTE — CONSULT NOTE ADULT - SUBJECTIVE AND OBJECTIVE BOX
[facility] [place] [facility]   MD CATRINA ZUÑIGA MD KRISTINE SOLTANPOUR, DO ANGELA WONG, PA        TEL:  OFFICE: [phone]  From 5pm-7am answering service [phone]    --- INITIAL RENAL CONSULT NOTE ---date of service 23 @ 16:06    HPI:  82M bibems accompanied by daughter after a fall this evening. Patient is unable to give history 2/2 dementia. Daughter lists pmh of dm, cad, chf, dementia. Daughter states that yesterday prior to the fall the patient was noted to have diarrheal incontinence, was slower to respond, and had decreased appetite compared to baseline. Patient also was noted to test positive on a home covid test. The next evening (tonight) patient's wife was attempting to help him out of bed when he slid out of bed onto the ground and was unable to be caught - this fall was witnessed, had no head trauma, ems was called. pt follow up with dr pickering for ckd, baseline ~ 2.1      Allergies:  No Known Allergies      PAST MEDICAL & SURGICAL HISTORY:  DM (diabetes mellitus)      [place] (hard of hearing)      Endophthalmitis  s/p DSAEK of the left eye ?      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      Hand fracture, left  s/p ORIF          Home Medications Reviewed    Hospital Medications:   MEDICATIONS  (STANDING):      SOCIAL HISTORY:  Denies ETOh, Smoking,     FAMILY HISTORY:      REVIEW OF SYSTEMS:  pt unable to provide    VITALS:  T(F): 98.8 (23 @ 14:58), Max: 100.9 (23 @ 12:13)  HR: 85 (23 @ 14:58)  BP: 100/37 (23 @ 14:58)  RR: 30 (23 @ 14:58)  SpO2: 100% (23 @ 14:58)  Wt(kg): --        PHYSICAL EXAM:  General: refused, covering himself with blanket     LABS:      136  |  98  |  25<H>  ----------------------------<  229<H>  4.0   |  21<L>  |  2.86<H>    Ca    8.6      2023 05:16    TPro  7.0  /  Alb  3.8  /  TBili  0.5  /  DBili      /  AST  59<H>  /  ALT  12  /  AlkPhos  56  01-13    Creatinine Trend: 2.86 <--                        10.5   12.91 )-----------( 110      ( 2023 05:16 )             34.4     Urine Studies:  Urinalysis Basic - ( 2023 05:34 )    Color: Yellow / Appearance: Slightly Turbid / S.015 / pH:   Gluc:  / Ketone: Negative  / Bili: Negative / Urobili: <2 mg/dL   Blood:  / Protein: 30 mg/dL / Nitrite: Negative   Leuk Esterase: Negative / RBC: 2 /HPF / WBC 1 /HPF   Sq Epi:  / Non Sq Epi: 2 /HPF / Bacteria: Negative          RADIOLOGY & ADDITIONAL STUDIES:                 [facility] [place] [facility]   MD CATRINA ZUÑIGA MD KRISTINE SOLTANPOUR, DO ANGELA WONG, PA        TEL:  OFFICE: [phone]  From 5pm-7am answering service [phone]    --- INITIAL RENAL CONSULT NOTE ---date of service 23 @ 16:06    HPI:  82M bibems accompanied by daughter after a fall this evening. Patient is unable to give history 2/2 dementia. Daughter lists pmh of dm, cad, chf, dementia. Daughter states that yesterday prior to the fall the patient was noted to have diarrheal incontinence, was slower to respond, and had decreased appetite compared to baseline. Patient also was noted to test positive on a home covid test. The next evening (tonight) patient's wife was attempting to help him out of bed when he slid out of bed onto the ground and was unable to be caught - this fall was witnessed, had no head trauma, ems was called. pt follow up with dr pickering for ckd, baseline ~ 2.1      Allergies:  No Known Allergies      PAST MEDICAL & SURGICAL HISTORY:  DM (diabetes mellitus)      [place] (hard of hearing)      Endophthalmitis  s/p DSAEK of the left eye ?      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      Hand fracture, left  s/p ORIF        Home Medications:  AMLODIPINE BESYLATE 5 MG TAB: TAKE 1 TABLET BY MOUTH EVERY DAY (2023 17:21)  ATORVASTATIN 20 MG TABLET: TAKE 1 TABLET BY MOUTH EVERY DAY (2023 17:21)  Calcium 600+D oral tablet: 1 tab(s) orally once a day (2023 17:21)  CARVEDILOL 12.5 MG TABLET: TAKE 1 TABLET BY MOUTH TWICE A DAY (2023 17:21)  DONEPEZIL HCL ODT 5 MG TABLET: PLACE 1 TABLET (5 MG) ON THE TONGUE AND ALLOW TO DISSOLVE DAILY AT BEDTIME (2023 17:21)  ENTRESTO 49 MG-51 MG TABLET: TAKE 1 TABLET BY MOUTH TWICE A DAY (2023 17:21)  FARXIGA 10 MG TABLET: TAKE 1 TABLET (10 MG) BY MOUTH DAILY. (2023 17:21)  potassium chloride 10 mEq oral capsule, extended release: 1 cap(s) orally once a day (2023 17:21)  TORSEMIDE 5 MG TABLET: TAKE 1 TABLET BY MOUTH EVERY DAY (2023 17:21)    Home Medications Reviewed    Hospital Medications:   MEDICATIONS  (STANDING):      SOCIAL HISTORY:  Denies ETOh, Smoking,     FAMILY HISTORY:      REVIEW OF SYSTEMS:  pt unable to provide    VITALS:  T(F): 98.8 (23 @ 14:58), Max: 100.9 (23 @ 12:13)  HR: 85 (23 @ 14:58)  BP: 100/37 (23 @ 14:58)  RR: 30 (23 @ 14:58)  SpO2: 100% (23 @ 14:58)  Wt(kg): --        PHYSICAL EXAM:  General: refused, covering himself with blanket     LABS:      136  |  98  |  25<H>  ----------------------------<  229<H>  4.0   |  21<L>  |  2.86<H>    Ca    8.6      2023 05:16    TPro  7.0  /  Alb  3.8  /  TBili  0.5  /  DBili      /  AST  59<H>  /  ALT  12  /  AlkPhos  56      Creatinine Trend: 2.86 <--                        10.5   12.91 )-----------( 110      ( 2023 05:16 )             34.4     Urine Studies:  Urinalysis Basic - ( 2023 05:34 )    Color: Yellow / Appearance: Slightly Turbid / S.015 / pH:   Gluc:  / Ketone: Negative  / Bili: Negative / Urobili: <2 mg/dL   Blood:  / Protein: 30 mg/dL / Nitrite: Negative   Leuk Esterase: Negative / RBC: 2 /HPF / WBC 1 /HPF   Sq Epi:  / Non Sq Epi: 2 /HPF / Bacteria: Negative          RADIOLOGY & ADDITIONAL STUDIES:    ACC: [id number] EXAM:  CT BRAIN                          PROCEDURE DATE:  2023          INTERPRETATION:  Clinical Indication: Altered mental status with fall    5mm axial sections of the brain were obtained from base to vertex,   without the intravenous administration of contrast material.      Comparison is made with the prior CT of 2018.    The examination is limited by patient positioning with the patient in the   right lateral decubitus position and subsequent out of field artifact.    The ventricles and sulci are prominent consistent age appropriate   involutional changes. Small vessel white matter ischemic changes are   noted. There is no hemorrhage, mass, or shift of midline structures. Bone   window examination is unremarkable. There has been previous bilateral   lens replacement surgery. There is a left-sided implanted glaucoma pump.    IMPRESSION: Age-appropriate involutional and ischemic gliotic changes. No   hemorrhage. No significant change since 2018. Limited by positioning.    --- End of Report ---            ANGELINE LIN MD; Attending Radiologist  This document has been electronically signed. 2023  8:21PM

## 2023-01-13 NOTE — H&P ADULT - NSICDXPASTMEDICALHX_GEN_ALL_CORE_FT
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    Glaucoma     HLD (hyperlipidemia)     [place] (hard of hearing)

## 2023-01-13 NOTE — H&P ADULT - PROBLEM SELECTOR PLAN 9
BP low on admission  holding home carvedilol, Entresto, amlodipine in setting of sepsis  continue to monitor BP

## 2023-01-13 NOTE — H&P ADULT - PROBLEM SELECTOR PLAN 6
No TTE on file   family unsure name of cardiologist, PCP is Dr. Kennedy Godwin  TTE pedning   holding home entresto, and torsemide in setting of CHIARA  holding carvedilol as patient NPO

## 2023-01-13 NOTE — PATIENT PROFILE ADULT - FALL HARM RISK - HARM RISK INTERVENTIONS
Assistance with ambulation/Assistance OOB with selected safe patient handling equipment/Communicate Risk of Fall with Harm to all staff/Discuss with provider need for PT consult/Monitor for mental status changes/Monitor gait and stability/Move patient closer to nurses' station/Reinforce activity limits and safety measures with patient and family/Reorient to person, place and time as needed/Tailored Fall Risk Interventions/Toileting schedule using arm’s reach rule for commode and bathroom/Use of alarms - bed, chair and/or voice tab/Visual Cue: Yellow wristband and red socks/Bed in lowest position, wheels locked, appropriate side rails in place/Call bell, personal items and telephone in reach/Instruct patient to call for assistance before getting out of bed or chair/Non-slip footwear when patient is out of bed/[place] to call system/Physically safe environment - no spills, clutter or unnecessary equipment/Purposeful Proactive Rounding/Room/bathroom lighting operational, light cord in reach

## 2023-01-13 NOTE — CONSULT NOTE ADULT - NS ATTEND AMEND GEN_ALL_CORE FT
No pain, no shortness of breath    Review of systems: All 10 points ROS was obtained except as above.     acetaminophen     Tablet .. 650 milliGRAM(s) Oral every 6 hours PRN  dexAMETHasone  Injectable 6 milliGRAM(s) IV Push daily  dextrose 5%. 1000 milliLiter(s) IV Continuous <Continuous>  dextrose 5%. 1000 milliLiter(s) IV Continuous <Continuous>  dextrose 50% Injectable 25 Gram(s) IV Push once  dextrose 50% Injectable 12.5 Gram(s) IV Push once  dextrose 50% Injectable 25 Gram(s) IV Push once  dextrose Oral Gel 15 Gram(s) Oral once PRN  glucagon  Injectable 1 milliGRAM(s) IntraMuscular once  heparin   Injectable 5000 Unit(s) SubCutaneous every 8 hours  insulin lispro (ADMELOG) corrective regimen sliding scale   SubCutaneous every 6 hours  piperacillin/tazobactam IVPB.. 3.375 Gram(s) IV Intermittent every 12 hours      VITAL:  T(C): , Max: 38.3 (23 @ 12:13)  T(F): , Max: 100.9 (23 @ 12:13)  HR: 104 (23 @ 21:15)  BP: 102/61 (23 @ 21:15)  BP(mean): --  RR: 34 (23 @ 21:15)  SpO2: 98% (23 @ 21:15)  Wt(kg): --      PHYSICAL EXAM:  General: NAD; Alert  HEENT:  NCAT; PERRLA  Neck: No JVD; supple  Respiratory: CTA-b/l  Cardiac: RRR s1s2  Gastrointestinal: BS+, soft, NT/ND  Urologic: No kaufman  Extremities: No peripheral edema  Access:     LABS:                          10.5   12.91 )-----------( 110      ( 2023 05:16 )             34.4     Na(136)/K(4.0)/Cl(98)/HCO3(21)/BUN(25)/Cr(2.86)Glu(229)/Ca(8.6)/Mg(--)/PO4(--)     @ 05:16    Urinalysis Basic - ( 2023 05:34 )    Color: Yellow / Appearance: Slightly Turbid / S.015 / pH: x  Gluc: x / Ketone: Negative  / Bili: Negative / Urobili: <2 mg/dL   Blood: x / Protein: 30 mg/dL / Nitrite: Negative   Leuk Esterase: Negative / RBC: 2 /HPF / WBC 1 /HPF   Sq Epi: x / Non Sq Epi: 2 /HPF / Bacteria: Negative      Creatinine, Random Urine: 189 mg/dL ( @ 18:41)  Sodium, Random Urine: 31 mmol/L ( @ 18:41)  Creatinine, Random Urine: 189 mg/dL ( @ 18:41)  Protein/Creatinine Ratio Calculation: 0.2 Ratio ( @ 18:41)        ASSESSMENT/PLAN  Hold Entresto, Farxiga and torsemide  pt was on it. Need to be held. This could be all iatrogenic. No pain, no shortness of breath    Review of systems: All 10 points ROS was obtained except as above.     acetaminophen     Tablet .. 650 milliGRAM(s) Oral every 6 hours PRN  dexAMETHasone  Injectable 6 milliGRAM(s) IV Push daily  dextrose 5%. 1000 milliLiter(s) IV Continuous <Continuous>  dextrose 5%. 1000 milliLiter(s) IV Continuous <Continuous>  dextrose 50% Injectable 25 Gram(s) IV Push once  dextrose 50% Injectable 12.5 Gram(s) IV Push once  dextrose 50% Injectable 25 Gram(s) IV Push once  dextrose Oral Gel 15 Gram(s) Oral once PRN  glucagon  Injectable 1 milliGRAM(s) IntraMuscular once  heparin   Injectable 5000 Unit(s) SubCutaneous every 8 hours  insulin lispro (ADMELOG) corrective regimen sliding scale   SubCutaneous every 6 hours  piperacillin/tazobactam IVPB.. 3.375 Gram(s) IV Intermittent every 12 hours      VITAL:  T(C): , Max: 38.3 (23 @ 12:13)  T(F): , Max: 100.9 (23 @ 12:13)  HR: 104 (23 @ 21:15)  BP: 102/61 (23 @ 21:15)  BP(mean): --  RR: 34 (23 @ 21:15)  SpO2: 98% (23 @ 21:15)  Wt(kg): --      PHYSICAL EXAM:  General: NAD; Alert  HEENT:  NCAT; PERRLA  Neck: No JVD; supple  Respiratory: CTA-b/l  Cardiac: RRR s1s2  Gastrointestinal: BS+, soft, NT/ND  Urologic: No kaufman  Extremities: No peripheral edema  Access:     LABS:                          10.5   12.91 )-----------( 110      ( 2023 05:16 )             34.4     Na(136)/K(4.0)/Cl(98)/HCO3(21)/BUN(25)/Cr(2.86)Glu(229)/Ca(8.6)/Mg(--)/PO4(--)     @ 05:16    Urinalysis Basic - ( 2023 05:34 )    Color: Yellow / Appearance: Slightly Turbid / S.015 / pH: x  Gluc: x / Ketone: Negative  / Bili: Negative / Urobili: <2 mg/dL   Blood: x / Protein: 30 mg/dL / Nitrite: Negative   Leuk Esterase: Negative / RBC: 2 /HPF / WBC 1 /HPF   Sq Epi: x / Non Sq Epi: 2 /HPF / Bacteria: Negative      Creatinine, Random Urine: 189 mg/dL ( @ 18:41)  Sodium, Random Urine: 31 mmol/L ( @ 18:41)  Creatinine, Random Urine: 189 mg/dL ( @ 18:41)  Protein/Creatinine Ratio Calculation: 0.2 Ratio ( @ 18:41)        ASSESSMENT/PLAN  Hold Entresto, Farxiga and torsemide  pt was on it. Need to be held. This could be all iatrogenic.  Also has high anion gap metabolic acidosis with alctic acidosis.   need to calculate FeUREA not sodium as pt was on torsemide.

## 2023-01-13 NOTE — H&P ADULT - NSHPPHYSICALEXAM_GEN_ALL_CORE
PHYSICAL EXAM:  Vital Signs Last 24 Hrs  T(C): 37.1 (01-13-23 @ 14:58)  T(F): 98.8 (01-13-23 @ 14:58), Max: 100.9 (01-13-23 @ 12:13)  HR: 90 (01-13-23 @ 16:30) (51 - 95)  BP: 127/48 (01-13-23 @ 16:30)  BP(mean): --  RR: 28 (01-13-23 @ 16:30) (22 - 32)  SpO2: 100% (01-13-23 @ 16:30) (98% - 100%)  Wt(kg): --    General: NAD, awake and alert  Eyes: PERRLA, nonicteric sclera  HENMT: NCAT, MMM, nares patent, no tonsillar exudates  Neck: Supple, no thyromegaly, trachea midline   Respiratory: No respiratory distress, CTABL, No rales, rhonchi, wheezing.  Cardiovascular: Regular rate and rhythm; No m/g/r. 2+ peripheral pulses  Gastrointestinal: Soft, Nontender, Nondistended; +BS. No palpable organomegaly  Extremities: No c/c/e; warm to touch  Neurological: lethargic, responsive to noxious stimuli,  unable to follow commands  Musculoskeletal: Normal ROM, no joint swelling.  Skin: No rashes, No erythema   Psych: AAO0, PHYSICAL EXAM:  Vital Signs Last 24 Hrs  T(C): 37.1 (01-13-23 @ 14:58)  T(F): 98.8 (01-13-23 @ 14:58), Max: 100.9 (01-13-23 @ 12:13)  HR: 90 (01-13-23 @ 16:30) (51 - 95)  BP: 127/48 (01-13-23 @ 16:30)  BP(mean): --  RR: 28 (01-13-23 @ 16:30) (22 - 32)  SpO2: 100% (01-13-23 @ 16:30) (98% - 100%)  Wt(kg): --    General: NAD, awake and alert  Eyes: PERRLA, nonicteric sclera  HENMT: NCAT, MMM, nares patent, no tonsillar exudates  Neck: Supple, no thyromegaly, trachea midline   Respiratory: No respiratory distress, shallow breaths, diffuse corse bilateral rhonchi  Cardiovascular: Regular rate and rhythm; No m/g/r. 2+ peripheral pulses  Gastrointestinal: Soft, Nontender, Nondistended; +BS. No palpable organomegaly  Extremities: No c/c/e; warm to touch  Neurological: lethargic, responsive to noxious stimuli,  unable to follow commands  Musculoskeletal: Normal ROM, no joint swelling.  Skin: No rashes, No erythema   Psych: AAO0,

## 2023-01-13 NOTE — ED ADULT NURSE NOTE - NSIMPLEMENTINTERV_GEN_ALL_ED
Implemented All Fall Risk Interventions:  [place] to call system. Call bell, personal items and telephone within reach. Instruct patient to call for assistance. Room bathroom lighting operational. Non-slip footwear when patient is off stretcher. Physically safe environment: no spills, clutter or unnecessary equipment. Stretcher in lowest position, wheels locked, appropriate side rails in place. Provide visual cue, wrist band, yellow gown, etc. Monitor gait and stability. Monitor for mental status changes and reorient to person, place, and time. Review medications for side effects contributing to fall risk. Reinforce activity limits and safety measures with patient and family.

## 2023-01-13 NOTE — H&P ADULT - ASSESSMENT
Yohan Hayes is an 82M with PMH significant for but not limited to DM2, HLD, Alzheimer's dementia (AAO2-3 at baseline) who is presenting from home with COVID and altered mental status.

## 2023-01-13 NOTE — ED PROVIDER NOTE - CLINICAL SUMMARY MEDICAL DECISION MAKING FREE TEXT BOX
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 Pt likely weakened from acute Covid infection. Pt also found hypoix to 86% on room air with EMS, improved with 2L NC. Will get CXR to r/o superimposed bacterial process. Check cbc, cmp, trop, cultures, UA/UC. Low suspicion for injury from fall, can check pelvic XRay. 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 Pt likely weakened from acute Covid infection. Pt also found hypoix to 86% on room air with EMS, improved with 2L NC. Will get CXR to r/o superimposed bacterial process. Check cbc, cmp, trop, cultures, VBG with lactate, UA/UC. Low suspicion for injury from fall, can check pelvic XRay. No concern for head trauma, no indication for CTH

## 2023-01-13 NOTE — ED PROVIDER NOTE - PROGRESS NOTE DETAILS
Dr. Matamoros: Pt was signed out to me awaiting CXR with plans for admission for FTT with COVID + at home. Pt currently resting and has dementia with A&O X 1. Daughter at bedside and discussed with her. Dr. Matamoros: Given pt's increased Cr with dementia, not eating, and findings on CXR, discussed with daughter about admission which she agreed with for management.

## 2023-01-13 NOTE — H&P ADULT - PROBLEM SELECTOR PLAN 5
Cr 2.8, unknown baseline was 1.1 in 2018; possibly prerenal in setting of poor PO intake   UA without evidence of infection, trace protein  will send for urine studies   renal/bladder US  monitor I/Os # CHIARA on CKD  Cr 2.8, baseline was 2.1 in 12/2022 per Nephrology   UA without evidence of infection, trace protein  will send for urine studies   renal/bladder US  monitor I/Os

## 2023-01-13 NOTE — H&P ADULT - HISTORY OF PRESENT ILLNESS
Yohan Hayes is an 82M with PMH significant for but not limited to HTN, DM2, HLD, Alzheimer's dementia (AAO2-3 at baseline) who is presenting from home with COVID and altered mental status. Patient is unable to provide history to due to illness and dementia collateral was obtained from daughter Leena. Per family patient started to develop cough 2 days ago, per daughter they performed a home COVID test which was positive (1/11). Over the next several days patient was becoming less alert, however not much was made of it as his mental status does fluctuate at times. Yesterday patient had an episode of fecal incontinence which is atypical (has history of urinary incontinence and wears depends). Last night patient was increasingly difficult to arouse. His wife tried to wake him up and sat him up in the bed, however patient slipped out of the bed. Per family he did not fall and was slowly lower to the fall and he did not hit his head.

## 2023-01-13 NOTE — H&P ADULT - NSHPLABSRESULTS_GEN_ALL_CORE
LABS:                        10.5   12.91 )-----------( 110      ( 13 Jan 2023 05:16 )             34.4     13 Jan 2023 05:16    136    |  98     |  25     ----------------------------<  229    4.0     |  21     |  2.86 ./    Ca    8.6        13 Jan 2023 05:16    TPro  7.0    /  Alb  3.8    /  TBili  0.5    /  DBili  x      /  AST  59     /  ALT  12     /  AlkPhos  56     13 Jan 2023 05:16    I reviewed the labs and imaging. I independently reviewed the EKG which showed RBBB with frequent PVCs, no acute ischemic changes

## 2023-01-13 NOTE — H&P ADULT - PROBLEM SELECTOR PLAN 1
# Sepsis secondary to COVID   PCR positive here 1/13, home test positive on 1/11   prescribed paxlovid as OP, but did not receive any doses   holding off Remdesivir in setting of kidney failure  c/w dexamethasone 6mg for now, day 1/10

## 2023-01-13 NOTE — H&P ADULT - PROBLEM SELECTOR PLAN 4
Toxic metabolic encephalopathy in setting of Alzheimers   continue to treat infection as above   on Aricept at home, holding while NPO Toxic metabolic encephalopathy in setting of Alzheimer  CTH ordered   continue to treat infection as above   on Aricept at home, holding while NPO

## 2023-01-13 NOTE — H&P ADULT - PROBLEM SELECTOR PLAN 8
A1C pending   glucose here has been >200  on Farxiga at home, unclear if for CHF vs DM   c/w ISSq6h while NPO

## 2023-01-13 NOTE — ED PROVIDER NOTE - PHYSICAL EXAMINATION
PHYSICAL EXAM:  GENERAL: NAD, aox0  HEAD: Atraumatic  EYES: Clear bilaterally, pupils equal, round and reactive to light.  ENMT: Airway patent, Nasal mucosa clear. Mouth with normal mucosa. Uvula is midline.   CARDIAC: Normal rate, no appreciable murmur  RESPIRATORY: Breathing unlabored. Breath sounds clear and equal bilaterally.  ABDOMEN:  Soft, nontender, nondistended. No rebound tenderness or guarding.  NEUROLOGICAL: aox0, moving all extremities   SKIN: Skin warm and dry. No evidence of rashes or lesions.
[FreeTextEntry1] : VIMPAT 200 BID\par DONEPEZIL 10 MG QHS\par LISINOPRIL \par ATORVASTATIN\par NAMENDA 5 MG\par \par \par \par KEPPRA WAS STOPPED. mOST OF THE SEIZURES OCCURRED AFTER BEING DISCONNECTED \par \par went to rehab \par \par now independent with adls but is observed when he oes out

## 2023-01-13 NOTE — ED PROVIDER NOTE - ATTENDING CONTRIBUTION TO CARE
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 Pt unable to provide history, obtained from EMS. States pt's wife attempted to feed soup at 230am today, and when sitting at edge of bed he had slid off bed falling onto buttock. Per daughter also with diarrhea, decreased appetite and slower to respond than baseline. Fall witnessed. No head trauma/LOC. Pt was unable to get back up prompting EMS call. At baseline pt able to perform most ADLs without assistance. Pt not on AC.  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 Pt likely weakened from acute Covid infection. Pt also found hypoix to 86% on room air with EMS, improved with 2L NC. Will get CXR to r/o superimposed bacterial process. Check cbc, cmp, trop, cultures, UA/UC. Low suspicion for injury from fall, can check pelvic XRay. 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 Pt unable to provide history, obtained from EMS. States pt's wife attempted to feed soup at 230am today, and when sitting at edge of bed he had slid off bed falling onto buttock. Per daughter also with diarrhea, decreased appetite and slower to respond than baseline. Fall witnessed. No head trauma/LOC. Pt was unable to get back up prompting EMS call. At baseline pt able to perform most ADLs without assistance. Pt not on AC.  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 Pt likely weakened from acute Covid infection. Pt also found hypoix to 86% on room air with EMS, improved with 2L NC. Will get CXR to r/o superimposed bacterial process. Check cbc, cmp, trop, VBG with lactate, cultures, UA/UC. Low suspicion for injury from fall, can check pelvic XRay. No concern for head trauma, no indication for CTH

## 2023-01-13 NOTE — H&P ADULT - PROBLEM SELECTOR PLAN 10
DVTppx: HSQ for now - may need dose adjustment pending BMP/dimer   Diet: NPO pending SLP  Code: Full Code   Dispo: PT consult once able

## 2023-01-13 NOTE — ED PROVIDER NOTE - NSICDXPASTSURGICALHX_GEN_ALL_CORE_FT
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

## 2023-01-13 NOTE — ED ADULT TRIAGE NOTE - CHIEF COMPLAINT QUOTE
As per EMT" Wife called at 2:30am was attempting to feed him soup trying to sit him up unable to stand. [id number], Tested positive covid  . New diagnosised Dementia wife reports at baseline more talkative, at scene room air pox = 89 placed on 4 lc."  Contact # WIfe # [phone] Anaya Ag [phone] cell # [phone] As per EMT" Wife called at 2:30am was attempting to feed him soup trying to sit him up unable to stand. [id number], Tested positive covid  . New diagnosised Dementia wife reports at baseline more talkative, at scene room air pox = 89 placed on 4 lc." Pt awake alert, [facility],  Contact # WIfe # [phone] Theresarochelle Ag [phone] cell # [phone]

## 2023-01-13 NOTE — ED ADULT NURSE NOTE - NSICDXPASTMEDICALHX_GEN_ALL_CORE_FT
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    Glaucoma     HLD (hyperlipidemia)     [place] (hard of hearing)

## 2023-01-14 NOTE — CHART NOTE - NSCHARTNOTEFT_GEN_A_CORE
: Dr. Preston, Dr. De La Torre    INDICATION: respiratory failure, shock    PROCEDURE:  [x] LIMITED ECHO  [x] LIMITED CHEST  [ ] LIMITED RETROPERITONEAL  [ ] LIMITED ABDOMINAL  [ ] LIMITED DVT  [ ] NEEDLE GUIDANCE VASCULAR  [ ] NEEDLE GUIDANCE THORACENTESIS  [ ] NEEDLE GUIDANCE PARACENTESIS  [ ] NEEDLE GUIDANCE PERICARDIOCENTESIS  [ ] OTHER    FINDINGS:  predominantly a lines anteriorly, dense RLL consolidation, left without pleural effusion, increased LV free wall thickness, RV< LV, IVC indeterminate    INTERPRETATION:  RLL PNA, unlikely cardiogenic shock      Images uploaded to [employer] : Dr. Britt Duque    INDICATION: respiratory failure, shock    PROCEDURE:  [x] LIMITED ECHO  [x] LIMITED CHEST  [ ] LIMITED RETROPERITONEAL  [ ] LIMITED ABDOMINAL  [ ] LIMITED DVT  [ ] NEEDLE GUIDANCE VASCULAR  [ ] NEEDLE GUIDANCE THORACENTESIS  [ ] NEEDLE GUIDANCE PARACENTESIS  [ ] NEEDLE GUIDANCE PERICARDIOCENTESIS  [ ] OTHER    FINDINGS:  predominantly a lines anteriorly, dense RLL consolidation, left without pleural effusion, increased LV free wall thickness, RV< LV, IVC indeterminate    INTERPRETATION:  RLL PNA, unlikely cardiogenic shock      Images uploaded to [employer]    Attending note :  I was present for the duration of the procedure and supervised as needed.

## 2023-01-14 NOTE — PROGRESS NOTE ADULT - SUBJECTIVE AND OBJECTIVE BOX
[facility] [place] [facility]   MD CATRINA ZUÑIGA MD KRISTINE SOLTANPOUR, DO ANGELA WONG, PA    TEL:  OFFICE: [phone]  From 5pm-7am Answering Service [phone]    -- RENAL FOLLOW UP NOTE ---Date of Service 01-14-23 @ 14:23    Patient is a 82y old  Male who presents with a chief complaint of COVID (14 Jan 2023 07:24)      Patient seen and examined at bedside. pt unresponsive despite aggressive sternum rub. team call, RRT called    VITALS:  T(F): 99.2 (01-14-23 @ 12:45), Max: 99.2 (01-14-23 @ 12:45)  HR: 116 (01-14-23 @ 13:00)  BP: 99/69 (01-14-23 @ 13:00)  RR: 16 (01-14-23 @ 13:00)  SpO2: 100% (01-14-23 @ 12:56)  Wt(kg): --    01-14 @ 07:01  -  01-14 @ 14:23  --------------------------------------------------------  IN: 350 mL / OUT: 0 mL / NET: 350 mL        Weight (kg): 76 (01-14 @ 06:02)    PHYSICAL EXAM:  General: obtundent   Neck: No JVD  Respiratory: +rhonchi  Cardiovascular: S1, S2, RRR  Gastrointestinal: BS+, soft, NT/ND  Extremities: No peripheral edema    Hospital Medications:   MEDICATIONS  (STANDING):  albumin human  5% IVPB 250 milliLiter(s) IV Intermittent once  azithromycin  IVPB      chlorhexidine 4% Liquid 1 Application(s) Topical <User Schedule>  dexAMETHasone  Injectable 6 milliGRAM(s) IV Push daily  dextrose 5%. 1000 milliLiter(s) (100 mL/Hr) IV Continuous <Continuous>  dextrose 5%. 1000 milliLiter(s) (50 mL/Hr) IV Continuous <Continuous>  dextrose 50% Injectable 25 Gram(s) IV Push once  dextrose 50% Injectable 12.5 Gram(s) IV Push once  dextrose 50% Injectable 25 Gram(s) IV Push once  glucagon  Injectable 1 milliGRAM(s) IntraMuscular once  heparin   Injectable 5000 Unit(s) SubCutaneous every 8 hours  insulin lispro (ADMELOG) corrective regimen sliding scale   SubCutaneous every 6 hours  lactated ringers. 1000 milliLiter(s) (75 mL/Hr) IV Continuous <Continuous>  lactated ringers. 1000 milliLiter(s) (75 mL/Hr) IV Continuous <Continuous>  midodrine. 30 milliGRAM(s) Oral three times a day  norepinephrine Infusion 0.05 MICROgram(s)/kG/Min (7.13 mL/Hr) IV Continuous <Continuous>  piperacillin/tazobactam IVPB.. 3.375 Gram(s) IV Intermittent every 12 hours  propofol Infusion 65.789 MICROgram(s)/kG/Min (30 mL/Hr) IV Continuous <Continuous>  vancomycin  IVPB 1250 milliGRAM(s) IV Intermittent once      LABS:  01-14    137  |  98  |  40<H>  ----------------------------<  189<H>  3.5   |  19<L>  |  3.87<H>    Ca    8.0<L>      14 Jan 2023 04:45  Phos  7.1     01-14  Mg     1.60     01-14    TPro  7.0  /  Alb  3.5  /  TBili  0.8  /  DBili      /  AST  131<H>  /  ALT  21  /  AlkPhos  33<L>  01-14    Creatinine Trend: 3.87 <--, 2.86 <--    Albumin, Serum: 3.5 g/dL (01-14 @ 04:45)  Phosphorus Level, Serum: 7.1 mg/dL (01-14 @ 04:45)  Ferritin, Serum: 612 ng/mL (01-14 @ 04:45)                              11.8   6.68  )-----------( 73       ( 14 Jan 2023 04:45 )             38.2     Urine Studies:  Urinalysis - [01-13-23 @ 05:34]      Color Yellow / Appearance Slightly Turbid / SG 1.015 / pH 5.5      Gluc Negative / Ketone Negative  / Bili Negative / Urobili <2 mg/dL       Blood Negative / Protein 30 mg/dL / Leuk Est Negative / Nitrite Negative      RBC 2 / WBC 1 / Hyaline 6 / Gran  / Sq Epi  / Non Sq Epi 2 / Bacteria Negative    Urine Creatinine 189      [01-13-23 @ 18:41]  Urine Protein 30      [01-13-23 @ 18:41]  Urine Sodium 31      [01-13-23 @ 18:41]  Urine Urea Nitrogen 527.0      [01-13-23 @ 18:41]    Ferritin 612      [01-14-23 @ 04:45]  HbA1c 7.0      [05-26-18 @ 16:34]  TSH 0.70      [01-14-23 @ 04:45]  Lipid: chol 60, , HDL 36, LDL --      [01-14-23 @ 04:45]        RADIOLOGY & ADDITIONAL STUDIES:

## 2023-01-14 NOTE — PROVIDER CONTACT NOTE (OTHER) - ACTION/TREATMENT ORDERED:
Janneth Cano [phone] notified, will continue to trend. Stated LR fluids should be helping. Will endorse to day team.
Janneth Cano [id number] assessed patient, ordered for LR @ 75ml.
Will continue to monitor.

## 2023-01-14 NOTE — PROGRESS NOTE ADULT - PROBLEM SELECTOR PLAN 2
Concern for possible superimposed bacterial pneumonia versus aspiration pneumonia   CXR showing large left sided opacity   lactate 3.9 ->3.0 continue to trend q6h until normal   will check procalcitonin, inflammatory markers   unlikely able to produce sputum for culture   s/p 1L NS in the ED, would hold off on additional IVF at this time given appears slightly hypervolemic   monitor BCx  c/w Zosyn q12, renally dosed

## 2023-01-14 NOTE — PROVIDER CONTACT NOTE (CRITICAL VALUE NOTIFICATION) - ACTION/TREATMENT ORDERED:
MAURIZIO Mcclelland [phone] notified, stated LR fluids and IV abx should be helping patient. Will continue to trend lactate and notify primary team.

## 2023-01-14 NOTE — PROGRESS NOTE ADULT - SUBJECTIVE AND OBJECTIVE BOX
Internal Medicine Progress Note    Patient is a 82y old  Male who presents with a chief complaint of COVID (2023 17:01)    OVERNIGHT EVENTS/SUBJECTIVE:    OBJECTIVE:  Vital Signs Last 24 Hrs  T(C): 36.8 (2023 06:02), Max: 38.3 (2023 12:13)  T(F): 98.3 (2023 06:02), Max: 100.9 (2023 12:13)  HR: 67 (2023 06:02) (51 - 104)  BP: 127/48 (2023 06:02) (95/75 - 129/50)  BP(mean): --  RR: 19 (2023 06:02) (19 - 34)  SpO2: 95% (2023 06:02) (95% - 100%)    Parameters below as of 2023 06:02  Patient On (Oxygen Delivery Method): nasal cannula  O2 Flow (L/min): 4    I&O's Detail    Daily     Daily   Physical Exam:  General: NAD, resting comfortably in bed  Neuro: A&Ox4, 5/5 strength in all ext  HEENT: NC/AT, EOMI, normal hearing, oral mucosa moist, no oral lesions noted, no pharyngeal erythema, uvula midline  Neck: supple, thyroid not enlarged, no LAD  Resp: Breathing comfortably on RA, LCTA b/l  CV: Normal sinus rhythm, S1 and S2, no r/m/g  Abd: soft, non-distended, non-tender. No HSM.  Ext: ROMIx4, no edema, +2 pulses bilaterally  Skin: Warm and dry, no rashes or discolorations  Psych: Appropriate affect    Medications:  MEDICATIONS  (STANDING):  dexAMETHasone  Injectable 6 milliGRAM(s) IV Push daily  dextrose 5%. 1000 milliLiter(s) (100 mL/Hr) IV Continuous <Continuous>  dextrose 5%. 1000 milliLiter(s) (50 mL/Hr) IV Continuous <Continuous>  dextrose 50% Injectable 25 Gram(s) IV Push once  dextrose 50% Injectable 12.5 Gram(s) IV Push once  dextrose 50% Injectable 25 Gram(s) IV Push once  glucagon  Injectable 1 milliGRAM(s) IntraMuscular once  heparin   Injectable 5000 Unit(s) SubCutaneous every 8 hours  insulin lispro (ADMELOG) corrective regimen sliding scale   SubCutaneous every 6 hours  lactated ringers. 1000 milliLiter(s) (75 mL/Hr) IV Continuous <Continuous>  piperacillin/tazobactam IVPB.. 3.375 Gram(s) IV Intermittent every 12 hours    MEDICATIONS  (PRN):  acetaminophen     Tablet .. 650 milliGRAM(s) Oral every 6 hours PRN Temp greater or equal to 38C (100.4F), Mild Pain (1 - 3), Moderate Pain (4 - 6)  dextrose Oral Gel 15 Gram(s) Oral once PRN Blood Glucose LESS THAN 70 milliGRAM(s)/deciliter      Labs:                        11.8   6.68  )-----------( 73       ( 2023 04:45 )             38.2         137  |  98  |  40<H>  ----------------------------<  189<H>  3.5   |  19<L>  |  3.87<H>    Ca    8.0<L>      2023 04:45  Phos  7.1       Mg     1.60         TPro  7.0  /  Alb  3.5  /  TBili  0.8  /  DBili  x   /  AST  131<H>  /  ALT  21  /  AlkPhos  33<L>      PT/INR - ( 2023 04:45 )   PT: 16.4 sec;   INR: 1.41 ratio         PTT - ( 2023 04:45 )  PTT:55.5 sec  Urinalysis Basic - ( 2023 05:34 )    Color: Yellow / Appearance: Slightly Turbid / S.015 / pH: x  Gluc: x / Ketone: Negative  / Bili: Negative / Urobili: <2 mg/dL   Blood: x / Protein: 30 mg/dL / Nitrite: Negative   Leuk Esterase: Negative / RBC: 2 /HPF / WBC 1 /HPF   Sq Epi: x / Non Sq Epi: 2 /HPF / Bacteria: Negative          Radiology: Internal Medicine Progress Note    Patient is a 82y old  Male who presents with a chief complaint of COVID (2023 17:01)    OVERNIGHT EVENTS/SUBJECTIVE: Pt admitted with pneumonia. ROS unobtainable due to patient's current condition     OBJECTIVE:  Vital Signs Last 24 Hrs  T(C): 36.8 (2023 06:02), Max: 38.3 (2023 12:13)  T(F): 98.3 (2023 06:02), Max: 100.9 (2023 12:13)  HR: 67 (2023 06:02) (51 - 104)  BP: 127/48 (2023 06:02) (95/75 - 129/50)  BP(mean): --  RR: 19 (2023 06:02) (19 - 34)  SpO2: 95% (2023 06:02) (95% - 100%)    Parameters below as of 2023 06:02  Patient On (Oxygen Delivery Method): nasal cannula  O2 Flow (L/min): 4    I&O's Detail    Daily     Daily   Physical Exam:  General: NAD, resting comfortably in bed  Neuro: A&Ox0, only responsive to pain, makes eye contact   HEENT: NC/AT, EOMI  Resp: Breathing fast and shallow  CV: Normal sinus rhythm, S1 and S2, no r/m/g  Abd: soft, non-distended, non-tender. No HSM.  Ext: no edema, +2 pulses bilaterally  Skin: Warm and dry, no rashes or discolorations    Medications:  MEDICATIONS  (STANDING):  dexAMETHasone  Injectable 6 milliGRAM(s) IV Push daily  dextrose 5%. 1000 milliLiter(s) (100 mL/Hr) IV Continuous <Continuous>  dextrose 5%. 1000 milliLiter(s) (50 mL/Hr) IV Continuous <Continuous>  dextrose 50% Injectable 25 Gram(s) IV Push once  dextrose 50% Injectable 12.5 Gram(s) IV Push once  dextrose 50% Injectable 25 Gram(s) IV Push once  glucagon  Injectable 1 milliGRAM(s) IntraMuscular once  heparin   Injectable 5000 Unit(s) SubCutaneous every 8 hours  insulin lispro (ADMELOG) corrective regimen sliding scale   SubCutaneous every 6 hours  lactated ringers. 1000 milliLiter(s) (75 mL/Hr) IV Continuous <Continuous>  piperacillin/tazobactam IVPB.. 3.375 Gram(s) IV Intermittent every 12 hours    MEDICATIONS  (PRN):  acetaminophen     Tablet .. 650 milliGRAM(s) Oral every 6 hours PRN Temp greater or equal to 38C (100.4F), Mild Pain (1 - 3), Moderate Pain (4 - 6)  dextrose Oral Gel 15 Gram(s) Oral once PRN Blood Glucose LESS THAN 70 milliGRAM(s)/deciliter      Labs:                        11.8   6.68  )-----------( 73       ( 2023 04:45 )             38.2         137  |  98  |  40<H>  ----------------------------<  189<H>  3.5   |  19<L>  |  3.87<H>    Ca    8.0<L>      2023 04:45  Phos  7.1       Mg     1.60         TPro  7.0  /  Alb  3.5  /  TBili  0.8  /  DBili  x   /  AST  131<H>  /  ALT  21  /  AlkPhos  33<L>      PT/INR - ( 2023 04:45 )   PT: 16.4 sec;   INR: 1.41 ratio         PTT - ( 2023 04:45 )  PTT:55.5 sec  Urinalysis Basic - ( 2023 05:34 )    Color: Yellow / Appearance: Slightly Turbid / S.015 / pH: x  Gluc: x / Ketone: Negative  / Bili: Negative / Urobili: <2 mg/dL   Blood: x / Protein: 30 mg/dL / Nitrite: Negative   Leuk Esterase: Negative / RBC: 2 /HPF / WBC 1 /HPF   Sq Epi: x / Non Sq Epi: 2 /HPF / Bacteria: Negative          Radiology:

## 2023-01-14 NOTE — PROVIDER CONTACT NOTE (OTHER) - ASSESSMENT
BP 95/75, . Afebrile. Pt not alert/oriented, nonverbal. Agitated.
/48. Afebrile. Pt not alert/oriented, nonverbal. Agitated.
T 99.1 oral, BP 91/34, HR 87, RR 22, o2sat 94% on o2 4l nc.

## 2023-01-14 NOTE — RAPID RESPONSE TEAM SUMMARY - NSSITUATIONBACKGROUNDRRT_GEN_ALL_CORE
Yohan Hayes is an 82M with PMH significant for but not limited to DM2, HLD, Alzheimer's dementia (AAO2-3 at baseline) who is presenting from home with COVID and altered mental status. Procal elavated >100, on vanc/zosyn.     RRT called for unresponsiveness. Primary team at bedside, stated at baseline patient is A&O x2-3 but since admission, altered, responds to sternal rub/moves extremities.     At bedside, patient is unresponsive to verbal/tactile stimuli including sternal rub. Pinpoint pupils per primary team, no opioids given.   Blood pressure 110s systolic, heart rate in 70s, oxygen saturation - spo2 50%. FS wnl. No significant lab abnormalities in am labs.   Anesthesia called to intubate patient for airway protection and hypoxic resp failure. Anesthesia arrived at bedside, given sedation/paralytics and intubated. Started on levophed for hypotension after sedation.   CT head and CT chest non con done and transferred patient to MICU.

## 2023-01-14 NOTE — SWALLOW BEDSIDE ASSESSMENT ADULT - ADDITIONAL RECOMMENDATIONS
1. This service to follow up as schedule permits. 2. Medical team advised to reconsult this service when patient becomes medically optimized to reassess candidacy for oral diet.

## 2023-01-14 NOTE — SWALLOW BEDSIDE ASSESSMENT ADULT - COMMENTS
Internal Medicine 1/14 "82M with PMH significant for but not limited to DM2, HLD, Alzheimer's dementia (AAO2-3 at baseline) who is presenting from home with COVID and altered mental status."    CXR 1/13 "Left upper lobe pneumonia."    Patient seen at bedside this AM for an initial assessment of the swallow function. Patient did not follow directives or verbalize wants/needs. Patient turning away from SLP when given verbal/tactile prompting to participate in swallow assessment.

## 2023-01-14 NOTE — PROVIDER CONTACT NOTE (OTHER) - BACKGROUND
Patient with DX of Pneumonia due to infectious organism, covid positive, hx of type 2 diabetes, HTN.
Pt admitted for sepsis.
Pt admitted for sepsis.

## 2023-01-14 NOTE — PROGRESS NOTE ADULT - ASSESSMENT
82M DM, CAD, CHF, Dementia, CKD  bibems accompanied by daughter after a fall this evening. Nephrology consulted for acute on ckd    CHIARA on CKD stage 4  baseline ~ 2.1 12/2022  CHIARA possible ATN vs prerenal  renal function continue to worsen  diuretic ACE on hold  ivf as ordered  Feurea <35% suggestive of prerenal  pending renal us  monitor bmp and urine output  avoid nephrotoxic agents    proteinuria  mild  urine p/c ratio 0.2  not significant     acidosis  lactic acidosis improving   improving  monitor    covid/pna  care per team

## 2023-01-14 NOTE — CHART NOTE - NSCHARTNOTEFT_GEN_A_CORE
MICU Accept Note    CHIEF COMPLAINT: SOB    HPI / INTERVAL HISTORY:   Yohan Hayes is an 82M with PMH significant for but not limited to HTN, DM2, HLD, Alzheimer's dementia (AAO2-3 at baseline) who is presenting from home with COVID and altered mental status. Patient is unable to provide history to due to illness and dementia collateral was obtained from daughter Leena. Per family patient started to develop cough 2 days ago, per daughter they performed a home COVID test which was positive (). Over the next several days patient was becoming less alert, however not much was made of it as his mental status does fluctuate at times. Yesterday patient had an episode of fecal incontinence which is atypical (has history of urinary incontinence and wears depends). Last night patient was increasingly difficult to arouse. His wife tried to wake him up and sat him up in the bed, however patient slipped out of the bed. Per family he did not fall and was slowly lower to the fall and he did not hit his head.  (2023 17:01)    RRT  12pm: pt was obtunded. Last known normal per nursing staff around 9am. CT head non con obtained prior to transfer to MICU.    PAST MEDICAL & SURGICAL HISTORY:  DM (diabetes mellitus)    [place] (hard of hearing)    Endophthalmitis  s/p DSAEK of the left eye ?    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    Hand fracture, left  s/p ORIF      FAMILY HISTORY:  FH: type 2 diabetes    SOCIAL HISTORY:  Smoking: prior  Substance Use: none  EtOH Use: none  Advance Directives: full code    HOME MEDICATIONS:  AMLODIPINE BESYLATE 5 MG TAB: TAKE 1 TABLET BY MOUTH EVERY DAY (2023 17:21)  ATORVASTATIN 20 MG TABLET: TAKE 1 TABLET BY MOUTH EVERY DAY (2023 17:21)  Calcium 600+D oral tablet: 1 tab(s) orally once a day (2023 17:21)  CARVEDILOL 12.5 MG TABLET: TAKE 1 TABLET BY MOUTH TWICE A DAY (2023 17:21)  DONEPEZIL HCL ODT 5 MG TABLET: PLACE 1 TABLET (5 MG) ON THE TONGUE AND ALLOW TO DISSOLVE DAILY AT BEDTIME (2023 17:21)  ENTRESTO 49 MG-51 MG TABLET: TAKE 1 TABLET BY MOUTH TWICE A DAY (2023 17:21)  FARXIGA 10 MG TABLET: TAKE 1 TABLET (10 MG) BY MOUTH DAILY. (2023 17:21)  potassium chloride 10 mEq oral capsule, extended release: 1 cap(s) orally once a day (2023 17:21)  TORSEMIDE 5 MG TABLET: TAKE 1 TABLET BY MOUTH EVERY DAY (2023 17:21)      Allergies  No Known Allergies  Intolerances    REVIEW OF SYSTEMS: unable to obtain, pt is sedated and intubated    OBJECTIVE:  ICU Vital Signs Last 24 Hrs  T(C): 37.3 (2023 10:45), Max: 37.3 (2023 10:45)  T(F): 99.1 (2023 10:45), Max: 99.1 (2023 10:45)  HR: 87 (2023 10:45) (67 - 104)  BP: 91/34 (2023 10:45) (91/34 - 127/48)  RR: 22 (2023 10:45) (19 - 34)  SpO2: 94% (2023 10:45) (94% - 100%)    O2 Parameters below as of 2023 10:45  Patient On (Oxygen Delivery Method): nasal cannula  O2 Flow (L/min): 4    CAPILLARY BLOOD GLUCOSE  POCT Blood Glucose.: 214 mg/dL (2023 11:51)      GENERAL: sedated and intubated  HEAD: Atraumatic, Normocephalic  EYES: EOMI, conjunctiva and sclera clear  ENT: Moist mucous membranes  NECK: Supple, No JVD  CHEST/LUNG: + B/l rales, rhonchi. No wheezing or rubs. Synchronous with vent  HEART: Regular rate and rhythm; No murmurs, rubs, or gallops  ABDOMEN: Bowel sounds present; Soft, Nontender, Nondistended.  EXTREMITIES: +1 pitting edema b/l LE and UE. 2+ Peripheral Pulses, brisk capillary refill. No clubbing, cyanosis.  NERVOUS SYSTEM:  Alert & Oriented X3, speech clear. No deficits   MSK: FROM all 4 extremities, full and equal strength  SKIN: No rashes or lesions    LINES:     HOSPITAL MEDICATIONS:  MEDICATIONS  (STANDING):  albumin human  5% IVPB 250 milliLiter(s) IV Intermittent once  azithromycin  IVPB      chlorhexidine 4% Liquid 1 Application(s) Topical <User Schedule>  dexAMETHasone  Injectable 6 milliGRAM(s) IV Push daily  dextrose 5%. 1000 milliLiter(s) (100 mL/Hr) IV Continuous <Continuous>  dextrose 5%. 1000 milliLiter(s) (50 mL/Hr) IV Continuous <Continuous>  dextrose 50% Injectable 25 Gram(s) IV Push once  dextrose 50% Injectable 12.5 Gram(s) IV Push once  dextrose 50% Injectable 25 Gram(s) IV Push once  glucagon  Injectable 1 milliGRAM(s) IntraMuscular once  heparin   Injectable 5000 Unit(s) SubCutaneous every 8 hours  insulin lispro (ADMELOG) corrective regimen sliding scale   SubCutaneous every 6 hours  lactated ringers. 1000 milliLiter(s) (75 mL/Hr) IV Continuous <Continuous>  lactated ringers. 1000 milliLiter(s) (75 mL/Hr) IV Continuous <Continuous>  midodrine. 30 milliGRAM(s) Oral three times a day  piperacillin/tazobactam IVPB.. 3.375 Gram(s) IV Intermittent every 12 hours  vancomycin  IVPB 1250 milliGRAM(s) IV Intermittent once    MEDICATIONS  (PRN):  acetaminophen     Tablet .. 650 milliGRAM(s) Oral every 6 hours PRN Temp greater or equal to 38C (100.4F), Mild Pain (1 - 3), Moderate Pain (4 - 6)  dextrose Oral Gel 15 Gram(s) Oral once PRN Blood Glucose LESS THAN 70 milliGRAM(s)/deciliter      LABS:                        11.8   6.68  )-----------( 73       ( 2023 04:45 )             38.2         137  |  98  |  40<H>  ----------------------------<  189<H>  3.5   |  19<L>  |  3.87<H>    Ca    8.0<L>      2023 04:45  Phos  7.1       Mg     1.60         TPro  7.0  /  Alb  3.5  /  TBili  0.8  /  DBili  x   /  AST  131<H>  /  ALT  21  /  AlkPhos  33<L>      PT/INR - ( 2023 04:45 )   PT: 16.4 sec;   INR: 1.41 ratio         PTT - ( 2023 04:45 )  PTT:55.5 sec    pH, Venous: 7.26 (23 @ 09:30)  pCO2, Venous: 50 mmHg (23 @ 09:30)  pO2, Venous: 42 mmHg (23 @ 09:30)  HCO3, Venous: 22 mmol/L (23 @ 09:30)      Urinalysis Basic - ( 2023 05:34 )    Color: Yellow / Appearance: Slightly Turbid / S.015 / pH: x  Gluc: x / Ketone: Negative  / Bili: Negative / Urobili: <2 mg/dL   Blood: x / Protein: 30 mg/dL / Nitrite: Negative   Leuk Esterase: Negative / RBC: 2 /HPF / WBC 1 /HPF   Sq Epi: x / Non Sq Epi: 2 /HPF / Bacteria: Negative      CARDIAC MARKERS ( 2023 04:45 )  x     / x     / 4265 U/L / x     / x          CAPILLARY BLOOD GLUCOSE  POCT Blood Glucose.: 214 mg/dL (2023 11:51)  POCT Blood Glucose.: 170 mg/dL (2023 06:47)  POCT Blood Glucose.: 203 mg/dL (2023 00:20)  POCT Blood Glucose.: 202 mg/dL (2023 22:08)  POCT Blood Glucose.: 248 mg/dL (2023 17:11)      RADIOLOGY & ADDITIONAL TESTS:    82M with PMH HTN, DM2, HLD, Alzheimer's dementia (AAO2-3 at baseline) who is presenting with acute hypoxic respiratory failure 2/2 COVID-19 PNA, complicated by AMS, now intubated and sedated in MICU    NEURO:  #Mental status: A/Ox1 on admission to hospital. baseline A/Ox3.   -CT head negative on admission. Will f/u repeat CT head done  prior to MICU transfer  -Likely 2/2 ____ (structural (bleed or stroke), encephalitis, meningitis, non convulsive status epilepticus, metabolic cause (endogenous vs exogenous)).  -Currently A&O x 3  -Eligible for early mobilization and immediate physical therapy?    CV:    #CHF: EF __%, on JVD ***  -Bblocker: metoprolol succinate 25mg qD / carvedilol 3.125mg qD  -Diuretic: furosemide (lasix) 20-40-60-80 mg qD | torsemide | bumetanide (bumex) 1mg qD  -Trend I/Os and daily weights, and Cr    #Arrythmia:   - Monitor on tele    #AFib:   - CHADSVASC score:  - HAS-BLED score:  - a/c: DOAC (apixiban 5mg BID (Eliquis)| dabigatran 150mg BID (Pradexa)| Riveroxaban 20mg qD (Xeralto) > Coumadin  - If new: get echo (check for tachycardia, valvular AF, L atrial size which is large if chronic)  - Rate control: BB/Digoxin/Amio if low EF  - Monitor telemetry    #Hemodynamically stable/unstable:  - On pressors due to ____ shock (cardiogenic due to muscle or valve failure, obstructive due to peff, PE, PTX, hypovolemic, vasopegic)     PULM:  #COPD: *Home meds* on __L O2    RENAL:  #CHIARA:   -UO:  -Blair:    GI:  #Transaminitis: Elevated LFTs  #Diet:  #Bowel regiment:    ENDO:  #DM2: HbA1c ***   - Insulin Sliding Scale    METABOLIC:  #Electrolyte abnormalities    HEMATOLOGIC:  #CBC results show  #Coag panel shows  #DVT prophylaxis with     ID:  #COVID19 PNA  -Dexamethasone   -Remdesivir    SKIN:  #Lines: 2 PIVs  #Decubitus ulcers: MICU Accept Note    CHIEF COMPLAINT: SOB    HPI / INTERVAL HISTORY:   Yohan Hayes is an 82M with PMH significant for but not limited to HTN, DM2, HLD, Alzheimer's dementia (AAO2-3 at baseline) who is presenting from home with COVID and altered mental status. Patient is unable to provide history to due to illness and dementia collateral was obtained from daughter Leena. Per family patient started to develop cough 2 days ago, per daughter they performed a home COVID test which was positive (). Over the next several days patient was becoming less alert, however not much was made of it as his mental status does fluctuate at times. Yesterday patient had an episode of fecal incontinence which is atypical (has history of urinary incontinence and wears depends). Last night patient was increasingly difficult to arouse. His wife tried to wake him up and sat him up in the bed, however patient slipped out of the bed. Per family he did not fall and was slowly lower to the fall and he did not hit his head.  (2023 17:01)    RRT  12pm: pt was obtunded. Last known normal per nursing staff around 9am. CT head non con obtained prior to transfer to MICU.    PAST MEDICAL & SURGICAL HISTORY:  DM (diabetes mellitus)    [place] (hard of hearing)    Endophthalmitis  s/p DSAEK of the left eye ?    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    Hand fracture, left  s/p ORIF      FAMILY HISTORY:  FH: type 2 diabetes    SOCIAL HISTORY:  Smoking: prior  Substance Use: none  EtOH Use: none  Advance Directives: full code    HOME MEDICATIONS:  AMLODIPINE BESYLATE 5 MG TAB: TAKE 1 TABLET BY MOUTH EVERY DAY (2023 17:21)  ATORVASTATIN 20 MG TABLET: TAKE 1 TABLET BY MOUTH EVERY DAY (2023 17:21)  Calcium 600+D oral tablet: 1 tab(s) orally once a day (2023 17:21)  CARVEDILOL 12.5 MG TABLET: TAKE 1 TABLET BY MOUTH TWICE A DAY (2023 17:21)  DONEPEZIL HCL ODT 5 MG TABLET: PLACE 1 TABLET (5 MG) ON THE TONGUE AND ALLOW TO DISSOLVE DAILY AT BEDTIME (2023 17:21)  ENTRESTO 49 MG-51 MG TABLET: TAKE 1 TABLET BY MOUTH TWICE A DAY (2023 17:21)  FARXIGA 10 MG TABLET: TAKE 1 TABLET (10 MG) BY MOUTH DAILY. (2023 17:21)  potassium chloride 10 mEq oral capsule, extended release: 1 cap(s) orally once a day (2023 17:21)  TORSEMIDE 5 MG TABLET: TAKE 1 TABLET BY MOUTH EVERY DAY (2023 17:21)      Allergies  No Known Allergies  Intolerances    REVIEW OF SYSTEMS: unable to obtain, pt is sedated and intubated    OBJECTIVE:  ICU Vital Signs Last 24 Hrs  T(C): 37.3 (2023 10:45), Max: 37.3 (2023 10:45)  T(F): 99.1 (2023 10:45), Max: 99.1 (2023 10:45)  HR: 87 (2023 10:45) (67 - 104)  BP: 91/34 (2023 10:45) (91/34 - 127/48)  RR: 22 (2023 10:45) (19 - 34)  SpO2: 94% (2023 10:45) (94% - 100%)    O2 Parameters below as of 2023 10:45  Patient On (Oxygen Delivery Method): nasal cannula  O2 Flow (L/min): 4    CAPILLARY BLOOD GLUCOSE  POCT Blood Glucose.: 214 mg/dL (2023 11:51)      GENERAL: sedated and intubated  HEAD: Atraumatic, Normocephalic  EYES: EOMI, conjunctiva and sclera clear  ENT: Moist mucous membranes  NECK: Supple, No JVD  CHEST/LUNG: + B/l rales, rhonchi. No wheezing or rubs. Synchronous with vent  HEART: Regular rate and rhythm; No murmurs, rubs, or gallops  ABDOMEN: Bowel sounds present; Soft, Nontender, Nondistended.  EXTREMITIES: +1 pitting edema b/l LE and UE. 2+ Peripheral Pulses, brisk capillary refill. No clubbing, cyanosis.  NERVOUS SYSTEM:  Alert & Oriented X3, speech clear. No deficits   MSK: FROM all 4 extremities, full and equal strength  SKIN: No rashes or lesions    LINES:     HOSPITAL MEDICATIONS:  MEDICATIONS  (STANDING):  albumin human  5% IVPB 250 milliLiter(s) IV Intermittent once  azithromycin  IVPB      chlorhexidine 4% Liquid 1 Application(s) Topical <User Schedule>  dexAMETHasone  Injectable 6 milliGRAM(s) IV Push daily  dextrose 5%. 1000 milliLiter(s) (100 mL/Hr) IV Continuous <Continuous>  dextrose 5%. 1000 milliLiter(s) (50 mL/Hr) IV Continuous <Continuous>  dextrose 50% Injectable 25 Gram(s) IV Push once  dextrose 50% Injectable 12.5 Gram(s) IV Push once  dextrose 50% Injectable 25 Gram(s) IV Push once  glucagon  Injectable 1 milliGRAM(s) IntraMuscular once  heparin   Injectable 5000 Unit(s) SubCutaneous every 8 hours  insulin lispro (ADMELOG) corrective regimen sliding scale   SubCutaneous every 6 hours  lactated ringers. 1000 milliLiter(s) (75 mL/Hr) IV Continuous <Continuous>  lactated ringers. 1000 milliLiter(s) (75 mL/Hr) IV Continuous <Continuous>  midodrine. 30 milliGRAM(s) Oral three times a day  piperacillin/tazobactam IVPB.. 3.375 Gram(s) IV Intermittent every 12 hours  vancomycin  IVPB 1250 milliGRAM(s) IV Intermittent once    MEDICATIONS  (PRN):  acetaminophen     Tablet .. 650 milliGRAM(s) Oral every 6 hours PRN Temp greater or equal to 38C (100.4F), Mild Pain (1 - 3), Moderate Pain (4 - 6)  dextrose Oral Gel 15 Gram(s) Oral once PRN Blood Glucose LESS THAN 70 milliGRAM(s)/deciliter      LABS:                        11.8   6.68  )-----------( 73       ( 2023 04:45 )             38.2         137  |  98  |  40<H>  ----------------------------<  189<H>  3.5   |  19<L>  |  3.87<H>    Ca    8.0<L>      2023 04:45  Phos  7.1       Mg     1.60         TPro  7.0  /  Alb  3.5  /  TBili  0.8  /  DBili  x   /  AST  131<H>  /  ALT  21  /  AlkPhos  33<L>      PT/INR - ( 2023 04:45 )   PT: 16.4 sec;   INR: 1.41 ratio         PTT - ( 2023 04:45 )  PTT:55.5 sec    pH, Venous: 7.26 (23 @ 09:30)  pCO2, Venous: 50 mmHg (23 @ 09:30)  pO2, Venous: 42 mmHg (23 @ 09:30)  HCO3, Venous: 22 mmol/L (23 @ 09:30)      Urinalysis Basic - ( 2023 05:34 )    Color: Yellow / Appearance: Slightly Turbid / S.015 / pH: x  Gluc: x / Ketone: Negative  / Bili: Negative / Urobili: <2 mg/dL   Blood: x / Protein: 30 mg/dL / Nitrite: Negative   Leuk Esterase: Negative / RBC: 2 /HPF / WBC 1 /HPF   Sq Epi: x / Non Sq Epi: 2 /HPF / Bacteria: Negative      CARDIAC MARKERS ( 2023 04:45 )  x     / x     / 4265 U/L / x     / x          CAPILLARY BLOOD GLUCOSE  POCT Blood Glucose.: 214 mg/dL (2023 11:51)  POCT Blood Glucose.: 170 mg/dL (2023 06:47)  POCT Blood Glucose.: 203 mg/dL (2023 00:20)  POCT Blood Glucose.: 202 mg/dL (2023 22:08)  POCT Blood Glucose.: 248 mg/dL (2023 17:11)      RADIOLOGY & ADDITIONAL TESTS:    82M with PMH HTN, DM2, HLD, Alzheimer's dementia (AAO2-3 at baseline) who is presenting to the ED with acute hypoxic respiratory failure 2/2 COVID-19 PNA, complicated by AMS, admitted to MICU due to progression of hypoxemia and AMS, now intubated and sedated.    NEURO:  #Mental status: A/Ox1 on admission to hospital. baseline A/Ox3.   -CT head negative on admission. Will f/u repeat CT head done  prior to MICU transfer  -likely 2/2 metabolic encephalopathia i/s/o new COVID19 pna and potential structural lesion, must r/o with CT head     CV:    #CHF: EF __%, on JVD ***  -Bblocker: metoprolol succinate 25mg qD / carvedilol 3.125mg qD  -Diuretic: furosemide (lasix) 20-40-60-80 mg qD | torsemide | bumetanide (bumex) 1mg qD  -Trend I/Os and daily weights, and Cr    #Arrythmia:   - Monitor on tele    #AFib:   - CHADSVASC score:  - HAS-BLED score:  - a/c: DOAC (apixiban 5mg BID (Eliquis)| dabigatran 150mg BID (Pradexa)| Riveroxaban 20mg qD (Xeralto) > Coumadin  - If new: get echo (check for tachycardia, valvular AF, L atrial size which is large if chronic)  - Rate control: BB/Digoxin/Amio if low EF  - Monitor telemetry    #Hemodynamically stable/unstable:  - On pressors due to ____ shock (cardiogenic due to muscle or valve failure, obstructive due to peff, PE, PTX, hypovolemic, vasopegic)     PULM:  #COVID pna  -sepsis 2/2 covid pna.   - PCR positive , home test positive   -holding remdesivir in setting of kidney failure  -c/w dexamethasone 6mg (-)      RENAL:  #CHIARA:   -UO:  -Blair:    GI:  #Transaminitis: Elevated LFTs  #Diet:  #Bowel regiment:    ENDO:  #DM2: HbA1c ***   - Insulin Sliding Scale    METABOLIC:  #Electrolyte abnormalities    HEMATOLOGIC:  #CBC results show  #Coag panel shows  #DVT prophylaxis with     ID:  #COVID19 PNA  -Dexamethasone   -Remdesivir    SKIN:  #Lines: 2 PIVs  #Decubitus ulcers: MICU Accept Note    CHIEF COMPLAINT: SOB    HPI / INTERVAL HISTORY:   Yohan Hayes is an 82M with PMH significant for but not limited to HTN, DM2, HLD, Alzheimer's dementia (AAO2-3 at baseline) who is presenting from home with COVID and altered mental status. Patient is unable to provide history to due to illness and dementia collateral was obtained from daughter Leena. Per family patient started to develop cough 2 days ago, per daughter they performed a home COVID test which was positive (). Over the next several days patient was becoming less alert, however not much was made of it as his mental status does fluctuate at times. Yesterday patient had an episode of fecal incontinence which is atypical (has history of urinary incontinence and wears depends). Last night patient was increasingly difficult to arouse. His wife tried to wake him up and sat him up in the bed, however patient slipped out of the bed. Per family he did not fall and was slowly lower to the fall and he did not hit his head.  (2023 17:01)    RRT  12pm: pt was obtunded. Last known normal per nursing staff around 9am. CT head non con obtained prior to transfer to MICU.    PAST MEDICAL & SURGICAL HISTORY:  DM (diabetes mellitus)    [place] (hard of hearing)    Endophthalmitis  s/p DSAEK of the left eye ?    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    Hand fracture, left  s/p ORIF      FAMILY HISTORY:  FH: type 2 diabetes    SOCIAL HISTORY:  Smoking: prior  Substance Use: none  EtOH Use: none  Advance Directives: full code    HOME MEDICATIONS:  AMLODIPINE BESYLATE 5 MG TAB: TAKE 1 TABLET BY MOUTH EVERY DAY (2023 17:21)  ATORVASTATIN 20 MG TABLET: TAKE 1 TABLET BY MOUTH EVERY DAY (2023 17:21)  Calcium 600+D oral tablet: 1 tab(s) orally once a day (2023 17:21)  CARVEDILOL 12.5 MG TABLET: TAKE 1 TABLET BY MOUTH TWICE A DAY (2023 17:21)  DONEPEZIL HCL ODT 5 MG TABLET: PLACE 1 TABLET (5 MG) ON THE TONGUE AND ALLOW TO DISSOLVE DAILY AT BEDTIME (2023 17:21)  ENTRESTO 49 MG-51 MG TABLET: TAKE 1 TABLET BY MOUTH TWICE A DAY (2023 17:21)  FARXIGA 10 MG TABLET: TAKE 1 TABLET (10 MG) BY MOUTH DAILY. (2023 17:21)  potassium chloride 10 mEq oral capsule, extended release: 1 cap(s) orally once a day (2023 17:21)  TORSEMIDE 5 MG TABLET: TAKE 1 TABLET BY MOUTH EVERY DAY (2023 17:21)      Allergies  No Known Allergies  Intolerances    REVIEW OF SYSTEMS: unable to obtain, pt is sedated and intubated    OBJECTIVE:  ICU Vital Signs Last 24 Hrs  T(C): 37.3 (2023 10:45), Max: 37.3 (2023 10:45)  T(F): 99.1 (2023 10:45), Max: 99.1 (2023 10:45)  HR: 87 (2023 10:45) (67 - 104)  BP: 91/34 (2023 10:45) (91/34 - 127/48)  RR: 22 (2023 10:45) (19 - 34)  SpO2: 94% (2023 10:45) (94% - 100%)    O2 Parameters below as of 2023 10:45  Patient On (Oxygen Delivery Method): nasal cannula  O2 Flow (L/min): 4    CAPILLARY BLOOD GLUCOSE  POCT Blood Glucose.: 214 mg/dL (2023 11:51)      GENERAL: sedated and intubated  HEAD: Atraumatic, Normocephalic  EYES: EOMI, conjunctiva and sclera clear  ENT: Moist mucous membranes  NECK: Supple, No JVD  CHEST/LUNG: + B/l rales, rhonchi. No wheezing or rubs. Synchronous with vent  HEART: Regular rate and rhythm; No murmurs, rubs, or gallops  ABDOMEN: Bowel sounds present; Soft, Nontender, Nondistended.  EXTREMITIES: +1 pitting edema b/l LE and UE. 2+ Peripheral Pulses, brisk capillary refill. No clubbing, cyanosis.  NERVOUS SYSTEM:  intubated and sedated.  SKIN: No rashes or lesions    LINES:     HOSPITAL MEDICATIONS:  MEDICATIONS  (STANDING):  albumin human  5% IVPB 250 milliLiter(s) IV Intermittent once  azithromycin  IVPB      chlorhexidine 4% Liquid 1 Application(s) Topical <User Schedule>  dexAMETHasone  Injectable 6 milliGRAM(s) IV Push daily  dextrose 5%. 1000 milliLiter(s) (100 mL/Hr) IV Continuous <Continuous>  dextrose 5%. 1000 milliLiter(s) (50 mL/Hr) IV Continuous <Continuous>  dextrose 50% Injectable 25 Gram(s) IV Push once  dextrose 50% Injectable 12.5 Gram(s) IV Push once  dextrose 50% Injectable 25 Gram(s) IV Push once  glucagon  Injectable 1 milliGRAM(s) IntraMuscular once  heparin   Injectable 5000 Unit(s) SubCutaneous every 8 hours  insulin lispro (ADMELOG) corrective regimen sliding scale   SubCutaneous every 6 hours  lactated ringers. 1000 milliLiter(s) (75 mL/Hr) IV Continuous <Continuous>  lactated ringers. 1000 milliLiter(s) (75 mL/Hr) IV Continuous <Continuous>  midodrine. 30 milliGRAM(s) Oral three times a day  piperacillin/tazobactam IVPB.. 3.375 Gram(s) IV Intermittent every 12 hours  vancomycin  IVPB 1250 milliGRAM(s) IV Intermittent once    MEDICATIONS  (PRN):  acetaminophen     Tablet .. 650 milliGRAM(s) Oral every 6 hours PRN Temp greater or equal to 38C (100.4F), Mild Pain (1 - 3), Moderate Pain (4 - 6)  dextrose Oral Gel 15 Gram(s) Oral once PRN Blood Glucose LESS THAN 70 milliGRAM(s)/deciliter      LABS:                        11.8   6.68  )-----------( 73       ( 2023 04:45 )             38.2         137  |  98  |  40<H>  ----------------------------<  189<H>  3.5   |  19<L>  |  3.87<H>    Ca    8.0<L>      2023 04:45  Phos  7.1       Mg     1.60         TPro  7.0  /  Alb  3.5  /  TBili  0.8  /  DBili  x   /  AST  131<H>  /  ALT  21  /  AlkPhos  33<L>      PT/INR - ( 2023 04:45 )   PT: 16.4 sec;   INR: 1.41 ratio         PTT - ( 2023 04:45 )  PTT:55.5 sec    pH, Venous: 7.26 (23 @ 09:30)  pCO2, Venous: 50 mmHg (23 @ 09:30)  pO2, Venous: 42 mmHg (23 @ 09:30)  HCO3, Venous: 22 mmol/L (23 @ 09:30)      Urinalysis Basic - ( 2023 05:34 )    Color: Yellow / Appearance: Slightly Turbid / S.015 / pH: x  Gluc: x / Ketone: Negative  / Bili: Negative / Urobili: <2 mg/dL   Blood: x / Protein: 30 mg/dL / Nitrite: Negative   Leuk Esterase: Negative / RBC: 2 /HPF / WBC 1 /HPF   Sq Epi: x / Non Sq Epi: 2 /HPF / Bacteria: Negative      CARDIAC MARKERS ( 2023 04:45 )  x     / x     / 4265 U/L / x     / x          CAPILLARY BLOOD GLUCOSE  POCT Blood Glucose.: 214 mg/dL (2023 11:51)  POCT Blood Glucose.: 170 mg/dL (2023 06:47)  POCT Blood Glucose.: 203 mg/dL (2023 00:20)  POCT Blood Glucose.: 202 mg/dL (2023 22:08)  POCT Blood Glucose.: 248 mg/dL (2023 17:11)      RADIOLOGY & ADDITIONAL TESTS:    82M with PMH HTN, DM2, HLD, Alzheimer's dementia (AAO2-3 at baseline) who is presenting to the ED with acute hypoxic respiratory failure 2/2 COVID-19 PNA, complicated by AMS, admitted to MICU due to progression of hypoxemia and AMS, now intubated and sedated.    NEURO:  #Mental status: A/Ox1 on admission to hospital. baseline A/Ox3.   -CT head negative on admission. repeat CT head done  prior to MICU transfer with no acute changes.  -likely 2/2 metabolic encephalopathia i/s/o new COVID19 pna and potential structural lesion, must r/o with CT head     CV:  #hemodynamically unstable due to shock  hypovolemic vs vasoplegic shock requiring levophed.  -Trend I/Os and daily weights, and Cr  -IL bolus    #history of HF  -diastolic disfunction grade 1-2 on point of care ultrasound, pending echo  -ordered official echo  -holding home entresto, and torsemide in setting of CHIARA  -holding carvedilol in setting of hypotension     #HLD (hyperlipidemia).   -c/w home atovastatin 20 mg- will give meds    #HTN (hypertension)  -BP low on admission and patient is currently in shock and on levophed  -holding home carvedilol, Entresto, amlodipine in setting of sepsis  continue to monitor BP.    #tropenemia- 54  -likely 2/2 CHIARA  -no ST segment elevation, depressions, or signs of ischemia  -will trend cardiac enzymes  -on tele    PULM:  #COVID pna  -acute hypoxic respiratory failure/ sepsis 2/2 covid pna  - PCR positive , home test positive   -holding remdesivir in setting of kidney failure  -c/w dexamethasone 6mg (-)  -patient is intubated on fio2 70, rate 26, volume 450, peep 8.     #multifocal pna  -2/2 aspiration pna  -CT chest: Multifocal consolidations most predominant in the left upper and right  lower lobes which may be due to infection. Additional possibility of a prior aspiration event should also be entertained given that there is   extensive endobronchial secretions and impaction in the dependent posterior portion of the right lower lobe and dependent portions of the right upper lobe.  -on Zosyn (-), azithro (-), and vanc (-)  -pending MRSA swab, legionella, sputum culture  -Bcx from  NGTD    #emphysema  -emphysematous seen on CT chest by MICU team   -started Symbicort BID     =====GI=====  #GI Prophylaxis  - Protonix 40mg IV QD    #Diet  - NPO with meds    =====Renal/=====  #Electrolytes  - Maintain K>4, Phos>3, Mag>2, iCal>1    =====Endocrine=====  #DM2  - While NPO, FSBG and ROLAND q6h  -pending A1c  -glucose here has been >200  on Farxiga at home, unclear if for CHF vs DM     #Acute metabolic encephalopathy.   -Toxic metabolic encephalopathy in setting of Alzheimer  -continue to treat infection as outlined in ID  -on Aricept at home, holding while NPO.    #CHIARA on CKD  -baseline was 2.1 in 2022 per Nephrology   UA without evidence of infection  -will continue to monitor and trend.  -trending upwards  -monitor I/Os    =====Infectious Disease=====  Patient is afebrile with no leukocytosis  #multifocal pna  --on Zosyn (-), azithro (-), and vanc (-)  -pending MRSA swab, legionella, sputum Cx    -Bcx from  NGTD     #covid pna  -on azithro (-)  -c/w dexamethasone 6mg (-)    =====Heme/Onc=====  #DVT Phrophylaxis  - heparin subcutaneous 500 q8    =====Ethics=====  FULL CODE MICU Accept Note    CHIEF COMPLAINT: SOB    HPI / INTERVAL HISTORY:   Yohan Hayes is an 82M with PMH significant for but not limited to HTN, DM2, HLD, Alzheimer's dementia (AAO2-3 at baseline) who is presenting from home with COVID and altered mental status. Patient is unable to provide history to due to illness and dementia collateral was obtained from daughter Leena. Per family patient started to develop cough 2 days ago, per daughter they performed a home COVID test which was positive (). Over the next several days patient was becoming less alert, however not much was made of it as his mental status does fluctuate at times. Yesterday patient had an episode of fecal incontinence which is atypical (has history of urinary incontinence and wears depends). Last night patient was increasingly difficult to arouse. His wife tried to wake him up and sat him up in the bed, however patient slipped out of the bed. Per family he did not fall and was slowly lower to the fall and he did not hit his head.  (2023 17:01)    RRT  12pm: pt was obtunded. Last known normal per nursing staff around 9am. CT head non con obtained prior to transfer to MICU.    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    Hand fracture, left  s/p ORIF      FAMILY HISTORY:  FH: type 2 diabetes    SOCIAL HISTORY:  Smoking: prior  Substance Use: none  EtOH Use: none  Advance Directives: full code    HOME MEDICATIONS:  AMLODIPINE BESYLATE 5 MG TAB: TAKE 1 TABLET BY MOUTH EVERY DAY (2023 17:21)  ATORVASTATIN 20 MG TABLET: TAKE 1 TABLET BY MOUTH EVERY DAY (2023 17:21)  Calcium 600+D oral tablet: 1 tab(s) orally once a day (2023 17:21)  CARVEDILOL 12.5 MG TABLET: TAKE 1 TABLET BY MOUTH TWICE A DAY (2023 17:21)  DONEPEZIL HCL ODT 5 MG TABLET: PLACE 1 TABLET (5 MG) ON THE TONGUE AND ALLOW TO DISSOLVE DAILY AT BEDTIME (2023 17:21)  ENTRESTO 49 MG-51 MG TABLET: TAKE 1 TABLET BY MOUTH TWICE A DAY (2023 17:21)  FARXIGA 10 MG TABLET: TAKE 1 TABLET (10 MG) BY MOUTH DAILY. (2023 17:21)  potassium chloride 10 mEq oral capsule, extended release: 1 cap(s) orally once a day (2023 17:21)  TORSEMIDE 5 MG TABLET: TAKE 1 TABLET BY MOUTH EVERY DAY (2023 17:21)      Allergies  No Known Allergies  Intolerances    REVIEW OF SYSTEMS: unable to obtain, pt is sedated and intubated    OBJECTIVE:  ICU Vital Signs Last 24 Hrs  T(C): 37.3 (2023 10:45), Max: 37.3 (2023 10:45)  T(F): 99.1 (2023 10:45), Max: 99.1 (2023 10:45)  HR: 87 (2023 10:45) (67 - 104)  BP: 91/34 (2023 10:45) (91/34 - 127/48)  RR: 22 (2023 10:45) (19 - 34)  SpO2: 94% (2023 10:45) (94% - 100%)    O2 Parameters below as of 2023 10:45  Patient On (Oxygen Delivery Method): nasal cannula  O2 Flow (L/min): 4    CAPILLARY BLOOD GLUCOSE  POCT Blood Glucose.: 214 mg/dL (2023 11:51)      GENERAL: sedated and intubated  HEAD: Atraumatic, Normocephalic  EYES: EOMI, conjunctiva and sclera clear  ENT: Moist mucous membranes  NECK: Supple, No JVD  CHEST/LUNG: + B/l rales, rhonchi. No wheezing or rubs. Synchronous with vent  HEART: Regular rate and rhythm; No murmurs, rubs, or gallops  ABDOMEN: Bowel sounds present; Soft, Nontender, Nondistended.  EXTREMITIES: +1 pitting edema b/l LE and UE. 2+ Peripheral Pulses, brisk capillary refill. No clubbing, cyanosis.  NERVOUS SYSTEM:  intubated and sedated.  SKIN: No rashes or lesions    LINES:     HOSPITAL MEDICATIONS:  MEDICATIONS  (STANDING):  albumin human  5% IVPB 250 milliLiter(s) IV Intermittent once  azithromycin  IVPB      chlorhexidine 4% Liquid 1 Application(s) Topical <User Schedule>  dexAMETHasone  Injectable 6 milliGRAM(s) IV Push daily  dextrose 5%. 1000 milliLiter(s) (100 mL/Hr) IV Continuous <Continuous>  dextrose 5%. 1000 milliLiter(s) (50 mL/Hr) IV Continuous <Continuous>  dextrose 50% Injectable 25 Gram(s) IV Push once  dextrose 50% Injectable 12.5 Gram(s) IV Push once  dextrose 50% Injectable 25 Gram(s) IV Push once  glucagon  Injectable 1 milliGRAM(s) IntraMuscular once  heparin   Injectable 5000 Unit(s) SubCutaneous every 8 hours  insulin lispro (ADMELOG) corrective regimen sliding scale   SubCutaneous every 6 hours  lactated ringers. 1000 milliLiter(s) (75 mL/Hr) IV Continuous <Continuous>  lactated ringers. 1000 milliLiter(s) (75 mL/Hr) IV Continuous <Continuous>  midodrine. 30 milliGRAM(s) Oral three times a day  piperacillin/tazobactam IVPB.. 3.375 Gram(s) IV Intermittent every 12 hours  vancomycin  IVPB 1250 milliGRAM(s) IV Intermittent once    MEDICATIONS  (PRN):  acetaminophen     Tablet .. 650 milliGRAM(s) Oral every 6 hours PRN Temp greater or equal to 38C (100.4F), Mild Pain (1 - 3), Moderate Pain (4 - 6)  dextrose Oral Gel 15 Gram(s) Oral once PRN Blood Glucose LESS THAN 70 milliGRAM(s)/deciliter      LABS:                        11.8   6.68  )-----------( 73       ( 2023 04:45 )             38.2         137  |  98  |  40<H>  ----------------------------<  189<H>  3.5   |  19<L>  |  3.87<H>    Ca    8.0<L>      2023 04:45  Phos  7.1       Mg     1.60         TPro  7.0  /  Alb  3.5  /  TBili  0.8  /  DBili  x   /  AST  131<H>  /  ALT  21  /  AlkPhos  33<L>      PT/INR - ( 2023 04:45 )   PT: 16.4 sec;   INR: 1.41 ratio         PTT - ( 2023 04:45 )  PTT:55.5 sec    pH, Venous: 7.26 (23 @ 09:30)  pCO2, Venous: 50 mmHg (23 @ 09:30)  pO2, Venous: 42 mmHg (23 @ 09:30)  HCO3, Venous: 22 mmol/L (23 @ 09:30)      Urinalysis Basic - ( 2023 05:34 )    Color: Yellow / Appearance: Slightly Turbid / S.015 / pH: x  Gluc: x / Ketone: Negative  / Bili: Negative / Urobili: <2 mg/dL   Blood: x / Protein: 30 mg/dL / Nitrite: Negative   Leuk Esterase: Negative / RBC: 2 /HPF / WBC 1 /HPF   Sq Epi: x / Non Sq Epi: 2 /HPF / Bacteria: Negative      CARDIAC MARKERS ( 2023 04:45 )  x     / x     / 4265 U/L / x     / x          CAPILLARY BLOOD GLUCOSE  POCT Blood Glucose.: 214 mg/dL (2023 11:51)  POCT Blood Glucose.: 170 mg/dL (2023 06:47)  POCT Blood Glucose.: 203 mg/dL (2023 00:20)  POCT Blood Glucose.: 202 mg/dL (2023 22:08)  POCT Blood Glucose.: 248 mg/dL (2023 17:11)      RADIOLOGY & ADDITIONAL TESTS:    82M with PMH HTN, DM2, HLD, Alzheimer's dementia (AAO2-3 at baseline) who is presenting to the ED with acute hypoxic respiratory failure 2/2 COVID-19 PNA, complicated by AMS, admitted to MICU due to progression of hypoxemia and AMS, now intubated and sedated.    NEURO:  #Mental status: A/Ox1 on admission to hospital. baseline A/Ox3.   -CT head negative on admission. repeat CT head done  prior to MICU transfer with no acute changes.  -likely 2/2 metabolic encephalopathia i/s/o new COVID19 pna and potential structural lesion, must r/o with CT head     CV:  #hemodynamically unstable due to shock  hypovolemic vs vasoplegic shock requiring levophed.  -Trend I/Os and daily weights, and Cr  -IL bolus    #history of HF  -diastolic disfunction grade 1-2 on point of care ultrasound, pending echo  -ordered official echo  -holding home entresto, and torsemide in setting of CHIARA  -holding carvedilol in setting of hypotension     #HLD (hyperlipidemia).   -c/w home atovastatin 20 mg- will give meds    #HTN (hypertension)  -BP low on admission and patient is currently in shock and on levophed  -holding home carvedilol, Entresto, amlodipine in setting of sepsis  continue to monitor BP.    #tropenemia- 54  -likely 2/2 CHIARA  -no ST segment elevation, depressions, or signs of ischemia  -will trend cardiac enzymes  -on tele    PULM:  #COVID pna  -acute hypoxic respiratory failure/ sepsis 2/2 covid pna  - PCR positive , home test positive   -holding remdesivir in setting of kidney failure  -c/w dexamethasone 6mg (-)  -patient is intubated on fio2 70, rate 26, volume 450, peep 8.     #multifocal pna  -2/2 aspiration pna  -CT chest: Multifocal consolidations most predominant in the left upper and right  lower lobes which may be due to infection. Additional possibility of a prior aspiration event should also be entertained given that there is   extensive endobronchial secretions and impaction in the dependent posterior portion of the right lower lobe and dependent portions of the right upper lobe.  -on Zosyn (-), azithro (-), and vanc (-)  -pending MRSA swab, legionella, sputum culture  -Bcx from  NGTD    #emphysema  -emphysematous seen on CT chest by MICU team   -started Symbicort BID     =====GI=====  #GI Prophylaxis  - Protonix 40mg IV QD    #Diet  - NPO with meds    =====Renal/=====  #Electrolytes  - Maintain K>4, Phos>3, Mag>2, iCal>1    =====Endocrine=====  #DM2  - While NPO, FSBG and ROLAND q6h  -pending A1c  -glucose here has been >200  on Farxiga at home, unclear if for CHF vs DM     #Acute metabolic encephalopathy.   -Toxic metabolic encephalopathy in setting of Alzheimer  -continue to treat infection as outlined in ID  -on Aricept at home, holding while NPO.    #CHIARA on CKD  -baseline was 2.1 in 2022 per Nephrology   UA without evidence of infection  -will continue to monitor and trend.  -trending upwards  -monitor I/Os    =====Infectious Disease=====  Patient is afebrile with no leukocytosis  #multifocal pna  --on Zosyn (-), azithro (-), and vanc (-)  -pending MRSA swab, legionella, sputum Cx    -Bcx from  NGTD     #covid pna  -on azithro (-)  -c/w dexamethasone 6mg (-)    =====Heme/Onc=====  #DVT Phrophylaxis  - heparin subcutaneous 500 q8    =====Ethics=====  FULL CODE     Attestation Statements:  I have personally seen and examined the patient.  I fully participated in the care of this patient.  I have made amendments to the documentation where necessary, and agree with the history, physical exam, and plan as documented by the Resident.     The patient is a 82M with PMH HTN, DM2, HLD, Alzheimer's dementia who presented to the hospital initially for COVID pna, also found to be in CHIARA. Patient was admitted to the floors, Was having AMS as well as respiratory complaints. COVID PCR was positive started on dexamethasone on the floors. However, RRT called for worsening AMS and hypoxemic RF. Patient admitted to MICU. CT now showing acute findings, CT chest with signs of multifocal pna and likely aspiration as well as emphysema. Patient chronic smoker until several months ago.     # Acute hypoxemic and hypercapnic RF  # Multifocal pna  # Aspiration  # Shock on pressors  # Lactic acidosis  # AMS  # COVID  # CHIARA  - AMS likely in setting of hypoxemia and sepsis, c/w propofol for now  - Shock, likely vasoplegic, IVF, wean pressors as tolerated  - CT with pna and aspiration, c/w abx, sputum cx, MRSA PCR, urine legionella  - Start bronchodilators likely with COPD, c/w vent, adjusted as needed  - CT more consistent with pna vs covid pna. C/W decadron  - CHIARA- 2/2 to ATN, continue to monitor Cr.   - Trend lactate  - DVT ppx - on SQH  - GOC- D/w wife, is full code. Daughter who is an RN also aware    D/W micu team, daughter at bedside.       Patient is critically ill, requiring critical care services.    Attending: I have personally and independently provided 75 minutes of critical care services.  This excludes any time spent on separate procedures or teaching

## 2023-01-14 NOTE — SWALLOW BEDSIDE ASSESSMENT ADULT - SWALLOW EVAL: DIAGNOSIS
Patient given puree via teaspoon at which time patient exhibited reduced stripping off teaspoon with mild anterior spillage from oral cavity and with reduced mastication and no attempt to transfer bolus. SLP manually removed bolus via teaspoon with adequate clearance. Pharyngeal phase could not be adequately assessed given severity of oral phase.

## 2023-01-14 NOTE — PROGRESS NOTE ADULT - PROBLEM SELECTOR PLAN 5
# CHIARA on CKD  Cr 2.8, baseline was 2.1 in 12/2022 per Nephrology   UA without evidence of infection, trace protein  will send for urine studies   renal/bladder US  monitor I/Os

## 2023-01-14 NOTE — PROGRESS NOTE ADULT - PROBLEM SELECTOR PLAN 6
D: AVR & MVR - Bioprosthetic & CABG X1    I: Monitored vitals and assessed pt status.   Changed: Temp PM insertion  Running: DL PICC SL  PRN: Dialudid 0.5mg X 1, neck pain  Tele: Junctional   O2: RA  Mobility: Independent    A: A0x4. VSS. Afebrile. Urinating adequately. Pt voices irritation with needing a temp PM inserted today. Friends came to visit after procedure and pt seemed to be in better spirits.   Resting in room most of day. Makes needs known.     P: Continue to monitor Pt status and report changes to CVTS.    Temp:  [98  F (36.7  C)-98.4  F (36.9  C)] 98  F (36.7  C)  Pulse:  [58-62] 58  Heart Rate:  [48-71] 51  Resp:  [14-18] 16  BP: ()/(53-76) 95/76  SpO2:  [92 %-99 %] 93 %   No TTE on file   family unsure name of cardiologist, PCP is Dr. Kennedy Godwin  TTE pedning   holding home entresto, and torsemide in setting of CHIARA  holding carvedilol as patient NPO

## 2023-01-14 NOTE — PROGRESS NOTE ADULT - PROBLEM SELECTOR PLAN 4
Toxic metabolic encephalopathy in setting of Alzheimer  CTH ordered   continue to treat infection as above   on Aricept at home, holding while NPO

## 2023-01-15 NOTE — PROGRESS NOTE ADULT - ASSESSMENT
82M DM, CAD, CHF, Dementia, CKD  bibems accompanied by daughter after a fall this evening. Nephrology consulted for acute on ckd    CHIARA on CKD stage 4  baseline ~ 2.1 12/2022  CHIARA possible ATN vs prerenal  renal function slightly better, patient making less urine,  now with covid  diuretic ACE on hold  ivf as ordered  Feurea <35% suggestive of prerenal  pending renal us  monitor bmp and urine output  avoid nephrotoxic agents  Will obtain consent to dialyze from family    proteinuria  mild  urine p/c ratio 0.2  not significant     acidosis  lactic acidosis improving   improving  monitor    covid/pna  care per team     82M DM, CAD, CHF, Dementia, CKD  bibems accompanied by daughter after a fall this evening. Nephrology consulted for acute on ckd    CHIARA on CKD stage 4  baseline ~ 2.1 12/2022  CHIARA possible ATN vs prerenal  renal function slightly better, patient making less urine,  now with covid  diuretic ACE on hold  ivf as ordered  Feurea <35% suggestive of prerenal  pending renal us  monitor bmp and urine output  avoid nephrotoxic agents  As per discussion regarding HD with family and ICU providers.  Patient is not a candidate for HD at this time    proteinuria  mild  urine p/c ratio 0.2  not significant     acidosis  lactic acidosis improving   improving  monitor    covid/pna  care per team     82M DM, CAD, CHF, Dementia, CKD  bibems accompanied by daughter after a fall this evening. Nephrology consulted for acute on ckd    CHIARA on CKD stage 4  baseline ~ 2.1 12/2022  CHIARA possible ATN vs prerenal  renal function slightly better, patient making less urine,  now with covid  diuretic ACE on hold  ivf as ordered  Feurea <35% suggestive of prerenal  pending renal us  monitor bmp and urine output  avoid nephrotoxic agents  As per discussion regarding HD with family and ICU providers.  Patient is on multiple vasopressure-d/w wife and daughter and they don't want him to go through dialysis    proteinuria  mild  urine p/c ratio 0.2  not significant     acidosis  lactic acidosis improving   improving  monitor    covid/pna  care per team

## 2023-01-15 NOTE — DIETITIAN INITIAL EVALUATION ADULT - ENTERAL
--- Increase Nepro by 10mL q 4hrs to goal of 35mL/hr + 2 packets NoCarb Prosource per day    provides:  840mL total volume  1512 kcals  68g protein (+ 30g additional protein via NoCarb Prosource)= 98g total protein   611mL free water

## 2023-01-15 NOTE — PROCEDURE NOTE - NSINDICATIONS_GEN_A_CORE
arterial puncture to obtain ABG's/blood sampling/critical patient/monitoring purposes
arterial puncture to obtain ABG's/blood sampling/cannulation purposes/critical patient/monitoring purposes
critical illness/hypertonic/irritant infusion/venous access/volume resuscitation

## 2023-01-15 NOTE — DIETITIAN INITIAL EVALUATION ADULT - OTHER INFO
Pt. remains intubated/sedated.  At rate of 13.7mL/hr Propofol which over 24hrs will provide ~362 non-nutritive lipid kcals.  Pt. initiated (today 1/15) on TF w Graciero observed @ 10mL/hr.  Pt. without any noted/reported intolerance to TF @ this time (no vomiting/diarrhea/constipation or abdominal distention).

## 2023-01-15 NOTE — CHART NOTE - NSCHARTNOTEFT_GEN_A_CORE
: Dr. Preston, dr. De La Torre    INDICATION: Shock, respiratory failure    PROCEDURE:  [x] LIMITED ECHO  [ ] LIMITED CHEST  [ ] LIMITED RETROPERITONEAL  [ ] LIMITED ABDOMINAL  [ ] LIMITED DVT  [ ] NEEDLE GUIDANCE VASCULAR  [ ] NEEDLE GUIDANCE THORACENTESIS  [ ] NEEDLE GUIDANCE PARACENTESIS  [ ] NEEDLE GUIDANCE PERICARDIOCENTESIS  [ ] OTHER    FINDINGS:  moderate LV systolic function, RV< LV, IVC not small     INTERPRETATION:  moderate cardiac dysfunction, unlikely fluid responsive       Images uploaded to [employer] : Dr. Preston, dr. De La Torre    INDICATION: Shock, respiratory failure    PROCEDURE:  [x] LIMITED ECHO  [ ] LIMITED CHEST  [ ] LIMITED RETROPERITONEAL  [ ] LIMITED ABDOMINAL  [ ] LIMITED DVT  [ ] NEEDLE GUIDANCE VASCULAR  [ ] NEEDLE GUIDANCE THORACENTESIS  [ ] NEEDLE GUIDANCE PARACENTESIS  [ ] NEEDLE GUIDANCE PERICARDIOCENTESIS  [ ] OTHER    FINDINGS:  moderate LV systolic function, RV< LV, IVC not small     INTERPRETATION:  moderate cardiac dysfunction, unlikely fluid responsive       Images uploaded to [employer]      Attending note :  I was present for the duration of the procedure and supervised as needed.

## 2023-01-15 NOTE — PROGRESS NOTE ADULT - SUBJECTIVE AND OBJECTIVE BOX
INTERVAL HPI/OVERNIGHT EVENTS:    SUBJECTIVE: Patient seen and examined at bedside.       VITAL SIGNS:  ICU Vital Signs Last 24 Hrs  T(C): 36.7 (15 Anthony 2023 04:00), Max: 37.3 (14 Jan 2023 10:45)  T(F): 98 (15 Anthony 2023 04:00), Max: 99.2 (14 Jan 2023 12:45)  HR: 89 (15 Anthony 2023 06:00) (87 - 117)  BP: 100/60 (15 Anthony 2023 04:00) (54/47 - 139/85)  BP(mean): 73 (15 Anthony 2023 04:00) (50 - 100)  ABP: 88/49 (15 Anthony 2023 06:00) (65/43 - 96/55)  ABP(mean): 61 (15 Anthony 2023 06:00) (50 - 68)  RR: 26 (15 Anthony 2023 06:00) (15 - 47)  SpO2: 98% (15 Anthony 2023 06:00) (91% - 100%)    O2 Parameters below as of 15 Anthony 2023 06:00  Patient On (Oxygen Delivery Method): ventilator    O2 Concentration (%): 70      Mode: AC/ CMV (Assist Control/ Continuous Mandatory Ventilation), RR (machine): 26, TV (machine): 500, FiO2: 70, PEEP: 10, ITime: 0.82, MAP: 18, PIP: 36  Plateau pressure:   P/F ratio:     01-14 @ 07:01  -  01-15 @ 06:49  --------------------------------------------------------  IN: 4807 mL / OUT: 145 mL / NET: 4662 mL      CAPILLARY BLOOD GLUCOSE      POCT Blood Glucose.: 203 mg/dL (15 Anthony 2023 05:15)    ECG:    PHYSICAL EXAM:    General:   HEENT:   Neck:   Respiratory:   Cardiovascular:   Abdomen:   Extremities:  Neurological:    MEDICATIONS:  MEDICATIONS  (STANDING):  atorvastatin 20 milliGRAM(s) Oral at bedtime  azithromycin  IVPB      azithromycin  IVPB 500 milliGRAM(s) IV Intermittent every 24 hours  budesonide  80 MICROgram(s)/formoterol 4.5 MICROgram(s) Inhaler 2 Puff(s) Inhalation two times a day  chlorhexidine 2% Cloths 1 Application(s) Topical daily  dexAMETHasone  Injectable 6 milliGRAM(s) IV Push daily  dextrose 5%. 1000 milliLiter(s) (100 mL/Hr) IV Continuous <Continuous>  dextrose 5%. 1000 milliLiter(s) (50 mL/Hr) IV Continuous <Continuous>  dextrose 50% Injectable 25 Gram(s) IV Push once  dextrose 50% Injectable 12.5 Gram(s) IV Push once  dextrose 50% Injectable 25 Gram(s) IV Push once  glucagon  Injectable 1 milliGRAM(s) IntraMuscular once  heparin   Injectable 5000 Unit(s) SubCutaneous every 8 hours  insulin lispro (ADMELOG) corrective regimen sliding scale   SubCutaneous every 6 hours  lactated ringers. 1000 milliLiter(s) (75 mL/Hr) IV Continuous <Continuous>  midodrine. 30 milliGRAM(s) Oral three times a day  norepinephrine Infusion 0.05 MICROgram(s)/kG/Min (7.13 mL/Hr) IV Continuous <Continuous>  pantoprazole  Injectable 40 milliGRAM(s) IV Push daily  piperacillin/tazobactam IVPB.. 3.375 Gram(s) IV Intermittent every 12 hours  propofol Infusion 65.789 MICROgram(s)/kG/Min (30 mL/Hr) IV Continuous <Continuous>  sodium bicarbonate  Infusion 0.197 mEq/kG/Hr (100 mL/Hr) IV Continuous <Continuous>  vasopressin Infusion 0.04 Unit(s)/Min (6 mL/Hr) IV Continuous <Continuous>    MEDICATIONS  (PRN):  acetaminophen     Tablet .. 650 milliGRAM(s) Oral every 6 hours PRN Temp greater or equal to 38C (100.4F), Mild Pain (1 - 3), Moderate Pain (4 - 6)  dextrose Oral Gel 15 Gram(s) Oral once PRN Blood Glucose LESS THAN 70 milliGRAM(s)/deciliter      ALLERGIES:  Allergies    No Known Allergies    Intolerances        LABS:                        10.3   5.58  )-----------( 41       ( 15 Anthony 2023 01:12 )             32.1     01-15    135  |  99  |  52<H>  ----------------------------<  160<H>  3.9   |  15<L>  |  4.33<H>    Ca    6.8<L>      15 Anthony 2023 01:12  Phos  8.0     01-15  Mg     1.70     01-15    TPro  5.4<L>  /  Alb  2.4<L>  /  TBili  1.3<H>  /  DBili  x   /  AST  94<H>  /  ALT  19  /  AlkPhos  28<L>  01-15    PT/INR - ( 14 Jan 2023 04:45 )   PT: 16.4 sec;   INR: 1.41 ratio         PTT - ( 14 Jan 2023 04:45 )  PTT:55.5 sec      RADIOLOGY & ADDITIONAL TESTS: Reviewed.   INTERVAL HPI/OVERNIGHT EVENTS: overnight patient's acidosis got worse. bicarb given, pressers increased, central line placed, vent setting changed. patient also desaturated and was found to have water in tubing. not making a lot of urine.      SUBJECTIVE: Patient seen and examined at bedside.       VITAL SIGNS:  ICU Vital Signs Last 24 Hrs  T(C): 36.7 (15 Anthony 2023 04:00), Max: 37.3 (14 Jan 2023 10:45)  T(F): 98 (15 Anthony 2023 04:00), Max: 99.2 (14 Jan 2023 12:45)  HR: 89 (15 Anthony 2023 06:00) (87 - 117)  BP: 100/60 (15 Anthony 2023 04:00) (54/47 - 139/85)  BP(mean): 73 (15 Anthony 2023 04:00) (50 - 100)  ABP: 88/49 (15 Anthony 2023 06:00) (65/43 - 96/55)  ABP(mean): 61 (15 Anthony 2023 06:00) (50 - 68)  RR: 26 (15 Anthony 2023 06:00) (15 - 47)  SpO2: 98% (15 Anthony 2023 06:00) (91% - 100%)    O2 Parameters below as of 15 Anthony 2023 06:00  Patient On (Oxygen Delivery Method): ventilator    O2 Concentration (%): 70      Mode: AC/ CMV (Assist Control/ Continuous Mandatory Ventilation), RR (machine): 26, TV (machine): 500, FiO2: 70, PEEP: 10, ITime: 0.82, MAP: 18, PIP: 36  Plateau pressure:   P/F ratio:     01-14 @ 07:01  -  01-15 @ 06:49  --------------------------------------------------------  IN: 4807 mL / OUT: 145 mL / NET: 4662 mL      CAPILLARY BLOOD GLUCOSE      POCT Blood Glucose.: 203 mg/dL (15 Anthony 2023 05:15)    ECG:    PHYSICAL EXAM:  GENERAL: intubated, recently off sedation  HEAD: Atraumatic, Normocephalic  EYES: EOMI, conjunctiva and sclera clear  ENT: Moist mucous membranes  NECK: Supple, No JVD  CHEST/LUNG: + B/l rales, rhonchi. No wheezing or rubs. Synchronous with vent  HEART: Regular rate and rhythm; No murmurs, rubs, or gallops  ABDOMEN: Bowel sounds present; Soft, Nontender, Nondistended.  EXTREMITIES: +1 pitting edema b/l LE and UE. 2+ Peripheral Pulses, brisk capillary refill. No clubbing, cyanosis.  NERVOUS SYSTEM:  intubated  SKIN: No rashes or lesions    MEDICATIONS:  MEDICATIONS  (STANDING):  atorvastatin 20 milliGRAM(s) Oral at bedtime  azithromycin  IVPB      azithromycin  IVPB 500 milliGRAM(s) IV Intermittent every 24 hours  budesonide  80 MICROgram(s)/formoterol 4.5 MICROgram(s) Inhaler 2 Puff(s) Inhalation two times a day  chlorhexidine 2% Cloths 1 Application(s) Topical daily  dexAMETHasone  Injectable 6 milliGRAM(s) IV Push daily  dextrose 5%. 1000 milliLiter(s) (100 mL/Hr) IV Continuous <Continuous>  dextrose 5%. 1000 milliLiter(s) (50 mL/Hr) IV Continuous <Continuous>  dextrose 50% Injectable 25 Gram(s) IV Push once  dextrose 50% Injectable 12.5 Gram(s) IV Push once  dextrose 50% Injectable 25 Gram(s) IV Push once  glucagon  Injectable 1 milliGRAM(s) IntraMuscular once  heparin   Injectable 5000 Unit(s) SubCutaneous every 8 hours  insulin lispro (ADMELOG) corrective regimen sliding scale   SubCutaneous every 6 hours  lactated ringers. 1000 milliLiter(s) (75 mL/Hr) IV Continuous <Continuous>  midodrine. 30 milliGRAM(s) Oral three times a day  norepinephrine Infusion 0.05 MICROgram(s)/kG/Min (7.13 mL/Hr) IV Continuous <Continuous>  pantoprazole  Injectable 40 milliGRAM(s) IV Push daily  piperacillin/tazobactam IVPB.. 3.375 Gram(s) IV Intermittent every 12 hours  propofol Infusion 65.789 MICROgram(s)/kG/Min (30 mL/Hr) IV Continuous <Continuous>  sodium bicarbonate  Infusion 0.197 mEq/kG/Hr (100 mL/Hr) IV Continuous <Continuous>  vasopressin Infusion 0.04 Unit(s)/Min (6 mL/Hr) IV Continuous <Continuous>    MEDICATIONS  (PRN):  acetaminophen     Tablet .. 650 milliGRAM(s) Oral every 6 hours PRN Temp greater or equal to 38C (100.4F), Mild Pain (1 - 3), Moderate Pain (4 - 6)  dextrose Oral Gel 15 Gram(s) Oral once PRN Blood Glucose LESS THAN 70 milliGRAM(s)/deciliter      ALLERGIES:  Allergies    No Known Allergies    Intolerances        LABS:                        10.3   5.58  )-----------( 41       ( 15 Anthony 2023 01:12 )             32.1     01-15    135  |  99  |  52<H>  ----------------------------<  160<H>  3.9   |  15<L>  |  4.33<H>    Ca    6.8<L>      15 Anthony 2023 01:12  Phos  8.0     01-15  Mg     1.70     01-15    TPro  5.4<L>  /  Alb  2.4<L>  /  TBili  1.3<H>  /  DBili  x   /  AST  94<H>  /  ALT  19  /  AlkPhos  28<L>  01-15    PT/INR - ( 14 Jan 2023 04:45 )   PT: 16.4 sec;   INR: 1.41 ratio         PTT - ( 14 Jan 2023 04:45 )  PTT:55.5 sec      RADIOLOGY & ADDITIONAL TESTS: Reviewed.

## 2023-01-15 NOTE — PROCEDURE NOTE - NSPROCDETAILS_GEN_ALL_CORE
guidewire recovered/lumen(s) aspirated and flushed/sterile dressing applied/sterile technique, catheter placed/ultrasound guidance with use of sterile gel and probe cove
location identified, draped/prepped, sterile technique used, needle inserted/introduced/positive blood return obtained via catheter/connected to a pressurized flush line/sutured in place/hemostasis with direct pressure, dressing applied/Seldinger technique/all materials/supplies accounted for at end of procedure
location identified, draped/prepped, sterile technique used, needle inserted/introduced/positive blood return obtained via catheter/connected to a pressurized flush line/sutured in place/all materials/supplies accounted for at end of procedure

## 2023-01-15 NOTE — DIETITIAN INITIAL EVALUATION ADULT - PERTINENT LABORATORY DATA
01-15    135  |  96<L>  |  55<H>  ----------------------------<  364<H>  4.3   |  16<L>  |  4.34<H>    Ca    6.4<LL>      15 Anthony 2023 11:45  Phos  8.2     01-15  Mg     2.20     01-15    TPro  5.2<L>  /  Alb  2.2<L>  /  TBili  1.6<H>  /  DBili  1.5<H>  /  AST  97<H>  /  ALT  22  /  AlkPhos  40  01-15    CAPILLARY BLOOD GLUCOSE      POCT Blood Glucose.: 351 mg/dL (15 Anthony 2023 11:44)  POCT Blood Glucose.: 203 mg/dL (15 Anthony 2023 05:15)  POCT Blood Glucose.: 66 mg/dL (15 Anthony 2023 05:12)  POCT Blood Glucose.: 99 mg/dL (14 Jan 2023 23:53)  POCT Blood Glucose.: 139 mg/dL (14 Jan 2023 18:05)      A1C with Estimated Average Glucose Result: 6.3 % (01-14-23 @ 04:45)

## 2023-01-15 NOTE — PROGRESS NOTE ADULT - ASSESSMENT
82M with PMH HTN, DM2, HLD, Alzheimer's dementia (AAO2-3 at baseline) who is presenting to the ED with acute hypoxic respiratory failure 2/2 COVID-19 PNA, complicated by AMS, admitted to MICU due to progression of hypoxemia and AMS, now intubated and sedated.    NEURO:  #Mental status: A/Ox1 on admission to hospital. baseline A/Ox3.   -CT head negative on admission. repeat CT head done 1/14 prior to MICU transfer with no acute changes.  -likely 2/2 metabolic encephalopathia i/s/o new COVID19 pna and potential structural lesion, must r/o with CT head     CV:  #hemodynamically unstable due to shock  hypovolemic vs vasoplegic shock requiring levophed.  -Trend I/Os and daily weights, and Cr  -IL bolus    #history of HF  -diastolic disfunction grade 1-2 on point of care ultrasound, pending echo  -ordered official echo  -holding home entresto, and torsemide in setting of CHIARA  -holding carvedilol in setting of hypotension     #HLD (hyperlipidemia).   -c/w home atovastatin 20 mg- will give meds    #HTN (hypertension)  -BP low on admission and patient is currently in shock and on levophed  -holding home carvedilol, Entresto, amlodipine in setting of sepsis  continue to monitor BP.    #tropenemia- 54  -likely 2/2 CHIARA  -no ST segment elevation, depressions, or signs of ischemia  -will trend cardiac enzymes  -on tele    PULM:  #COVID pna  -acute hypoxic respiratory failure/ sepsis 2/2 covid pna  - PCR positive 1/13, home test positive 1/11  -holding remdesivir in setting of kidney failure  -c/w dexamethasone 6mg (1/13-)  -patient is intubated on fio2 70, rate 26, volume 450, peep 8.     #multifocal pna  -2/2 aspiration pna  -CT chest: Multifocal consolidations most predominant in the left upper and right  lower lobes which may be due to infection. Additional possibility of a prior aspiration event should also be entertained given that there is   extensive endobronchial secretions and impaction in the dependent posterior portion of the right lower lobe and dependent portions of the right upper lobe.  -on Zosyn (1/13-), azithro (1/14-), and vanc (1/13-1/14)  -pending MRSA swab, legionella, sputum culture  -Bcx from 1/13 NGTD    #emphysema  -emphysematous seen on CT chest by MICU team   -started Symbicort BID     =====GI=====  #GI Prophylaxis  - Protonix 40mg IV QD    #Diet  - NPO with meds    =====Renal/=====  #Electrolytes  - Maintain K>4, Phos>3, Mag>2, iCal>1    =====Endocrine=====  #DM2  - While NPO, FSBG and ROLAND q6h  -pending A1c  -glucose here has been >200  on Farxiga at home, unclear if for CHF vs DM     #Acute metabolic encephalopathy.   -Toxic metabolic encephalopathy in setting of Alzheimer  -continue to treat infection as outlined in ID  -on Aricept at home, holding while NPO.    #CHIARA on CKD  -baseline was 2.1 in 12/2022 per Nephrology   UA without evidence of infection  -will continue to monitor and trend.  -trending upwards  -monitor I/Os    =====Infectious Disease=====  Patient is afebrile with no leukocytosis  #multifocal pna  --on Zosyn (1/13-), azithro (1/14-), and vanc (1/13-1/14)  -pending MRSA swab, legionella, sputum Cx    -Bcx from 1/13 NGTD     #covid pna  -on azithro (1-14-)  -c/w dexamethasone 6mg (1/13-)    =====Heme/Onc=====  #DVT Phrophylaxis  - heparin subcutaneous 500 q8 82M with PMH HTN, DM2, HLD, Alzheimer's dementia (AAO2-3 at baseline) who is presenting to the ED with acute hypoxic respiratory failure 2/2 COVID-19 PNA, complicated by AMS, admitted to MICU due to progression of hypoxemia and AMS, now intubated and sedated.    NEURO:  #Mental status: A/Ox1 on admission to hospital. baseline A/Ox3.   -CT head negative on admission. repeat CT head done 1/14 prior to MICU transfer with no acute changes.  -likely 2/2 metabolic encephalopathia i/s/o new COVID19 pna and potential structural lesion, must r/o with CT head     CV:  #hemodynamically unstable due to shock  vasoplegic shock requiring 3 pressers.  -Trend I/Os and daily weights, and Cr  -IL bolus    #history of HF  -diastolic disfunction grade 1-2 on point of care ultrasound, pending echo  -ordered official echo  -holding home entresto, and torsemide in setting of CHIARA  -holding carvedilol in setting of hypotension     #HLD (hyperlipidemia).   -c/w home atovastatin 20 mg- will give meds    #HTN (hypertension)  -BP low on admission and patient is currently in shock and on levophed  -holding home carvedilol, Entresto, amlodipine in setting of sepsis  continue to monitor BP.    #tropenemia- 54  -likely 2/2 CHIARA  -no ST segment elevation, depressions, or signs of ischemia  -cardiac enzymes- downtrending  -on tele    PULM:  #COVID pna  -acute hypoxic respiratory failure/ sepsis 2/2 covid pna  - PCR positive 1/13, home test positive 1/11  -holding remdesivir in setting of kidney failure  -c/w dexamethasone 6mg (1/13-)  -patient is intubated    #multifocal pna  -2/2 aspiration pna  -CT chest: Multifocal consolidations most predominant in the left upper and right  lower lobes which may be due to infection. Additional possibility of a prior aspiration event should also be entertained given that there is   extensive endobronchial secretions and impaction in the dependent posterior portion of the right lower lobe and dependent portions of the right upper lobe.  -on Zosyn (1/13-1/15), changed to paige (1/15-), azithro (1/14-), and vanc by level (1/13-)  -vanc by level @9pm  -pending MRSA swab, legionella, sputum culture  -Bcx from 1/13 NGTD    #emphysema  -emphysematous seen on CT chest by MICU team   -started Symbicort BID     =====GI=====  #GI Prophylaxis  - Protonix 40mg IV QD    #Diet  - NPO with meds    =====Renal/=====  #Electrolytes  - Maintain K>4, Phos>3, Mag>2, iCal>1    # elevated bili  -lipase pending  -u/s kidneys, blader and RUQ pending  =====Endocrine=====  #DM2  - While NPO, FSBG and MISS q6h  - A1c:6.3%  -glucose here has been >200  on Farxiga at home, unclear if for CHF vs DM     #Acute metabolic encephalopathy.   -Toxic metabolic encephalopathy in setting of Alzheimer  -continue to treat infection as outlined in ID  -on Aricept at home, holding while NPO.    #CHIARA on CKD  -baseline was 2.1 in 12/2022 per Nephrology   UA without evidence of infection  -will continue to monitor and trend.  -trending upwards, due to presser requirements and hypovolemia will hold HD for now.   -monitor I/Os- not making urine.    =====Infectious Disease=====  Patient is afebrile with no leukocytosis  #multifocal pna  --on paige (1/15-), dc-ed zosyn azithro (1/14-), and vanc by level (1/13-)  -vanc by level @9pm  -pending MRSA swab, legionella, sputum Cx    -Bcx from 1/13 NGTD     #covid pna  -on azithro (1-14-)  -c/w dexamethasone 6mg (1/13-)    =====Heme/Onc=====  #DVT Phrophylaxis  - heparin subcutaneous 500 q8- will hold due to thrombocytopenia  -SCDs    #thrombocytopenia  -will monitor with CBC

## 2023-01-15 NOTE — DIETITIAN INITIAL EVALUATION ADULT - PERTINENT MEDS FT
MEDICATIONS  (STANDING):  atorvastatin 20 milliGRAM(s) Oral at bedtime  azithromycin  IVPB      azithromycin  IVPB 500 milliGRAM(s) IV Intermittent every 24 hours  budesonide  80 MICROgram(s)/formoterol 4.5 MICROgram(s) Inhaler 2 Puff(s) Inhalation two times a day  chlorhexidine 2% Cloths 1 Application(s) Topical daily  dexAMETHasone  Injectable 6 milliGRAM(s) IV Push daily  dextrose 5%. 1000 milliLiter(s) (100 mL/Hr) IV Continuous <Continuous>  dextrose 5%. 1000 milliLiter(s) (50 mL/Hr) IV Continuous <Continuous>  dextrose 50% Injectable 25 Gram(s) IV Push once  dextrose 50% Injectable 12.5 Gram(s) IV Push once  dextrose 50% Injectable 25 Gram(s) IV Push once  glucagon  Injectable 1 milliGRAM(s) IntraMuscular once  insulin lispro (ADMELOG) corrective regimen sliding scale   SubCutaneous every 6 hours  meropenem  IVPB      meropenem  IVPB 500 milliGRAM(s) IV Intermittent every 12 hours  norepinephrine Infusion 0.05 MICROgram(s)/kG/Min (3.56 mL/Hr) IV Continuous <Continuous>  pantoprazole  Injectable 40 milliGRAM(s) IV Push daily  phenylephrine    Infusion 0.1 MICROgram(s)/kG/Min (2.85 mL/Hr) IV Continuous <Continuous>  propofol Infusion 65.789 MICROgram(s)/kG/Min (30 mL/Hr) IV Continuous <Continuous>  sodium bicarbonate  Infusion 0.197 mEq/kG/Hr (100 mL/Hr) IV Continuous <Continuous>  vasopressin Infusion 0.04 Unit(s)/Min (6 mL/Hr) IV Continuous <Continuous>    MEDICATIONS  (PRN):  acetaminophen     Tablet .. 650 milliGRAM(s) Oral every 6 hours PRN Temp greater or equal to 38C (100.4F), Mild Pain (1 - 3), Moderate Pain (4 - 6)  dextrose Oral Gel 15 Gram(s) Oral once PRN Blood Glucose LESS THAN 70 milliGRAM(s)/deciliter

## 2023-01-15 NOTE — PROGRESS NOTE ADULT - SUBJECTIVE AND OBJECTIVE BOX
[facility] [place] [facility]   MD CATRINA ZUÑIGA MD RUORU WONG, PA KRISTINE SOLTANPOUR, DO    TEL:  OFFICE: [phone]      RENAL FOLLOW UP NOTE-- Date of Service ;; 01-15-23 @ 13:26  --------------------------------------------------------------------------------  HPI:  Pt seen and examined at bedside  Orally intubated, sedated.         PAST HISTORY  --------------------------------------------------------------------------------  No significant changes to PMH, PSH, FHx, SHx, unless otherwise noted    ALLERGIES & MEDICATIONS  --------------------------------------------------------------------------------  Allergies    No Known Allergies    Intolerances      Standing Inpatient Medications  atorvastatin 20 milliGRAM(s) Oral at bedtime  azithromycin  IVPB      azithromycin  IVPB 500 milliGRAM(s) IV Intermittent every 24 hours  budesonide  80 MICROgram(s)/formoterol 4.5 MICROgram(s) Inhaler 2 Puff(s) Inhalation two times a day  chlorhexidine 2% Cloths 1 Application(s) Topical daily  dexAMETHasone  Injectable 6 milliGRAM(s) IV Push daily  dextrose 5%. 1000 milliLiter(s) IV Continuous <Continuous>  dextrose 5%. 1000 milliLiter(s) IV Continuous <Continuous>  dextrose 50% Injectable 25 Gram(s) IV Push once  dextrose 50% Injectable 12.5 Gram(s) IV Push once  dextrose 50% Injectable 25 Gram(s) IV Push once  glucagon  Injectable 1 milliGRAM(s) IntraMuscular once  insulin lispro (ADMELOG) corrective regimen sliding scale   SubCutaneous every 6 hours  meropenem  IVPB      meropenem  IVPB 500 milliGRAM(s) IV Intermittent every 12 hours  norepinephrine Infusion 0.05 MICROgram(s)/kG/Min IV Continuous <Continuous>  pantoprazole  Injectable 40 milliGRAM(s) IV Push daily  phenylephrine    Infusion 0.1 MICROgram(s)/kG/Min IV Continuous <Continuous>  propofol Infusion 65.789 MICROgram(s)/kG/Min IV Continuous <Continuous>  sodium bicarbonate  Infusion 0.197 mEq/kG/Hr IV Continuous <Continuous>  vasopressin Infusion 0.04 Unit(s)/Min IV Continuous <Continuous>    PRN Inpatient Medications  acetaminophen     Tablet .. 650 milliGRAM(s) Oral every 6 hours PRN  dextrose Oral Gel 15 Gram(s) Oral once PRN      REVIEW OF SYSTEMS  --------------------------------------------------------------------------------  General: no fever  RESP: no cough, orally intubated  MSK: no edema     VITALS/PHYSICAL EXAM  --------------------------------------------------------------------------------  T(C): 36.7 (01-15-23 @ 12:00), Max: 36.9 (01-14-23 @ 16:00)  HR: 82 (01-15-23 @ 12:00) (77 - 115)  BP: 100/60 (01-15-23 @ 04:00) (54/47 - 139/85)  RR: 28 (01-15-23 @ 12:00) (16 - 47)  SpO2: 100% (01-15-23 @ 12:00) (60% - 100%)  Wt(kg): --  Height (cm): 177.8 (01-15-23 @ 03:00)  Weight (kg): 76 (01-14-23 @ 06:02)  BMI (kg/m2): 24 (01-15-23 @ 03:00)  BSA (m2): 1.94 (01-15-23 @ 03:00)      01-14-23 @ 07:01  -  01-15-23 @ 07:00  --------------------------------------------------------  IN: 5036.1 mL / OUT: 150 mL / NET: 4886.1 mL    01-15-23 @ 07:01  -  01-15-23 @ 13:26  --------------------------------------------------------  IN: 1067.5 mL / OUT: 30 mL / NET: 1037.5 mL      Physical Exam:  	Gen: NAD  	Pulm: Orally intubated  	CV: S1S2  	Abd: Soft, +BS  	Ext: No LE edema B/L                      Neuro: Sedated  	Skin: Warm and Dry   	Vascular access: none           JOSÉ kaufman    LABS/STUDIES  --------------------------------------------------------------------------------              9.5    8.58  >-----------<  24       [01-15-23 @ 11:45]              30.7     135  |  96  |  55  ----------------------------<  364      [01-15-23 @ 11:45]  4.3   |  16  |  4.34        Ca     6.4     [01-15-23 @ 11:45]      iCa    0.77     [01-15 @ 11:45]      Mg     2.20     [01-15-23 @ 11:45]      Phos  8.2     [01-15-23 @ 11:45]    TPro  5.2  /  Alb  2.2  /  TBili  1.6  /  DBili  x   /  AST  97  /  ALT  22  /  AlkPhos  40  [01-15-23 @ 11:45]    PT/INR: PT 14.7 , INR 1.26       [01-15-23 @ 11:45]  PTT: 76.7       [01-15-23 @ 11:45]    CK 3341      [01-14-23 @ 17:48]        [01-14-23 @ 04:45]    Creatinine Trend:  SCr 4.34 [01-15 @ 11:45]  SCr 4.59 [01-15 @ 06:00]  SCr 4.33 [01-15 @ 01:12]  SCr 4.34 [01-14 @ 14:00]  SCr 3.87 [01-14 @ 04:45]    Urinalysis - [01-13-23 @ 05:34]      Color Yellow / Appearance Slightly Turbid / SG 1.015 / pH 5.5      Gluc Negative / Ketone Negative  / Bili Negative / Urobili <2 mg/dL       Blood Negative / Protein 30 mg/dL / Leuk Est Negative / Nitrite Negative      RBC 2 / WBC 1 / Hyaline 6 / Gran  / Sq Epi  / Non Sq Epi 2 / Bacteria Negative    Urine Creatinine 189      [01-13-23 @ 18:41]  Urine Protein 30      [01-13-23 @ 18:41]  Urine Sodium 31      [01-13-23 @ 18:41]  Urine Urea Nitrogen 527.0      [01-13-23 @ 18:41]    Ferritin 612      [01-14-23 @ 04:45]  HbA1c 7.0      [05-26-18 @ 16:34]  TSH 0.59      [01-14-23 @ 14:00]  Lipid: chol 60, , HDL 36, LDL --      [01-14-23 @ 04:45]

## 2023-01-15 NOTE — DIETITIAN INITIAL EVALUATION ADULT - NS FNS DIET ORDER
Diet, NPO with Tube Feed:   Tube Feeding Modality: Orogastric  Nepro with Carb Steady (NEPRORTH)  Total Volume for 24 Hours (mL): 240  Continuous  Starting Tube Feed Rate {mL per Hour}: 10  Increase Tube Feed Rate by (mL): 0     Every 4 hours  Until Goal Tube Feed Rate (mL per Hour): 10  Tube Feed Duration (in Hours): 24  Tube Feed Start Time: 10:00 (01-15-23 @ 10:01)    provides:  432 kcals & 19g protein

## 2023-01-15 NOTE — DIETITIAN INITIAL EVALUATION ADULT - REASON FOR ADMISSION
81yo M w Hx of dementia, presenting s/p fall.  COVID+.  Now s/p RRT for AMS and hypoxemia requiring intubation.

## 2023-01-15 NOTE — DIETITIAN INITIAL EVALUATION ADULT - NSICDXPASTMEDICALHX_GEN_ALL_CORE_FT
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    Glaucoma     HLD (hyperlipidemia)     [place] (hard of hearing)

## 2023-01-15 NOTE — DIETITIAN INITIAL EVALUATION ADULT - ORAL INTAKE PTA/DIET HISTORY
Spoke briefly with daughter, who states Pt. had generally good appetite/PO intake up until recently PTA.  Time period not specified, however.

## 2023-01-16 NOTE — PROGRESS NOTE ADULT - ASSESSMENT
====================ASSESSMENT======================  82M with HTN, DM2, HLD, Alzheimer's dementia who presented to the hospital initially for COVID PNA, also found to be in CHIARA s/p RRT for worsening AMS and hypoxemic respiratory failure, currently admitted to the MICU intubated and on m    Patient was admitted to the floors, Was having AMS as well as respiratory complaints. COVID PCR was positive started on dexamethasone on the floors. However, RRT called for worsening AMS and hypoxemic RF. Patient admitted to MICU. CT now showing acute findings, CT chest with signs of multifocal pna and likely aspiration as well as emphysema. Patient chronic smoker until several months ago.     Plan:  ====================NEUROLOGY=======================    ====================RESPIRATORY======================    ====================CARDIOVASCULAR==================    ====================/RENAL========================    ====================GI/NUTRITION=====================    ====================SKIN=============================    ====================INFECTIOUS DISEASE================    ====================ENDOCRINE=======================    ====================HEMATOLOGIC/DVT PPx=============    ====================ETHICS=========================== ====================ASSESSMENT======================  82M with HTN, DM2, HLD, Alzheimer's dementia who presented to the hospital initially for COVID PNA, also found to be in CHIARA s/p RRT for worsening AMS and hypoxemic respiratory failure, currently admitted to the MICU intubated and on multi-pressor support. CT chest w/ signs of multifocal PNA and likely aspiration as well as emphysema.    Plan:  ====================NEUROLOGY=======================  #Mental Status: AAOx1 on admission to hospital (baseline AAOx3)   - CT head negative on admission. Repeat CT head done (1/14) prior to MICU transfer with no acute changes.  - likely 2/2 metabolic encephalopathia i/s/o new COVID-19 pneumonia  - pt. currently intubated and sedated  - c/w propofol for sedation    ====================RESPIRATORY======================  #COVID PNA  - acute hypoxic respiratory failure/sepsis 2/2 COVID PNA   - PCR positive 1/13, home test positive 1/11  - holding Remdesivir in setting of kidney failure  - c/w dexamethasone 6mg qd (1/13- )  - pt. currently intubated and sedated  - c/w propofol for sedation    #Multifocal PNA   - BCx (1/13): NGTD, MRSA negative, legionella negative  - likely 2/2 aspiration PNA  - CT chest: multifocal consolidations most predominant in the left upper and right  lower lobes which may be due to infection. Additional possibility of a prior aspiration event should also be entertained given that there is extensive endobronchial secretions and impaction in the dependent posterior portion of the right lower lobe and dependent portions of the right upper lobe.  - s/p Zosyn (1/13-1/15), vancomycin (1/13-1/15; d/c'd as MRSA negative) and azithromycin (1/14-1/16; d/c'd as Legionella negative)  - c/w meropenem (1/15- )    #Emphysema  - seen on CT chest by MICU team, likely 2/2 hx of smoking  - c/w Symbicort BID     ====================CARDIOVASCULAR==================  #Multifactorial Shock  - vasoplegic shock i/s/o sepsis +/- mild cardiogenic component, currently on 3 pressors  - wean pressors as tolerated  - holding home BP and CHF meds as below  - monitor I/Os, daily weights, SCr, and urine output    #Hx of CHF  - diastolic disfunction grade 1-2 on POCUS  - TTE (1/15): calcified aortic valve, grossly moderate segmental LV systolic dysfunction, inferior and inferolateral wall hypokinesis. RV possibly hypokinetic   -holding home Entresto and torsemide in setting of CHIARA  - holding home carvedilol in setting of sepsis and hypotension     #HLD (hyperlipidemia).   - c/w atorvastatin 20 mg- will give meds    #HTN (hypertension)  - BP low on admission and patient is currently in shock and on multiple pressors  - holding home carvedilol, Entresto, amlodipine in setting of sepsis and hypotension  - continue to monitor BP    ====================/RENAL========================  #CHIARA on CKD  - baseline SCr 2.1 in 12/2022 per Nephrology. UA without evidence of infection, currently stabilizing  - appreciate nephrology recommendations- due to pressor requirements and hypovolemia, will hold off on HD for now  - monitor I/Os, daily weights, SCr, and urine output    ====================GI/NUTRITION=====================    ====================SKIN=============================    ====================INFECTIOUS DISEASE================    ====================ENDOCRINE=======================    ====================HEMATOLOGIC/DVT PPx=============    ====================ETHICS=========================== ====================ASSESSMENT======================  82M with HTN, DM2, HLD, Alzheimer's dementia who presented to the hospital initially for COVID PNA, also found to be in CHIARA s/p RRT for worsening AMS and hypoxemic respiratory failure, currently admitted to the MICU intubated and on multi-pressor support. CT chest w/ signs of multifocal PNA and likely aspiration as well as emphysema.    Plan:  ====================NEUROLOGY=======================  #Mental Status: AAOx1 on admission to hospital (baseline AAOx3)   - CT head negative on admission. Repeat CT head done (1/14) prior to MICU transfer with no acute changes.  - likely 2/2 metabolic encephalopathia i/s/o new COVID-19 pneumonia  - pt. currently intubated and sedated  - c/w propofol for sedation    ====================RESPIRATORY======================  #COVID PNA  - acute hypoxic respiratory failure/sepsis 2/2 COVID PNA   - PCR positive 1/13, home test positive 1/11  - holding Remdesivir in setting of kidney failure  - c/w dexamethasone 6mg qd (1/13- )  - pt. currently intubated and sedated  - c/w propofol for sedation    #Multifocal PNA   - BCx (1/13): NGTD, MRSA negative, legionella negative  - likely 2/2 aspiration PNA  - CT chest: multifocal consolidations most predominant in the left upper and right  lower lobes which may be due to infection. Additional possibility of a prior aspiration event should also be entertained given that there is extensive endobronchial secretions and impaction in the dependent posterior portion of the right lower lobe and dependent portions of the right upper lobe.  - s/p Zosyn (1/13-1/15), vancomycin (1/13-1/15; d/c'd as MRSA negative) and azithromycin (1/14-1/16; d/c'd as Legionella negative)  - c/w meropenem (1/15- )    #Emphysema  - seen on CT chest by MICU team, likely 2/2 hx of smoking  - c/w Symbicort BID     ====================CARDIOVASCULAR==================  #Multifactorial Shock  - vasoplegic shock i/s/o sepsis +/- mild cardiogenic component, currently on 3 pressors  - wean pressors as tolerated  - holding home BP and CHF meds as below  - f/u cortisol level to r/o adrenal insufficiency  - monitor I/Os, daily weights, SCr, and urine output    #Hx of CHF  - diastolic disfunction grade 1-2 on POCUS  - TTE (1/15): calcified aortic valve, grossly moderate segmental LV systolic dysfunction, inferior and inferolateral wall hypokinesis. RV possibly hypokinetic   -holding home Entresto and torsemide in setting of CHIARA  - holding home carvedilol in setting of sepsis and hypotension     #HLD (hyperlipidemia).   - c/w atorvastatin 20 mg- will give meds    #HTN (hypertension)  - BP low on admission and patient is currently in shock and on multiple pressors  - holding home carvedilol, Entresto, amlodipine in setting of sepsis and hypotension  - continue to monitor BP    ====================/RENAL========================  #CHIARA on CKD  - baseline SCr 2.1 in 12/2022 per Nephrology. UA without evidence of infection, currently stabilizing  - appreciate nephrology recommendations- due to pressor requirements and hypovolemia, will hold off on HD for now  - monitor I/Os, daily weights, SCr, and urine output    #Electrolytes  - Maintain K>4, Phos>3, Mag>2, iCal>1    ====================GI/NUTRITION=====================  #Diet:  - NPO w/ tube feeds  - bowel regimen: Senna, Miralax    #GI PPx:  - IV Protonix 40mg qd    ====================INFECTIOUS DISEASE================  Pt. currently afebrile and w/o leukocytosis   #COVID PNA  - acute hypoxic respiratory failure/sepsis 2/2 COVID PNA   - PCR positive 1/13, home test positive 1/11  - holding Remdesivir in setting of kidney failure  - c/w dexamethasone 6mg qd (1/13- )    #Multifocal PNA   - BCx (1/13): NGTD, MRSA negative, legionella negative  - likely 2/2 aspiration PNA  - s/p Zosyn (1/13-1/15), vancomycin (1/13-1/15; d/c'd as MRSA negative) and azithromycin (1/14-1/16; d/c'd as Legionella negative)  - c/w meropenem (1/15- )    ====================ENDOCRINE=======================  #DM2  - on Farxiga at home, unclear if for CHF vs DM  - A1c 6.3%  - while NPO, FSBG and moderate ISS q6h  - started NPH 5U q6h given persistent BG > 200s, likely 2/2 dexamethasone  - continue to monitor BGs    ====================HEMATOLOGIC/DVT PPx=============  #Thrombocytopenia  - plt 110 on admission, now downtrending, may be 2/2 HIT  - f/u JASPREET, PF4  - holding heparin for now    #DVT PPx  - holding for now given thrombocytopenia  - SCDs    ====================ETHICS===========================  Code Status: DNR

## 2023-01-16 NOTE — PROGRESS NOTE ADULT - SUBJECTIVE AND OBJECTIVE BOX
*******************************  Ger Toney MD (PGY-1)  Internal Medicine  Contact via [employer] TEAMS  [facility] Pager: [phone]  [facility] Pager: [phone]  *******************************    INTERVAL HPI/OVERNIGHT EVENTS:    SUBJECTIVE: Patient seen and examined at bedside.     MEDICATIONS  (STANDING):  atorvastatin 20 milliGRAM(s) Oral at bedtime  azithromycin  IVPB      azithromycin  IVPB 500 milliGRAM(s) IV Intermittent every 24 hours  budesonide  80 MICROgram(s)/formoterol 4.5 MICROgram(s) Inhaler 2 Puff(s) Inhalation two times a day  chlorhexidine 2% Cloths 1 Application(s) Topical daily  dexAMETHasone  Injectable 6 milliGRAM(s) IV Push daily  dextrose 5%. 1000 milliLiter(s) (100 mL/Hr) IV Continuous <Continuous>  dextrose 5%. 1000 milliLiter(s) (50 mL/Hr) IV Continuous <Continuous>  dextrose 50% Injectable 25 Gram(s) IV Push once  dextrose 50% Injectable 12.5 Gram(s) IV Push once  dextrose 50% Injectable 25 Gram(s) IV Push once  glucagon  Injectable 1 milliGRAM(s) IntraMuscular once  insulin lispro (ADMELOG) corrective regimen sliding scale   SubCutaneous every 6 hours  meropenem  IVPB      meropenem  IVPB 500 milliGRAM(s) IV Intermittent every 12 hours  norepinephrine Infusion 0.05 MICROgram(s)/kG/Min (3.56 mL/Hr) IV Continuous <Continuous>  pantoprazole  Injectable 40 milliGRAM(s) IV Push daily  phenylephrine    Infusion 0.1 MICROgram(s)/kG/Min (2.85 mL/Hr) IV Continuous <Continuous>  propofol Infusion 65.789 MICROgram(s)/kG/Min (30 mL/Hr) IV Continuous <Continuous>  sodium bicarbonate  Infusion 0.197 mEq/kG/Hr (100 mL/Hr) IV Continuous <Continuous>  vasopressin Infusion 0.04 Unit(s)/Min (6 mL/Hr) IV Continuous <Continuous>    MEDICATIONS  (PRN):  acetaminophen     Tablet .. 650 milliGRAM(s) Oral every 6 hours PRN Temp greater or equal to 38C (100.4F), Mild Pain (1 - 3), Moderate Pain (4 - 6)  dextrose Oral Gel 15 Gram(s) Oral once PRN Blood Glucose LESS THAN 70 milliGRAM(s)/deciliter      ALLERGIES:  Allergies    No Known Allergies    Intolerances        VITAL SIGNS:  ICU Vital Signs Last 24 Hrs  T(C): 35.9 (16 Jan 2023 04:00), Max: 36.7 (15 Anthony 2023 08:00)  T(F): 96.7 (16 Jan 2023 04:00), Max: 98.1 (15 Anthony 2023 08:00)  HR: 79 (16 Jan 2023 06:00) (77 - 102)  BP: 134/76 (15 Anthony 2023 17:00) (134/76 - 134/76)  BP(mean): 91 (15 Anthony 2023 17:00) (91 - 91)  ABP: 151/63 (16 Jan 2023 06:00) (52/35 - 163/69)  ABP(mean): 93 (16 Jan 2023 06:00) (41 - 102)  RR: 28 (16 Jan 2023 06:00) (25 - 30)  SpO2: 96% (16 Jan 2023 06:00) (60% - 100%)    O2 Parameters below as of 16 Jan 2023 06:00  Patient On (Oxygen Delivery Method): ventilator    O2 Concentration (%): 40      Mode: AC/ CMV (Assist Control/ Continuous Mandatory Ventilation), RR (machine): 28, TV (machine): 500, FiO2: 40, PEEP: 10, ITime: 0.82, MAP: 19, PIP: 33  Plateau pressure:   P/F ratio:     01-15 @ 07:01  -  01-16 @ 07:00  --------------------------------------------------------  IN: 6132.5 mL / OUT: 1100 mL / NET: 5032.5 mL      CAPILLARY BLOOD GLUCOSE      POCT Blood Glucose.: 307 mg/dL (16 Jan 2023 05:11)    ECG:    PHYSICAL EXAM:  GENERAL: NAD, lying in bed comfortably  HEAD:  Atraumatic, normocephalic  EYES: EOMI, PERRLA, conjunctiva and sclera clear  ENT: Moist mucous membranes  NECK: Supple, no JVD  HEART: S1, S2, regular rate and rhythm, no murmurs, rubs, or gallops  LUNGS: Unlabored respirations.  Clear to auscultation bilaterally, no crackles, wheezing, or rhonchi  ABDOMEN: Soft, nontender, nondistended, +BS  EXTREMITIES: 2+ peripheral pulses bilaterally. No clubbing, cyanosis, or edema  NERVOUS SYSTEM:  A&Ox3, no focal deficits   SKIN: No rashes or lesions  LINES:     LABS:                        9.8    9.55  )-----------( 22       ( 16 Jan 2023 03:24 )             30.4     01-16    134<L>  |  94<L>  |  56<H>  ----------------------------<  311<H>  3.5   |  20<L>  |  3.84<H>    Ca    6.6<L>      16 Jan 2023 03:24  Phos  6.6     01-16  Mg     2.00     01-16    TPro  5.3<L>  /  Alb  2.0<L>  /  TBili  2.1<H>  /  DBili  x   /  AST  462<H>  /  ALT  159<H>  /  AlkPhos  71  01-16    PT/INR - ( 15 Anthony 2023 20:13 )   PT: 13.4 sec;   INR: 1.15 ratio         PTT - ( 15 Anthony 2023 20:13 )  PTT:54.5 sec      RADIOLOGY & ADDITIONAL TESTS:     ====================ASSESSMENT======================    Plan:  ====================NEUROLOGY=======================    ====================RESPIRATORY======================    ====================CARDIOVASCULAR==================    ====================/RENAL========================    ====================GI/NUTRITION=====================    ====================SKIN=============================    ====================INFECTIOUS DISEASE================    ====================ENDOCRINE=======================    ====================HEMATOLOGIC/DVT PPx=============    ====================ETHICS===========================   *******************************  Ger Toney MD (PGY-1)  Internal Medicine  Contact via [employer] TEAMS  [facility] Pager: [phone]  [facility] Pager: [phone]  *******************************    INTERVAL HPI/OVERNIGHT EVENTS: pt. not deemed to be CRRT candidate by nephrology. lactate stable    SUBJECTIVE: Patient seen and examined at bedside.     MEDICATIONS  (STANDING):  atorvastatin 20 milliGRAM(s) Oral at bedtime  azithromycin  IVPB      azithromycin  IVPB 500 milliGRAM(s) IV Intermittent every 24 hours  budesonide  80 MICROgram(s)/formoterol 4.5 MICROgram(s) Inhaler 2 Puff(s) Inhalation two times a day  chlorhexidine 2% Cloths 1 Application(s) Topical daily  dexAMETHasone  Injectable 6 milliGRAM(s) IV Push daily  dextrose 5%. 1000 milliLiter(s) (100 mL/Hr) IV Continuous <Continuous>  dextrose 5%. 1000 milliLiter(s) (50 mL/Hr) IV Continuous <Continuous>  dextrose 50% Injectable 25 Gram(s) IV Push once  dextrose 50% Injectable 12.5 Gram(s) IV Push once  dextrose 50% Injectable 25 Gram(s) IV Push once  glucagon  Injectable 1 milliGRAM(s) IntraMuscular once  insulin lispro (ADMELOG) corrective regimen sliding scale   SubCutaneous every 6 hours  meropenem  IVPB      meropenem  IVPB 500 milliGRAM(s) IV Intermittent every 12 hours  norepinephrine Infusion 0.05 MICROgram(s)/kG/Min (3.56 mL/Hr) IV Continuous <Continuous>  pantoprazole  Injectable 40 milliGRAM(s) IV Push daily  phenylephrine    Infusion 0.1 MICROgram(s)/kG/Min (2.85 mL/Hr) IV Continuous <Continuous>  propofol Infusion 65.789 MICROgram(s)/kG/Min (30 mL/Hr) IV Continuous <Continuous>  sodium bicarbonate  Infusion 0.197 mEq/kG/Hr (100 mL/Hr) IV Continuous <Continuous>  vasopressin Infusion 0.04 Unit(s)/Min (6 mL/Hr) IV Continuous <Continuous>    MEDICATIONS  (PRN):  acetaminophen     Tablet .. 650 milliGRAM(s) Oral every 6 hours PRN Temp greater or equal to 38C (100.4F), Mild Pain (1 - 3), Moderate Pain (4 - 6)  dextrose Oral Gel 15 Gram(s) Oral once PRN Blood Glucose LESS THAN 70 milliGRAM(s)/deciliter    ALLERGIES:  Allergies  No Known Allergies    Intolerances    VITAL SIGNS:  ICU Vital Signs Last 24 Hrs  T(C): 35.9 (16 Jan 2023 04:00), Max: 36.7 (15 Anthony 2023 08:00)  T(F): 96.7 (16 Jan 2023 04:00), Max: 98.1 (15 Anthony 2023 08:00)  HR: 79 (16 Jan 2023 06:00) (77 - 102)  BP: 134/76 (15 Anthony 2023 17:00) (134/76 - 134/76)  BP(mean): 91 (15 Anthony 2023 17:00) (91 - 91)  ABP: 151/63 (16 Jan 2023 06:00) (52/35 - 163/69)  ABP(mean): 93 (16 Jan 2023 06:00) (41 - 102)  RR: 28 (16 Jan 2023 06:00) (25 - 30)  SpO2: 96% (16 Jan 2023 06:00) (60% - 100%)    O2 Parameters below as of 16 Jan 2023 06:00  Patient On (Oxygen Delivery Method): ventilator    O2 Concentration (%): 40    Mode: AC/ CMV (Assist Control/ Continuous Mandatory Ventilation), RR (machine): 28, TV (machine): 500, FiO2: 40, PEEP: 10, ITime: 0.82, MAP: 19, PIP: 33  Plateau pressure:   P/F ratio:     01-15 @ 07:01  -  01-16 @ 07:00  --------------------------------------------------------  IN: 6132.5 mL / OUT: 1100 mL / NET: 5032.5 mL    CAPILLARY BLOOD GLUCOSE    POCT Blood Glucose.: 307 mg/dL (16 Jan 2023 05:11)    ECG:    PHYSICAL EXAM:  GENERAL: intubated  HEENT: EOMI. PERRL  NECK: Supple, no JVD  HEART: S1, S2, regular rate and rhythm, no murmurs, rubs, or gallops  LUNGS: +b/l rales, rhonchi  ABDOMEN: Soft, nontender, nondistended, +BS  EXTREMITIES: 2+ peripheral pulses bilaterally. 1+ pitting edema b/l LE and UE  NERVOUS SYSTEM: intubated  SKIN: No rashes or lesions  LINES:     LABS:                        9.8    9.55  )-----------( 22       ( 16 Jan 2023 03:24 )             30.4     01-16    134<L>  |  94<L>  |  56<H>  ----------------------------<  311<H>  3.5   |  20<L>  |  3.84<H>    Ca    6.6<L>      16 Jan 2023 03:24  Phos  6.6     01-16  Mg     2.00     01-16    TPro  5.3<L>  /  Alb  2.0<L>  /  TBili  2.1<H>  /  DBili  x   /  AST  462<H>  /  ALT  159<H>  /  AlkPhos  71  01-16    PT/INR - ( 15 Anthony 2023 20:13 )   PT: 13.4 sec;   INR: 1.15 ratio    PTT - ( 15 Anthony 2023 20:13 )  PTT:54.5 sec    RADIOLOGY & ADDITIONAL TESTS: *******************************  Ger Toney MD (PGY-1)  Internal Medicine  Contact via [employer] TEAMS  [facility] Pager: [phone]  [facility] Pager: [phone]  *******************************    INTERVAL HPI/OVERNIGHT EVENTS: no acute events overnight    SUBJECTIVE: Patient seen and examined at bedside.     MEDICATIONS  (STANDING):  atorvastatin 20 milliGRAM(s) Oral at bedtime  budesonide  80 MICROgram(s)/formoterol 4.5 MICROgram(s) Inhaler 2 Puff(s) Inhalation two times a day  chlorhexidine 2% Cloths 1 Application(s) Topical daily  dexAMETHasone  Injectable 6 milliGRAM(s) IV Push daily  dextrose 5%. 1000 milliLiter(s) (100 mL/Hr) IV Continuous <Continuous>  dextrose 5%. 1000 milliLiter(s) (50 mL/Hr) IV Continuous <Continuous>  dextrose 50% Injectable 25 Gram(s) IV Push once  dextrose 50% Injectable 12.5 Gram(s) IV Push once  dextrose 50% Injectable 25 Gram(s) IV Push once  glucagon  Injectable 1 milliGRAM(s) IntraMuscular once  insulin lispro (ADMELOG) corrective regimen sliding scale   SubCutaneous every 6 hours  insulin NPH human recombinant 5 Unit(s) SubCutaneous every 6 hours  meropenem  IVPB      meropenem  IVPB 500 milliGRAM(s) IV Intermittent every 12 hours  norepinephrine Infusion 0.05 MICROgram(s)/kG/Min (3.56 mL/Hr) IV Continuous <Continuous>  pantoprazole  Injectable 40 milliGRAM(s) IV Push daily  phenylephrine    Infusion 0.1 MICROgram(s)/kG/Min (2.85 mL/Hr) IV Continuous <Continuous>  polyethylene glycol 3350 17 Gram(s) Oral two times a day  propofol Infusion 65.789 MICROgram(s)/kG/Min (30 mL/Hr) IV Continuous <Continuous>  senna 2 Tablet(s) Oral at bedtime  vasopressin Infusion 0.04 Unit(s)/Min (6 mL/Hr) IV Continuous <Continuous>    MEDICATIONS  (PRN):  acetaminophen     Tablet .. 650 milliGRAM(s) Oral every 6 hours PRN Temp greater or equal to 38C (100.4F), Mild Pain (1 - 3), Moderate Pain (4 - 6)  dextrose Oral Gel 15 Gram(s) Oral once PRN Blood Glucose LESS THAN 70 milliGRAM(s)/deciliter    ALLERGIES:  Allergies  No Known Allergies    Intolerances    VITAL SIGNS:  ICU Vital Signs Last 24 Hrs  T(C): 35.9 (16 Jan 2023 04:00), Max: 36.7 (15 Anthony 2023 08:00)  T(F): 96.7 (16 Jan 2023 04:00), Max: 98.1 (15 Anthony 2023 08:00)  HR: 79 (16 Jan 2023 06:00) (77 - 102)  BP: 134/76 (15 Anthony 2023 17:00) (134/76 - 134/76)  BP(mean): 91 (15 Anthony 2023 17:00) (91 - 91)  ABP: 151/63 (16 Jan 2023 06:00) (52/35 - 163/69)  ABP(mean): 93 (16 Jan 2023 06:00) (41 - 102)  RR: 28 (16 Jan 2023 06:00) (25 - 30)  SpO2: 96% (16 Jan 2023 06:00) (60% - 100%)    O2 Parameters below as of 16 Jan 2023 06:00  Patient On (Oxygen Delivery Method): ventilator    O2 Concentration (%): 40    Mode: AC/ CMV (Assist Control/ Continuous Mandatory Ventilation), RR (machine): 28, TV (machine): 500, FiO2: 40, PEEP: 10, ITime: 0.82, MAP: 19, PIP: 33  Plateau pressure:   P/F ratio:     01-15 @ 07:01  -  01-16 @ 07:00  --------------------------------------------------------  IN: 6132.5 mL / OUT: 1100 mL / NET: 5032.5 mL    CAPILLARY BLOOD GLUCOSE    POCT Blood Glucose.: 307 mg/dL (16 Jan 2023 05:11)    ECG:    PHYSICAL EXAM:  GENERAL: intubated  HEENT: EOMI. PERRL  NECK: Supple, no JVD  HEART: S1, S2, regular rate and rhythm, no murmurs, rubs, or gallops  LUNGS: +b/l rales, rhonchi  ABDOMEN: Soft, nontender, nondistended, +BS  EXTREMITIES: 2+ peripheral pulses bilaterally. 1+ pitting edema b/l LE and UE  NERVOUS SYSTEM: intubated  SKIN: No rashes or lesions  LINES:     LABS:                        9.8    9.55  )-----------( 22       ( 16 Jan 2023 03:24 )             30.4     01-16    134<L>  |  94<L>  |  56<H>  ----------------------------<  311<H>  3.5   |  20<L>  |  3.84<H>    Ca    6.6<L>      16 Jan 2023 03:24  Phos  6.6     01-16  Mg     2.00     01-16    TPro  5.3<L>  /  Alb  2.0<L>  /  TBili  2.1<H>  /  DBili  x   /  AST  462<H>  /  ALT  159<H>  /  AlkPhos  71  01-16    PT/INR - ( 15 Anthony 2023 20:13 )   PT: 13.4 sec;   INR: 1.15 ratio    PTT - ( 15 Anthony 2023 20:13 )  PTT:54.5 sec    RADIOLOGY & ADDITIONAL TESTS:

## 2023-01-16 NOTE — PROGRESS NOTE ADULT - SUBJECTIVE AND OBJECTIVE BOX
[facility] [place] NEPHROLOGY PRACT[facility]   MD CATRINA ZUÑIGA MD KRISTINE SOLTANPOUR, DO ANGELA WONG, PA    TEL:  OFFICE: [phone]  From 5pm-7am Answering Service [phone]    -- RENAL FOLLOW UP NOTE ---Date of Service 01-16-23 @ 12:13    Patient is a 82y old  Male who presents with a chief complaint of COVID (16 Jan 2023 07:09)      Patient seen and examined at bedside.     VITALS:  T(F): 96.7 (01-16-23 @ 04:00), Max: 97.5 (01-16-23 @ 00:00)  HR: 80 (01-16-23 @ 11:00)  BP: 158/87 (01-16-23 @ 08:00)  RR: 19 (01-16-23 @ 11:00)  SpO2: 98% (01-16-23 @ 10:27)  Wt(kg): --    01-15 @ 07:01 - 01-16 @ 07:00  --------------------------------------------------------  IN: 6132.5 mL / OUT: 1100 mL / NET: 5032.5 mL    01-16 @ 07:01  -  01-16 @ 12:13  --------------------------------------------------------  IN: 615.2 mL / OUT: 360 mL / NET: 255.2 mL          PHYSICAL EXAM:  General: intubated  Neck: No JVD  Respiratory: + rhonchi  Cardiovascular: S1, S2, RRR  Gastrointestinal: BS+, soft, NT/ND  gu: kaufman, non oliguric   Extremities: No peripheral edema    Hospital Medications:   MEDICATIONS  (STANDING):  atorvastatin 20 milliGRAM(s) Oral at bedtime  azithromycin  IVPB      azithromycin  IVPB 500 milliGRAM(s) IV Intermittent every 24 hours  budesonide  80 MICROgram(s)/formoterol 4.5 MICROgram(s) Inhaler 2 Puff(s) Inhalation two times a day  chlorhexidine 2% Cloths 1 Application(s) Topical daily  dexAMETHasone  Injectable 6 milliGRAM(s) IV Push daily  dextrose 5%. 1000 milliLiter(s) (100 mL/Hr) IV Continuous <Continuous>  dextrose 5%. 1000 milliLiter(s) (50 mL/Hr) IV Continuous <Continuous>  dextrose 50% Injectable 25 Gram(s) IV Push once  dextrose 50% Injectable 12.5 Gram(s) IV Push once  dextrose 50% Injectable 25 Gram(s) IV Push once  glucagon  Injectable 1 milliGRAM(s) IntraMuscular once  insulin lispro (ADMELOG) corrective regimen sliding scale   SubCutaneous every 6 hours  meropenem  IVPB      meropenem  IVPB 500 milliGRAM(s) IV Intermittent every 12 hours  norepinephrine Infusion 0.05 MICROgram(s)/kG/Min (3.56 mL/Hr) IV Continuous <Continuous>  pantoprazole  Injectable 40 milliGRAM(s) IV Push daily  phenylephrine    Infusion 0.1 MICROgram(s)/kG/Min (2.85 mL/Hr) IV Continuous <Continuous>  propofol Infusion 65.789 MICROgram(s)/kG/Min (30 mL/Hr) IV Continuous <Continuous>  vasopressin Infusion 0.04 Unit(s)/Min (6 mL/Hr) IV Continuous <Continuous>      LABS:  01-16    134<L>  |  94<L>  |  56<H>  ----------------------------<  311<H>  3.5   |  20<L>  |  3.84<H>    Ca    6.6<L>      16 Jan 2023 03:24  Phos  6.6     01-16  Mg     2.00     01-16    TPro  5.3<L>  /  Alb  2.0<L>  /  TBili  2.1<H>  /  DBili      /  AST  462<H>  /  ALT  159<H>  /  AlkPhos  71  01-16    Creatinine Trend: 3.84 <--, 4.23 <--, 4.34 <--, 4.59 <--, 4.33 <--, 4.34 <--, 3.87 <--, 2.86 <--    Albumin, Serum: 2.0 g/dL (01-16 @ 03:24)  Phosphorus Level, Serum: 6.6 mg/dL (01-16 @ 03:24)  Albumin, Serum: 2.2 g/dL (01-15 @ 20:13)  Phosphorus Level, Serum: 7.4 mg/dL (01-15 @ 20:13)                              9.8    9.55  )-----------( 22 ( 16 Jan 2023 03:24 )             30.4     Urine Studies:  Urinalysis - [01-13-23 @ 05:34]      Color Yellow / Appearance Slightly Turbid / SG 1.015 / pH 5.5      Gluc Negative / Ketone Negative  / Bili Negative / Urobili <2 mg/dL       Blood Negative / Protein 30 mg/dL / Leuk Est Negative / Nitrite Negative      RBC 2 / WBC 1 / Hyaline 6 / Gran  / Sq Epi  / Non Sq Epi 2 / Bacteria Negative    Urine Creatinine 189      [01-13-23 @ 18:41]  Urine Protein 30      [01-13-23 @ 18:41]  Urine Sodium 31      [01-13-23 @ 18:41]  Urine Urea Nitrogen 527.0      [01-13-23 @ 18:41]    Ferritin 612      [01-14-23 @ 04:45]  HbA1c 7.0      [05-26-18 @ 16:34]  TSH 0.59      [01-14-23 @ 14:00]  Lipid: chol 60, , HDL 36, LDL --      [01-14-23 @ 04:45]        RADIOLOGY & ADDITIONAL STUDIES:

## 2023-01-17 NOTE — PROGRESS NOTE ADULT - SUBJECTIVE AND OBJECTIVE BOX
[facility] [place] [facility]ROLOGY PRACT[facility]   MD CATRINA ZUÑIGA MD KRISTINE SOLTANPOUR, DO ANGELA WONG, PA    TEL:  OFFICE: [phone]  From 5pm-7am Answering Service [phone]    -- RENAL FOLLOW UP NOTE ---Date of Service 01-17-23 @ 13:21    Patient is a 82y old  Male who presents with a chief complaint of COVID (17 Jan 2023 07:49)      Patient seen and examined at bedside.     VITALS:  T(F): 100.4 (01-17-23 @ 12:00), Max: 100.9 (01-17-23 @ 11:00)  HR: 93 (01-17-23 @ 13:00)  BP: 128/84 (01-17-23 @ 12:00)  RR: 26 (01-17-23 @ 13:00)  SpO2: 92% (01-17-23 @ 13:00)  Wt(kg): --    01-16 @ 07:01  -  01-17 @ 07:00  --------------------------------------------------------  IN: 3769.9 mL / OUT: 2505 mL / NET: 1264.9 mL    01-17 @ 07:01  -  01-17 @ 13:21  --------------------------------------------------------  IN: 960.8 mL / OUT: 400 mL / NET: 560.8 mL          PHYSICAL EXAM:  General: intubated  Neck: No JVD  Respiratory: CTAB, no wheezes, rales or rhonchi  Cardiovascular: S1, S2, RRR  Gastrointestinal: BS+, soft, NT/ND   + kaufman non oliguric   Extremities: No peripheral edema    Hospital Medications:   MEDICATIONS  (STANDING):  atorvastatin 20 milliGRAM(s) Oral at bedtime  budesonide  80 MICROgram(s)/formoterol 4.5 MICROgram(s) Inhaler 2 Puff(s) Inhalation two times a day  chlorhexidine 2% Cloths 1 Application(s) Topical daily  dextrose 5%. 1000 milliLiter(s) (100 mL/Hr) IV Continuous <Continuous>  dextrose 5%. 1000 milliLiter(s) (50 mL/Hr) IV Continuous <Continuous>  dextrose 50% Injectable 25 Gram(s) IV Push once  dextrose 50% Injectable 12.5 Gram(s) IV Push once  dextrose 50% Injectable 25 Gram(s) IV Push once  glucagon  Injectable 1 milliGRAM(s) IntraMuscular once  insulin lispro (ADMELOG) corrective regimen sliding scale   SubCutaneous every 6 hours  insulin NPH human recombinant 5 Unit(s) SubCutaneous every 6 hours  meropenem  IVPB 500 milliGRAM(s) IV Intermittent every 12 hours  meropenem  IVPB      norepinephrine Infusion 0.05 MICROgram(s)/kG/Min (3.56 mL/Hr) IV Continuous <Continuous>  phenylephrine    Infusion 0.1 MICROgram(s)/kG/Min (2.85 mL/Hr) IV Continuous <Continuous>  polyethylene glycol 3350 17 Gram(s) Oral two times a day  propofol Infusion 65.789 MICROgram(s)/kG/Min (30 mL/Hr) IV Continuous <Continuous>  senna 2 Tablet(s) Oral at bedtime  vasopressin Infusion 0.04 Unit(s)/Min (6 mL/Hr) IV Continuous <Continuous>      LABS:  01-17    137  |  97<L>  |  59<H>  ----------------------------<  226<H>  3.3<L>   |  21<L>  |  3.38<H>    Ca    6.6<L>      17 Jan 2023 03:41  Phos  6.6     01-16  Mg     2.00     01-17    TPro  5.6<L>  /  Alb  2.1<L>  /  TBili  1.8<H>  /  DBili      /  AST  288<H>  /  ALT  139<H>  /  AlkPhos  90  01-17    Creatinine Trend: 3.38 <--, 3.44 <--, 3.84 <--, 4.23 <--, 4.34 <--, 4.59 <--, 4.33 <--, 4.34 <--, 3.87 <--, 2.86 <--    Albumin, Serum: 2.1 g/dL (01-17 @ 03:41)  Albumin, Serum: 2.3 g/dL (01-16 @ 16:50)                              9.5    12.26 )-----------( 16 ( 17 Jan 2023 03:41 )             29.6     Urine Studies:  Urinalysis - [01-13-23 @ 05:34]      Color Yellow / Appearance Slightly Turbid / SG 1.015 / pH 5.5      Gluc Negative / Ketone Negative  / Bili Negative / Urobili <2 mg/dL       Blood Negative / Protein 30 mg/dL / Leuk Est Negative / Nitrite Negative      RBC 2 / WBC 1 / Hyaline 6 / Gran  / Sq Epi  / Non Sq Epi 2 / Bacteria Negative    Urine Creatinine 189      [01-13-23 @ 18:41]  Urine Protein 30      [01-13-23 @ 18:41]  Urine Sodium 31      [01-13-23 @ 18:41]  Urine Urea Nitrogen 527.0      [01-13-23 @ 18:41]    Ferritin 612      [01-14-23 @ 04:45]  HbA1c 7.0      [05-26-18 @ 16:34]  TSH 0.59      [01-14-23 @ 14:00]  Lipid: chol 60, , HDL 36, LDL --      [01-14-23 @ 04:45]        RADIOLOGY & ADDITIONAL STUDIES:

## 2023-01-17 NOTE — PROGRESS NOTE ADULT - ASSESSMENT
82M DM, CAD, CHF, Dementia, CKD  bibems accompanied by daughter after a fall this evening. Nephrology consulted for acute on ckd    CHIARA on CKD stage 4  baseline ~ 2.1 12/2022  CHIARA possible ATN vs prerenal  renal function improving  diuretic ACE on hold  Feurea <35% suggestive of prerenal  abd us without hydro  monitor bmp and urine output  avoid nephrotoxic agents  As per discussion regarding HD with family and ICU providers.  Patient is on multiple vasopressure-d/w wife and daughter and they don't want him to go through dialysis    proteinuria  mild  urine p/c ratio 0.2  not significant     acidosis  lactic acidosis   improving  monitor    covid/pna  care per team

## 2023-01-17 NOTE — PROGRESS NOTE ADULT - SUBJECTIVE AND OBJECTIVE BOX
*******************************  Ger Toney MD (PGY-1)  Internal Medicine  Contact via [employer] TEAMS  [facility] Pager: [phone]  [facility] Pager: [phone]  *******************************    INTERVAL HPI/OVERNIGHT EVENTS: Given IV Bumex 4mg yday for additional diuresis. No acute events overnight.    SUBJECTIVE: Patient seen and examined at bedside.     MEDICATIONS  (STANDING):  atorvastatin 20 milliGRAM(s) Oral at bedtime  budesonide  80 MICROgram(s)/formoterol 4.5 MICROgram(s) Inhaler 2 Puff(s) Inhalation two times a day  chlorhexidine 2% Cloths 1 Application(s) Topical daily  dexAMETHasone  Injectable 6 milliGRAM(s) IV Push daily  dextrose 5%. 1000 milliLiter(s) (100 mL/Hr) IV Continuous <Continuous>  dextrose 5%. 1000 milliLiter(s) (50 mL/Hr) IV Continuous <Continuous>  dextrose 50% Injectable 25 Gram(s) IV Push once  dextrose 50% Injectable 12.5 Gram(s) IV Push once  dextrose 50% Injectable 25 Gram(s) IV Push once  glucagon  Injectable 1 milliGRAM(s) IntraMuscular once  insulin lispro (ADMELOG) corrective regimen sliding scale   SubCutaneous every 6 hours  insulin NPH human recombinant 5 Unit(s) SubCutaneous every 6 hours  meropenem  IVPB      meropenem  IVPB 500 milliGRAM(s) IV Intermittent every 12 hours  norepinephrine Infusion 0.05 MICROgram(s)/kG/Min (3.56 mL/Hr) IV Continuous <Continuous>  pantoprazole  Injectable 40 milliGRAM(s) IV Push daily  phenylephrine    Infusion 0.1 MICROgram(s)/kG/Min (2.85 mL/Hr) IV Continuous <Continuous>  polyethylene glycol 3350 17 Gram(s) Oral two times a day  potassium chloride  10 mEq/100 mL IVPB 10 milliEquivalent(s) IV Intermittent every 1 hour  propofol Infusion 65.789 MICROgram(s)/kG/Min (30 mL/Hr) IV Continuous <Continuous>  senna 2 Tablet(s) Oral at bedtime  vasopressin Infusion 0.04 Unit(s)/Min (6 mL/Hr) IV Continuous <Continuous>    MEDICATIONS  (PRN):  acetaminophen     Tablet .. 650 milliGRAM(s) Oral every 6 hours PRN Temp greater or equal to 38C (100.4F), Mild Pain (1 - 3), Moderate Pain (4 - 6)  dextrose Oral Gel 15 Gram(s) Oral once PRN Blood Glucose LESS THAN 70 milliGRAM(s)/deciliter    ALLERGIES:  Allergies    No Known Allergies    Intolerances    VITAL SIGNS:  ICU Vital Signs Last 24 Hrs  T(C): 37.6 (17 Jan 2023 04:00), Max: 37.6 (16 Jan 2023 20:00)  T(F): 99.6 (17 Jan 2023 04:00), Max: 99.6 (16 Jan 2023 20:00)  HR: 99 (17 Jan 2023 07:00) (75 - 99)  BP: 141/79 (17 Jan 2023 04:00) (113/67 - 158/87)  BP(mean): 95 (17 Jan 2023 04:00) (67 - 105)  ABP: 123/63 (17 Jan 2023 07:00) (106/57 - 149/63)  ABP(mean): 85 (17 Jan 2023 07:00) (74 - 95)  RR: 26 (17 Jan 2023 07:00) (17 - 28)  SpO2: 96% (17 Jan 2023 06:15) (96% - 98%)    O2 Parameters below as of 17 Jan 2023 07:00  Patient On (Oxygen Delivery Method): ventilator    O2 Concentration (%): 40    Mode: AC/ CMV (Assist Control/ Continuous Mandatory Ventilation), RR (machine): 26, TV (machine): 500, FiO2: 40, PEEP: 10, ITime: 0.67, MAP: 18, PIP: 36  Plateau pressure:   P/F ratio:     01-16 @ 07:01  -  01-17 @ 07:00  --------------------------------------------------------  IN: 3769.9 mL / OUT: 2405 mL / NET: 1364.9 mL    CAPILLARY BLOOD GLUCOSE    POCT Blood Glucose.: 145 mg/dL (17 Jan 2023 06:34)    ECG:    PHYSICAL EXAM:  GENERAL: intubated  HEENT: EOMI. PERRL  NECK: Supple, no JVD  HEART: S1, S2, regular rate and rhythm, no murmurs, rubs, or gallops  LUNGS: +b/l rales, rhonchi  ABDOMEN: Soft, nontender, nondistended, +BS  EXTREMITIES: 2+ peripheral pulses bilaterally. 1+ pitting edema b/l LE and UE  NERVOUS SYSTEM: intubated  SKIN: No rashes or lesions    LABS:                        9.5    12.26 )-----------( 16       ( 17 Jan 2023 03:41 )             29.6     01-17    137  |  97<L>  |  59<H>  ----------------------------<  226<H>  3.3<L>   |  21<L>  |  3.38<H>    Ca    6.6<L>      17 Jan 2023 03:41  Phos  6.6     01-16  Mg     2.00     01-17    TPro  5.6<L>  /  Alb  2.1<L>  /  TBili  1.8<H>  /  DBili  x   /  AST  288<H>  /  ALT  139<H>  /  AlkPhos  90  01-17    PT/INR - ( 15 Anthony 2023 20:13 )   PT: 13.4 sec;   INR: 1.15 ratio    PTT - ( 17 Jan 2023 03:41 )  PTT:38.6 sec    RADIOLOGY & ADDITIONAL TESTS:  *******************************  Ger Toney MD (PGY-1)  Internal Medicine  Contact via [employer] TEAMS  [facility] Pager: [phone]  [facility] Pager: [phone]  *******************************    INTERVAL HPI/OVERNIGHT EVENTS: Given IV Bumex 4mg yday for additional diuresis. No acute events overnight.    SUBJECTIVE: Patient seen and examined at bedside.     MEDICATIONS  (STANDING):  atorvastatin 20 milliGRAM(s) Oral at bedtime  budesonide  80 MICROgram(s)/formoterol 4.5 MICROgram(s) Inhaler 2 Puff(s) Inhalation two times a day  chlorhexidine 2% Cloths 1 Application(s) Topical daily  dextrose 5%. 1000 milliLiter(s) (100 mL/Hr) IV Continuous <Continuous>  dextrose 5%. 1000 milliLiter(s) (50 mL/Hr) IV Continuous <Continuous>  dextrose 50% Injectable 25 Gram(s) IV Push once  dextrose 50% Injectable 12.5 Gram(s) IV Push once  dextrose 50% Injectable 25 Gram(s) IV Push once  glucagon  Injectable 1 milliGRAM(s) IntraMuscular once  insulin lispro (ADMELOG) corrective regimen sliding scale   SubCutaneous every 6 hours  insulin NPH human recombinant 5 Unit(s) SubCutaneous every 6 hours  meropenem  IVPB 500 milliGRAM(s) IV Intermittent every 12 hours  meropenem  IVPB      norepinephrine Infusion 0.05 MICROgram(s)/kG/Min (3.56 mL/Hr) IV Continuous <Continuous>  phenylephrine    Infusion 0.1 MICROgram(s)/kG/Min (2.85 mL/Hr) IV Continuous <Continuous>  polyethylene glycol 3350 17 Gram(s) Oral two times a day  propofol Infusion 65.789 MICROgram(s)/kG/Min (30 mL/Hr) IV Continuous <Continuous>  senna 2 Tablet(s) Oral at bedtime  vasopressin Infusion 0.04 Unit(s)/Min (6 mL/Hr) IV Continuous <Continuous>    MEDICATIONS  (PRN):  acetaminophen     Tablet .. 650 milliGRAM(s) Oral every 6 hours PRN Temp greater or equal to 38C (100.4F), Mild Pain (1 - 3), Moderate Pain (4 - 6)  dextrose Oral Gel 15 Gram(s) Oral once PRN Blood Glucose LESS THAN 70 milliGRAM(s)/deciliter    ALLERGIES:  Allergies    No Known Allergies    Intolerances    VITAL SIGNS:  ICU Vital Signs Last 24 Hrs  T(C): 37.6 (17 Jan 2023 04:00), Max: 37.6 (16 Jan 2023 20:00)  T(F): 99.6 (17 Jan 2023 04:00), Max: 99.6 (16 Jan 2023 20:00)  HR: 99 (17 Jan 2023 07:00) (75 - 99)  BP: 141/79 (17 Jan 2023 04:00) (113/67 - 158/87)  BP(mean): 95 (17 Jan 2023 04:00) (67 - 105)  ABP: 123/63 (17 Jan 2023 07:00) (106/57 - 149/63)  ABP(mean): 85 (17 Jan 2023 07:00) (74 - 95)  RR: 26 (17 Jan 2023 07:00) (17 - 28)  SpO2: 96% (17 Jan 2023 06:15) (96% - 98%)    O2 Parameters below as of 17 Jan 2023 07:00  Patient On (Oxygen Delivery Method): ventilator    O2 Concentration (%): 40    Mode: AC/ CMV (Assist Control/ Continuous Mandatory Ventilation), RR (machine): 26, TV (machine): 500, FiO2: 40, PEEP: 10, ITime: 0.67, MAP: 18, PIP: 36  Plateau pressure:   P/F ratio:     01-16 @ 07:01  -  01-17 @ 07:00  --------------------------------------------------------  IN: 3769.9 mL / OUT: 2405 mL / NET: 1364.9 mL    CAPILLARY BLOOD GLUCOSE    POCT Blood Glucose.: 145 mg/dL (17 Jan 2023 06:34)    ECG:    PHYSICAL EXAM:  GENERAL: intubated  HEENT: EOMI. PERRL  NECK: Supple, no JVD  HEART: S1, S2, regular rate and rhythm, no murmurs, rubs, or gallops  LUNGS: +b/l rales, rhonchi  ABDOMEN: Soft, nontender, nondistended, +BS  EXTREMITIES: 2+ peripheral pulses bilaterally. 1+ pitting edema b/l LE and UE  NERVOUS SYSTEM: intubated  SKIN: No rashes or lesions    LABS:                        9.5    12.26 )-----------( 16       ( 17 Jan 2023 03:41 )             29.6     01-17    137  |  97<L>  |  59<H>  ----------------------------<  226<H>  3.3<L>   |  21<L>  |  3.38<H>    Ca    6.6<L>      17 Jan 2023 03:41  Phos  6.6     01-16  Mg     2.00     01-17    TPro  5.6<L>  /  Alb  2.1<L>  /  TBili  1.8<H>  /  DBili  x   /  AST  288<H>  /  ALT  139<H>  /  AlkPhos  90  01-17    PT/INR - ( 15 Anthony 2023 20:13 )   PT: 13.4 sec;   INR: 1.15 ratio    PTT - ( 17 Jan 2023 03:41 )  PTT:38.6 sec    RADIOLOGY & ADDITIONAL TESTS:

## 2023-01-18 NOTE — CHART NOTE - NSCHARTNOTEFT_GEN_A_CORE
: Zain TOVAR    INDICATION: Resp failure    PROCEDURE:  [X] LIMITED ECHO  [X] LIMITED CHEST  [ ] LIMITED RETROPERITONEAL  [ ] LIMITED ABDOMINAL  [ ] LIMITED DVT  [ ] NEEDLE GUIDANCE VASCULAR  [ ] NEEDLE GUIDANCE THORACENTESIS  [ ] NEEDLE GUIDANCE PARACENTESIS  [ ] NEEDLE GUIDANCE PERICARDIOCENTESIS  [ ] OTHER    FINDINGS:     Lung: RLL and LLL translobar PNA Otherwise A line pattern with occasional B lines    Cardiac: Normal GDE    INTERPRETATION:    C/W bilobar PNA No cardiac limitation    I was present during the key portions of the procedure and immediately available during the entire procedure.  Zain TOVAR  Attending

## 2023-01-18 NOTE — PROGRESS NOTE ADULT - ASSESSMENT
82M DM, CAD, CHF, Dementia, CKD  bibems accompanied by daughter after a fall this evening. Nephrology consulted for acute on ckd    CHIARA on CKD stage 4  baseline ~ 2.1 12/2022  CHIARA possible ATN vs prerenal  renal function worsen again  diuretic ACE on hold  Feurea <35% suggestive of prerenal  s/p bumex 1/16  non oliguric  seems euvolemic  consider gentle hydration with NS @ 75cc/hr x1L  monitor  abd us without hydro  monitor bmp and urine output  avoid nephrotoxic agents  As per discussion regarding HD with family and ICU providers.  Patient is on multiple vasopressure-d/w wife and daughter and they don't want him to go through dialysis    proteinuria  mild  urine p/c ratio 0.2  not significant     acidosis  lactic acidosis   improving  monitor    hypokalemia  supplement per team  monitor    covid/pna  care per team

## 2023-01-18 NOTE — PROGRESS NOTE ADULT - SUBJECTIVE AND OBJECTIVE BOX
*******************************  Ger Toney MD (PGY-1)  Internal Medicine  Contact via [employer] TEAMS  [facility] Pager: [phone]  [facility] Pager: [phone]  *******************************    INTERVAL HPI/OVERNIGHT EVENTS: No acute events overnight.    SUBJECTIVE: Patient seen and examined at bedside.     MEDICATIONS  (STANDING):  atorvastatin 20 milliGRAM(s) Oral at bedtime  budesonide  80 MICROgram(s)/formoterol 4.5 MICROgram(s) Inhaler 2 Puff(s) Inhalation two times a day  chlorhexidine 2% Cloths 1 Application(s) Topical daily  dextrose 5%. 1000 milliLiter(s) (100 mL/Hr) IV Continuous <Continuous>  dextrose 5%. 1000 milliLiter(s) (50 mL/Hr) IV Continuous <Continuous>  dextrose 50% Injectable 25 Gram(s) IV Push once  dextrose 50% Injectable 12.5 Gram(s) IV Push once  dextrose 50% Injectable 25 Gram(s) IV Push once  glucagon  Injectable 1 milliGRAM(s) IntraMuscular once  insulin lispro (ADMELOG) corrective regimen sliding scale   SubCutaneous every 6 hours  insulin NPH human recombinant 5 Unit(s) SubCutaneous every 6 hours  meropenem  IVPB      meropenem  IVPB 500 milliGRAM(s) IV Intermittent every 12 hours  norepinephrine Infusion 0.05 MICROgram(s)/kG/Min (3.56 mL/Hr) IV Continuous <Continuous>  phenylephrine    Infusion 0.1 MICROgram(s)/kG/Min (2.85 mL/Hr) IV Continuous <Continuous>  polyethylene glycol 3350 17 Gram(s) Oral two times a day  propofol Infusion 65.789 MICROgram(s)/kG/Min (30 mL/Hr) IV Continuous <Continuous>  senna 2 Tablet(s) Oral at bedtime  vasopressin Infusion 0.04 Unit(s)/Min (6 mL/Hr) IV Continuous <Continuous>    MEDICATIONS  (PRN):  acetaminophen     Tablet .. 650 milliGRAM(s) Oral every 6 hours PRN Temp greater or equal to 38C (100.4F), Mild Pain (1 - 3), Moderate Pain (4 - 6)  dextrose Oral Gel 15 Gram(s) Oral once PRN Blood Glucose LESS THAN 70 milliGRAM(s)/deciliter      ALLERGIES:  Allergies    No Known Allergies    Intolerances        VITAL SIGNS:  ICU Vital Signs Last 24 Hrs  T(C): 37.6 (18 Jan 2023 04:00), Max: 38.3 (17 Jan 2023 11:00)  T(F): 99.6 (18 Jan 2023 04:00), Max: 100.9 (17 Jan 2023 11:00)  HR: 82 (18 Jan 2023 07:40) (82 - 98)  BP: 141/65 (18 Jan 2023 04:00) (128/84 - 149/72)  BP(mean): 87 (18 Jan 2023 04:00) (87 - 100)  ABP: 123/54 (18 Jan 2023 07:40) (113/53 - 144/62)  ABP(mean): 80 (18 Jan 2023 07:40) (73 - 92)  RR: 26 (18 Jan 2023 07:40) (21 - 26)  SpO2: 94% (18 Jan 2023 07:40) (89% - 100%)    O2 Parameters below as of 18 Jan 2023 07:30      O2 Concentration (%): 50      Mode: AC/ CMV (Assist Control/ Continuous Mandatory Ventilation), RR (machine): 26, TV (machine): 500, FiO2: 50, PEEP: 10, MAP: 18, PIP: 35  Plateau pressure:   P/F ratio:     01-17 @ 07:01  -  01-18 @ 07:00  --------------------------------------------------------  IN: 3332.8 mL / OUT: 2300 mL / NET: 1032.8 mL    01-18 @ 07:01  -  01-18 @ 07:41  --------------------------------------------------------  IN: 35 mL / OUT: 0 mL / NET: 35 mL      CAPILLARY BLOOD GLUCOSE      POCT Blood Glucose.: 271 mg/dL (18 Jan 2023 04:41)    ECG:    PHYSICAL EXAM:  GENERAL: NAD, lying in bed comfortably  HEAD:  Atraumatic, normocephalic  EYES: EOMI, PERRLA, conjunctiva and sclera clear  ENT: Moist mucous membranes  NECK: Supple, no JVD  HEART: S1, S2, regular rate and rhythm, no murmurs, rubs, or gallops  LUNGS: Unlabored respirations.  Clear to auscultation bilaterally, no crackles, wheezing, or rhonchi  ABDOMEN: Soft, nontender, nondistended, +BS  EXTREMITIES: 2+ peripheral pulses bilaterally. No clubbing, cyanosis, or edema  NERVOUS SYSTEM:  A&Ox3, no focal deficits   SKIN: No rashes or lesions  LINES:     LABS:                        8.4    15.49 )-----------( 26       ( 18 Jan 2023 03:15 )             26.2     01-18    136  |  96<L>  |  76<H>  ----------------------------<  265<H>  3.1<L>   |  23  |  3.68<H>    Ca    7.1<L>      18 Jan 2023 03:15  Phos  4.9     01-18  Mg     2.20     01-18    TPro  5.7<L>  /  Alb  1.9<L>  /  TBili  1.6<H>  /  DBili  x   /  AST  259<H>  /  ALT  119<H>  /  AlkPhos  108  01-18    PT/INR - ( 18 Jan 2023 03:15 )   PT: 11.0 sec;   INR: 0.95 ratio         PTT - ( 18 Jan 2023 03:15 )  PTT:36.6 sec      RADIOLOGY & ADDITIONAL TESTS:  *******************************  Ger Toney MD (PGY-1)  Internal Medicine  Contact via [employer] TEAMS  [facility] Pager: [phone]  [facility] Pager: [phone]  *******************************    INTERVAL HPI/OVERNIGHT EVENTS: Plt 26 after transfusion    SUBJECTIVE: Patient seen and examined at bedside.     MEDICATIONS  (STANDING):  atorvastatin 20 milliGRAM(s) Oral at bedtime  budesonide  80 MICROgram(s)/formoterol 4.5 MICROgram(s) Inhaler 2 Puff(s) Inhalation two times a day  chlorhexidine 2% Cloths 1 Application(s) Topical daily  dextrose 5%. 1000 milliLiter(s) (100 mL/Hr) IV Continuous <Continuous>  dextrose 5%. 1000 milliLiter(s) (50 mL/Hr) IV Continuous <Continuous>  dextrose 50% Injectable 25 Gram(s) IV Push once  dextrose 50% Injectable 12.5 Gram(s) IV Push once  dextrose 50% Injectable 25 Gram(s) IV Push once  glucagon  Injectable 1 milliGRAM(s) IntraMuscular once  insulin lispro (ADMELOG) corrective regimen sliding scale   SubCutaneous every 6 hours  insulin NPH human recombinant 7 Unit(s) SubCutaneous every 6 hours  meropenem  IVPB      meropenem  IVPB 500 milliGRAM(s) IV Intermittent every 12 hours  norepinephrine Infusion 0.05 MICROgram(s)/kG/Min (3.56 mL/Hr) IV Continuous <Continuous>  phenylephrine    Infusion 0.1 MICROgram(s)/kG/Min (2.85 mL/Hr) IV Continuous <Continuous>  polyethylene glycol 3350 17 Gram(s) Oral two times a day  propofol Infusion 65.789 MICROgram(s)/kG/Min (30 mL/Hr) IV Continuous <Continuous>  senna 2 Tablet(s) Oral at bedtime  vasopressin Infusion 0.04 Unit(s)/Min (6 mL/Hr) IV Continuous <Continuous>    MEDICATIONS  (PRN):  acetaminophen     Tablet .. 650 milliGRAM(s) Oral every 6 hours PRN Temp greater or equal to 38C (100.4F), Mild Pain (1 - 3), Moderate Pain (4 - 6)  dextrose Oral Gel 15 Gram(s) Oral once PRN Blood Glucose LESS THAN 70 milliGRAM(s)/deciliter    ALLERGIES:  Allergies  No Known Allergies  Intolerances    VITAL SIGNS:  ICU Vital Signs Last 24 Hrs  T(C): 37.6 (18 Jan 2023 04:00), Max: 38.3 (17 Jan 2023 11:00)  T(F): 99.6 (18 Jan 2023 04:00), Max: 100.9 (17 Jan 2023 11:00)  HR: 82 (18 Jan 2023 07:40) (82 - 98)  BP: 141/65 (18 Jan 2023 04:00) (128/84 - 149/72)  BP(mean): 87 (18 Jan 2023 04:00) (87 - 100)  ABP: 123/54 (18 Jan 2023 07:40) (113/53 - 144/62)  ABP(mean): 80 (18 Jan 2023 07:40) (73 - 92)  RR: 26 (18 Jan 2023 07:40) (21 - 26)  SpO2: 94% (18 Jan 2023 07:40) (89% - 100%)    O2 Parameters below as of 18 Jan 2023 07:30    O2 Concentration (%): 50    Mode: AC/ CMV (Assist Control/ Continuous Mandatory Ventilation), RR (machine): 26, TV (machine): 500, FiO2: 50, PEEP: 10, MAP: 18, PIP: 35  Plateau pressure:   P/F ratio:     01-17 @ 07:01  -  01-18 @ 07:00  --------------------------------------------------------  IN: 3332.8 mL / OUT: 2300 mL / NET: 1032.8 mL    01-18 @ 07:01  -  01-18 @ 07:41  --------------------------------------------------------  IN: 35 mL / OUT: 0 mL / NET: 35 mL    CAPILLARY BLOOD GLUCOSE    POCT Blood Glucose.: 271 mg/dL (18 Jan 2023 04:41)    ECG:    PHYSICAL EXAM:  GENERAL: intubated  HEENT: EOMI. PERRL  NECK: Supple, no JVD  HEART: S1, S2, regular rate and rhythm, no murmurs, rubs, or gallops  LUNGS: +b/l rales, rhonchi  ABDOMEN: Soft, nontender, nondistended, +BS  EXTREMITIES: nonpalpable peripheral pulses. 1+ pitting edema b/l LE and UE  NERVOUS SYSTEM: intubated  SKIN: mottling noted on lower extremities, dusky toes and fingers    LABS:                        8.4    15.49 )-----------( 26       ( 18 Jan 2023 03:15 )             26.2     01-18    136  |  96<L>  |  76<H>  ----------------------------<  265<H>  3.1<L>   |  23  |  3.68<H>    Ca    7.1<L>      18 Jan 2023 03:15  Phos  4.9     01-18  Mg     2.20     01-18    TPro  5.7<L>  /  Alb  1.9<L>  /  TBili  1.6<H>  /  DBili  x   /  AST  259<H>  /  ALT  119<H>  /  AlkPhos  108  01-18    PT/INR - ( 18 Jan 2023 03:15 )   PT: 11.0 sec;   INR: 0.95 ratio      PTT - ( 18 Jan 2023 03:15 )  PTT:36.6 sec    RADIOLOGY & ADDITIONAL TESTS:

## 2023-01-18 NOTE — CHART NOTE - NSCHARTNOTEFT_GEN_A_CORE
: Zain TOVAR    INDICATION:    PROCEDURE:  [x] LIMITED ECHO  [x] LIMITED CHEST  [ ] LIMITED RETROPERITONEAL  [ ] LIMITED ABDOMINAL  [ ] LIMITED DVT  [ ] NEEDLE GUIDANCE VASCULAR  [ ] NEEDLE GUIDANCE THORACENTESIS  [ ] NEEDLE GUIDANCE PARACENTESIS  [ ] NEEDLE GUIDANCE PERICARDIOCENTESIS  [ ] OTHER    FINDINGS:     Lung: Bilobar consolidation but otherwise predominant A line pattern    Cardiac: Normal GDE    INTERPRETATION:    C/W bacterial PNA and not COVID ARDS No cardiac limitation    I was present during the key portions of the procedure and immediately available during the entire procedure.  Zain TOVAR  Attending

## 2023-01-18 NOTE — PROGRESS NOTE ADULT - SUBJECTIVE AND OBJECTIVE BOX
[facility] NEPHROLOGY [facility]ACT[facility]   MD CATRINA ZUÑIGA MD KRISTINE SOLTANPOUR, DO ANGELA WONG, PA    TEL:  OFFICE: [phone]  From 5pm-7am Answering Service [phone]    -- RENAL FOLLOW UP NOTE ---Date of Service 01-18-23 @ 12:27    Patient is a 82y old  Male who presents with a chief complaint of COVID (18 Jan 2023 07:41)      Patient seen and examined at bedside.     VITALS:  T(F): 99.8 (01-18-23 @ 12:00), Max: 100.7 (01-17-23 @ 20:00)  HR: 81 (01-18-23 @ 12:00)  BP: 148/60 (01-18-23 @ 12:00)  RR: 26 (01-18-23 @ 12:00)  SpO2: 96% (01-18-23 @ 12:00)  Wt(kg): --    01-17 @ 07:01  -  01-18 @ 07:00  --------------------------------------------------------  IN: 3484.9 mL / OUT: 2350 mL / NET: 1134.9 mL    01-18 @ 07:01  -  01-18 @ 12:27  --------------------------------------------------------  IN: 380.4 mL / OUT: 650 mL / NET: -269.6 mL          PHYSICAL EXAM:  General: intubated  Neck: No JVD  Respiratory: CTAB, no wheezes, rales or rhonchi  Cardiovascular: S1, S2, RRR  Gastrointestinal: BS+, soft, NT/ND  Extremities: No peripheral edema    Hospital Medications:   MEDICATIONS  (STANDING):  atorvastatin 20 milliGRAM(s) Oral at bedtime  budesonide  80 MICROgram(s)/formoterol 4.5 MICROgram(s) Inhaler 2 Puff(s) Inhalation two times a day  chlorhexidine 2% Cloths 1 Application(s) Topical daily  dextrose 5%. 1000 milliLiter(s) (100 mL/Hr) IV Continuous <Continuous>  dextrose 5%. 1000 milliLiter(s) (50 mL/Hr) IV Continuous <Continuous>  dextrose 50% Injectable 25 Gram(s) IV Push once  dextrose 50% Injectable 12.5 Gram(s) IV Push once  dextrose 50% Injectable 25 Gram(s) IV Push once  glucagon  Injectable 1 milliGRAM(s) IntraMuscular once  insulin lispro (ADMELOG) corrective regimen sliding scale   SubCutaneous every 6 hours  insulin NPH human recombinant 7 Unit(s) SubCutaneous every 6 hours  meropenem  IVPB      meropenem  IVPB 500 milliGRAM(s) IV Intermittent every 12 hours  norepinephrine Infusion 0.05 MICROgram(s)/kG/Min (3.56 mL/Hr) IV Continuous <Continuous>  phenylephrine    Infusion 0.1 MICROgram(s)/kG/Min (2.85 mL/Hr) IV Continuous <Continuous>  polyethylene glycol 3350 17 Gram(s) Oral two times a day  propofol Infusion 65.789 MICROgram(s)/kG/Min (30 mL/Hr) IV Continuous <Continuous>  senna 2 Tablet(s) Oral at bedtime  vasopressin Infusion 0.04 Unit(s)/Min (6 mL/Hr) IV Continuous <Continuous>      LABS:  01-18    136  |  96<L>  |  76<H>  ----------------------------<  265<H>  3.1<L>   |  23  |  3.68<H>    Ca    7.1<L>      18 Jan 2023 03:15  Phos  4.9     01-18  Mg     2.20     01-18    TPro  5.7<L>  /  Alb  1.9<L>  /  TBili  1.6<H>  /  DBili      /  AST  259<H>  /  ALT  119<H>  /  AlkPhos  108  01-18    Creatinine Trend: 3.68 <--, 3.38 <--, 3.44 <--, 3.84 <--, 4.23 <--, 4.34 <--, 4.59 <--, 4.33 <--, 4.34 <--, 3.87 <--, 2.86 <--    Albumin, Serum: 1.9 g/dL (01-18 @ 03:15)  Phosphorus Level, Serum: 4.9 mg/dL (01-18 @ 03:15)                              8.4    15.49 )-----------( 26 ( 18 Jan 2023 03:15 )             26.2     Urine Studies:  Urinalysis - [01-13-23 @ 05:34]      Color Yellow / Appearance Slightly Turbid / SG 1.015 / pH 5.5      Gluc Negative / Ketone Negative  / Bili Negative / Urobili <2 mg/dL       Blood Negative / Protein 30 mg/dL / Leuk Est Negative / Nitrite Negative      RBC 2 / WBC 1 / Hyaline 6 / Gran  / Sq Epi  / Non Sq Epi 2 / Bacteria Negative    Urine Creatinine 189      [01-13-23 @ 18:41]  Urine Protein 30      [01-13-23 @ 18:41]  Urine Sodium 31      [01-13-23 @ 18:41]  Urine Urea Nitrogen 527.0      [01-13-23 @ 18:41]    Ferritin 612      [01-14-23 @ 04:45]  HbA1c 7.0      [05-26-18 @ 16:34]  TSH 0.59      [01-14-23 @ 14:00]  Lipid: chol 60, , HDL 36, LDL --      [01-14-23 @ 04:45]        RADIOLOGY & ADDITIONAL STUDIES:

## 2023-01-18 NOTE — PROGRESS NOTE ADULT - ASSESSMENT
====================ASSESSMENT======================  82M with HTN, DM2, HLD, Alzheimer's dementia who presented to the hospital initially for COVID PNA, also found to be in CHIARA s/p RRT for worsening AMS and hypoxemic respiratory failure, currently admitted to the MICU intubated and on multi-pressor support. CT chest w/ signs of multifocal PNA and likely aspiration as well as emphysema.    Plan:  ====================NEUROLOGY=======================  #Mental Status: AAOx1 on admission to hospital (baseline AAOx3)   - CT head negative on admission. Repeat CT head done (1/14) prior to MICU transfer with no acute changes.  - likely 2/2 metabolic encephalopathia i/s/o new COVID-19 pneumonia  - pt. currently intubated and sedated  - c/w propofol for sedation    ====================RESPIRATORY======================  #Multifocal PNA   - acute hypoxic respiratory failure/sepsis 2/2 multifocal pneumonia, likely 2/2 aspiration. Pt. currently intubated and sedated  - BCx (1/13): NGTD, MRSA negative, legionella negative  - CT chest: multifocal consolidations most predominant in the left upper and right  lower lobes which may be due to infection. Additional possibility of a prior aspiration event should also be entertained given that there is extensive endobronchial secretions and impaction in the dependent posterior portion of the right lower lobe and dependent portions of the right upper lobe  - vent settings: volume A/C, RR 26, , FiO2 40%, PEEP 10  - s/p Zosyn (1/13-1/15), vancomycin (1/13-1/15; d/c'd as MRSA negative) and azithromycin (1/14-1/16; d/c'd as Legionella negative)  - c/w meropenem (1/15- ) for now, likely 7d course  - c/w propofol for sedation for goal RASS 0 to -1    #COVID Infection  - PCR positive 1/13, home test positive 1/11  - holding Remdesivir in setting of kidney failure  - d/c'd dexamethasone 6mg qd (1/13-1/17)    #Emphysema  - seen on CT chest by MICU team, likely 2/2 hx of smoking  - c/w Symbicort BID     ====================CARDIOVASCULAR==================  #Multifactorial Shock  - vasoplegic shock i/s/o sepsis +/- mild cardiogenic component, currently on 3 pressors  - wean pressors as tolerated  - holding home BP and CHF meds as below  - cortisol level 63  - monitor I/Os, daily weights, SCr, and urine output    #Hx of CHF  - diastolic disfunction grade 1-2 on POCUS  - TTE (1/15): calcified aortic valve, grossly moderate segmental LV systolic dysfunction, inferior and inferolateral wall hypokinesis. RV possibly hypokinetic  - holding home Entresto and torsemide in setting of CHIARA  - holding home carvedilol in setting of sepsis and hypotension   - monitor I/Os, daily weights  - replete electrolytes for K>4, Mg>2    #HLD (hyperlipidemia).   - c/w atorvastatin 20 mg- will give meds    #HTN (hypertension)  - BP low on admission and patient is currently in shock and on multiple pressors  - holding home carvedilol, Entresto, amlodipine in setting of sepsis and hypotension  - continue to monitor BP    ====================/RENAL========================  #CHIARA on CKD  - baseline SCr 2.1 in 12/2022 per Nephrology. UA without evidence of infection, currently improving  - appreciate nephrology recommendations- due to pressor requirements and hypovolemia, will hold off on HD for now  - monitor I/Os, daily weights, SCr, and urine output    #Electrolytes  - Maintain K>4, Phos>3, Mag>2, iCal>1    ====================GI/NUTRITION=====================  #Diet:  - NPO w/ tube feeds  - bowel regimen: Senna, Miralax    ====================INFECTIOUS DISEASE================  Pt. currently afebrile and w/o leukocytosis  #COVID Infection  - PCR positive 1/13, home test positive 1/11  - holding Remdesivir in setting of kidney failure  - d/c'd dexamethasone 6mg qd (1/13-1/17)    #Multifocal PNA   - acute hypoxic respiratory failure/sepsis 2/2 multifocal pneumonia, likely 2/2 aspiration. Pt. currently intubated and sedated  - BCx (1/13): NGTD, MRSA negative, legionella negative  - CT chest: multifocal consolidations most predominant in the left upper and right  lower lobes which may be due to infection. Additional possibility of a prior aspiration event should also be entertained given that there is extensive endobronchial secretions and impaction in the dependent posterior portion of the right lower lobe and dependent portions of the right upper lobe  - vent settings: volume A/C, RR 26, , FiO2 40%, PEEP 10  - s/p Zosyn (1/13-1/15), vancomycin (1/13-1/15; d/c'd as MRSA negative) and azithromycin (1/14-1/16; d/c'd as Legionella negative)  - c/w meropenem (1/15- ) for now, likely 7d course  - c/w propofol for sedation for goal RASS 0 to -1    ====================ENDOCRINE=======================  #DM2  - on Farxiga at home, unclear if for CHF vs DM  - A1c 6.3%  - while NPO, FSBG and moderate ISS q6h  - c/w NPH 5U q6h given persistent BG > 200s, likely 2/2 dexamethasone  - continue to monitor BGs    ====================HEMATOLOGIC/DVT PPx=============  #Thrombocytopenia  - plt 110 on admission, now downtrending, may be 2/2 acute sepsis vs. less likely HIT given prior heparin use  - PF4 negative; f/u JASPREET  - will consent and transfuse 1U plt  - holding heparin for now    #DVT PPx  - holding for now given thrombocytopenia  - SCDs    ====================ETHICS===========================  Code Status: DNR  ====================ASSESSMENT======================  82M with HTN, DM2, HLD, Alzheimer's dementia who presented to the hospital initially for COVID PNA, also found to be in CHIARA s/p RRT for worsening AMS and hypoxemic respiratory failure, currently admitted to the MICU intubated and on multi-pressor support. CT chest w/ signs of multifocal PNA and likely aspiration as well as emphysema.    Plan:  ====================NEUROLOGY=======================  #Mental Status: AAOx1 on admission to hospital (baseline AAOx3)   - CT head negative on admission. Repeat CT head done (1/14) prior to MICU transfer with no acute changes.  - likely 2/2 metabolic encephalopathia i/s/o new COVID-19 pneumonia  - pt. currently intubated and sedated  - c/w propofol for sedation    ====================RESPIRATORY======================  #Multifocal PNA   - acute hypoxic respiratory failure/sepsis 2/2 multifocal pneumonia, likely 2/2 aspiration. Pt. currently intubated and sedated  - BCx (1/13): NGTD, MRSA negative, legionella negative  - CT chest: multifocal consolidations most predominant in the left upper and right  lower lobes which may be due to infection. Additional possibility of a prior aspiration event should also be entertained given that there is extensive endobronchial secretions and impaction in the dependent posterior portion of the right lower lobe and dependent portions of the right upper lobe  - vent settings: volume A/C, RR 26, , FiO2 50%, PEEP 10   - s/p Zosyn (1/13-1/15), vancomycin (1/13-1/15; d/c'd as MRSA negative) and azithromycin (1/14-1/16; d/c'd as Legionella negative)  - c/w meropenem (1/15- ) for now, likely 7d course  - c/w propofol for sedation    #COVID Infection  - PCR positive 1/13, home test positive 1/11  - holding Remdesivir in setting of kidney failure  - d/c'd dexamethasone 6mg qd (1/13-1/17)    #Emphysema  - seen on CT chest by MICU team, likely 2/2 hx of smoking  - c/w Symbicort BID     ====================CARDIOVASCULAR==================  #Multifactorial Shock  - vasoplegic shock i/s/o sepsis +/- mild cardiogenic component, currently on multiple pressors. Now w/ skin mottling and dusky digits noted 1/18  - cortisol level 63  - f/u duplex arterial studies upper and lower ext  - wean pressors as tolerated  - holding home BP and CHF meds as below  - monitor I/Os, daily weights, SCr, and urine output    #Hx of CHF  - diastolic disfunction grade 1-2 on POCUS  - TTE (1/15): calcified aortic valve, grossly moderate segmental LV systolic dysfunction, inferior and inferolateral wall hypokinesis. RV possibly hypokinetic  - holding home Entresto and torsemide in setting of CHIARA  - holding home carvedilol in setting of sepsis and hypotension   - monitor I/Os, daily weights  - replete electrolytes for K>4, Mg>2    #HLD (hyperlipidemia).   - c/w atorvastatin 20 mg- will give meds    #HTN (hypertension)  - BP low on admission and patient is currently in shock and on multiple pressors  - holding home carvedilol, Entresto, amlodipine in setting of sepsis and hypotension  - continue to monitor BP    ====================/RENAL========================  #CHIARA on CKD  - baseline SCr 2.1 in 12/2022 per Nephrology. UA without evidence of infection, currently improving  - appreciate nephrology recommendations- due to pressor requirements and hypovolemia, will hold off on HD for now  - monitor I/Os, daily weights, SCr, and urine output    #Electrolytes  - Maintain K>4, Phos>3, Mag>2, iCal>1    ====================GI/NUTRITION=====================  #Diet:  - NPO w/ tube feeds  - bowel regimen: Senna, Miralax    ====================INFECTIOUS DISEASE================  Pt. currently afebrile and w/o leukocytosis  #COVID Infection  - PCR positive 1/13, home test positive 1/11  - holding Remdesivir in setting of kidney failure  - d/c'd dexamethasone 6mg qd (1/13-1/17)    #Multifocal PNA   - acute hypoxic respiratory failure/sepsis 2/2 multifocal pneumonia, likely 2/2 aspiration. Pt. currently intubated and sedated  - BCx (1/13): NGTD, MRSA negative, legionella negative  - CT chest: multifocal consolidations most predominant in the left upper and right  lower lobes which may be due to infection. Additional possibility of a prior aspiration event should also be entertained given that there is extensive endobronchial secretions and impaction in the dependent posterior portion of the right lower lobe and dependent portions of the right upper lobe  - vent settings: volume A/C, RR 26, , FiO2 50%, PEEP 10  - s/p Zosyn (1/13-1/15), vancomycin (1/13-1/15; d/c'd as MRSA negative) and azithromycin (1/14-1/16; d/c'd as Legionella negative)  - c/w meropenem (1/15- ) for now, likely 7d course  - c/w propofol for sedation for goal RASS 0 to -1    ====================ENDOCRINE=======================  #DM2  - on Farxiga at home, unclear if for CHF vs DM  - A1c 6.3%  - while NPO, FSBG and moderate ISS q6h  - c/w NPH 7U q6h given persistent BG > 200s, likely 2/2 dexamethasone  - continue to monitor BGs    ====================HEMATOLOGIC/DVT PPx=============  #Thrombocytopenia  - plt 110 on admission, now downtrending, may be 2/2 acute sepsis vs. less likely HIT given prior heparin use  - PF4 negative; f/u JASPREET  - s/p 1U plt with good response   - holding heparin for now  - continue to monitor and transfuse for plt<10k, <20k and febrile, or <50k and actively bleeding    #DVT PPx  - holding for now given thrombocytopenia  - SCDs    ====================ETHICS===========================  Code Status: DNR

## 2023-01-19 NOTE — PROGRESS NOTE ADULT - SUBJECTIVE AND OBJECTIVE BOX
[facility]   MD CATRINA ZUÑIGA MD KRISTINE SOLTANPOUR, DO ANGELA WONG, PA    TEL:  OFFICE: [phone]  From 5pm-7am Answering Service [phone]    -- RENAL FOLLOW UP NOTE ---Date of Service 01-19-23 @ 15:02    Patient is a 82y old  Male who presents with a chief complaint of COVID (19 Jan 2023 07:29)      Patient seen and examined at bedside.     VITALS:  T(F): 98.7 (01-19-23 @ 12:00), Max: 99.9 (01-18-23 @ 16:00)  HR: 70 (01-19-23 @ 14:00)  BP: 125/56 (01-19-23 @ 12:00)  RR: 27 (01-19-23 @ 14:00)  SpO2: 94% (01-19-23 @ 14:00)  Wt(kg): --    01-18 @ 07:01  -  01-19 @ 07:00  --------------------------------------------------------  IN: 1521.4 mL / OUT: 1470 mL / NET: 51.4 mL    01-19 @ 07:01  -  01-19 @ 15:02  --------------------------------------------------------  IN: 129.3 mL / OUT: 15 mL / NET: 114.3 mL          PHYSICAL EXAM:  General: intubated  Neck: No JVD  Respiratory: CTAB, no wheezes, rales or rhonchi  Cardiovascular: S1, S2, RRR  Gastrointestinal: BS+, soft, NT/ND  Extremities: No peripheral edema    Hospital Medications:   MEDICATIONS  (STANDING):  atorvastatin 20 milliGRAM(s) Oral at bedtime  budesonide  80 MICROgram(s)/formoterol 4.5 MICROgram(s) Inhaler 2 Puff(s) Inhalation two times a day  chlorhexidine 2% Cloths 1 Application(s) Topical daily  dextrose 5%. 1000 milliLiter(s) (100 mL/Hr) IV Continuous <Continuous>  dextrose 5%. 1000 milliLiter(s) (50 mL/Hr) IV Continuous <Continuous>  dextrose 50% Injectable 25 Gram(s) IV Push once  dextrose 50% Injectable 12.5 Gram(s) IV Push once  dextrose 50% Injectable 25 Gram(s) IV Push once  glucagon  Injectable 1 milliGRAM(s) IntraMuscular once  insulin lispro (ADMELOG) corrective regimen sliding scale   SubCutaneous every 6 hours  insulin NPH human recombinant 7 Unit(s) SubCutaneous every 6 hours  meropenem  IVPB 500 milliGRAM(s) IV Intermittent every 12 hours  meropenem  IVPB      norepinephrine Infusion 0.05 MICROgram(s)/kG/Min (3.56 mL/Hr) IV Continuous <Continuous>  polyethylene glycol 3350 17 Gram(s) Oral two times a day  propofol Infusion 65.789 MICROgram(s)/kG/Min (30 mL/Hr) IV Continuous <Continuous>  senna 2 Tablet(s) Oral at bedtime  vasopressin Infusion 0.04 Unit(s)/Min (6 mL/Hr) IV Continuous <Continuous>      LABS:  01-19    138  |  101  |  91<H>  ----------------------------<  206<H>  3.0<L>   |  22  |  4.37<H>    Ca    7.0<L>      19 Jan 2023 00:21  Phos  5.4     01-19  Mg     2.10     01-19    TPro  5.3<L>  /  Alb  1.7<L>  /  TBili  1.0  /  DBili      /  AST  237<H>  /  ALT  94<H>  /  AlkPhos  106  01-19    Creatinine Trend: 4.37 <--, 3.68 <--, 3.38 <--, 3.44 <--, 3.84 <--, 4.23 <--, 4.34 <--, 4.59 <--, 4.33 <--, 4.34 <--, 3.87 <--, 2.86 <--    Albumin, Serum: 1.7 g/dL (01-19 @ 00:21)  Phosphorus Level, Serum: 5.4 mg/dL (01-19 @ 00:21)                              7.5    15.62 )-----------( 18 ( 19 Jan 2023 00:21 )             23.7     Urine Studies:  Urinalysis - [01-13-23 @ 05:34]      Color Yellow / Appearance Slightly Turbid / SG 1.015 / pH 5.5      Gluc Negative / Ketone Negative  / Bili Negative / Urobili <2 mg/dL       Blood Negative / Protein 30 mg/dL / Leuk Est Negative / Nitrite Negative      RBC 2 / WBC 1 / Hyaline 6 / Gran  / Sq Epi  / Non Sq Epi 2 / Bacteria Negative    Urine Creatinine 189      [01-13-23 @ 18:41]  Urine Protein 30      [01-13-23 @ 18:41]  Urine Sodium 31      [01-13-23 @ 18:41]  Urine Urea Nitrogen 527.0      [01-13-23 @ 18:41]    Ferritin 612      [01-14-23 @ 04:45]  HbA1c 7.0      [05-26-18 @ 16:34]  TSH 0.59      [01-14-23 @ 14:00]  Lipid: chol 60, , HDL 36, LDL --      [01-14-23 @ 04:45]        RADIOLOGY & ADDITIONAL STUDIES:

## 2023-01-19 NOTE — PROGRESS NOTE ADULT - ASSESSMENT
====================ASSESSMENT======================  82M with HTN, DM2, HLD, Alzheimer's dementia who presented to the hospital initially for COVID PNA, also found to be in CHIARA s/p RRT for worsening AMS and hypoxemic respiratory failure, currently admitted to the MICU intubated and on multi-pressor support. CT chest w/ signs of multifocal PNA and likely aspiration as well as emphysema.    Plan:  ====================NEUROLOGY=======================  #Mental Status: AAOx1 on admission to hospital (baseline AAOx3)   - CT head negative on admission. Repeat CT head done (1/14) prior to MICU transfer with no acute changes.  - likely 2/2 metabolic encephalopathia i/s/o new COVID-19 pneumonia  - pt. currently intubated and sedated  - c/w propofol for sedation for goal RASS 0 to -1    ====================RESPIRATORY======================  #Multifocal PNA   - acute hypoxic respiratory failure/sepsis 2/2 multifocal pneumonia, likely 2/2 aspiration. Pt. currently intubated and sedated  - BCx (1/13): NGTD, MRSA negative, legionella negative  - CT chest: multifocal consolidations most predominant in the left upper and right  lower lobes which may be due to infection. Additional possibility of a prior aspiration event should also be entertained given that there is extensive endobronchial secretions and impaction in the dependent posterior portion of the right lower lobe and dependent portions of the right upper lobe  - vent settings: volume A/C, RR 26, , FiO2 50%, PEEP 10   - s/p Zosyn (1/13-1/15), vancomycin (1/13-1/15; d/c'd as MRSA negative) and azithromycin (1/14-1/16; d/c'd as Legionella negative)  - c/w meropenem (1/15- ) for now, likely 7d course    #COVID Infection  - PCR positive 1/13, home test positive 1/11  - holding Remdesivir in setting of kidney failure  - d/c'd dexamethasone 6mg qd (1/13-1/17)    #Emphysema  - seen on CT chest by MICU team, likely 2/2 hx of smoking  - c/w Symbicort BID     ====================CARDIOVASCULAR==================  #Multifactorial Shock  - vasoplegic shock i/s/o sepsis +/- mild cardiogenic component, currently on multiple pressors. Now w/ skin mottling and dusky digits noted 1/18  - cortisol level 63  - VA duplex arterial UE/LE (1/18): no evidence of arterial occlusion  - wean pressors as tolerated  - holding home BP and CHF meds as below  - monitor I/Os, daily weights, SCr, and urine output    #Hx of CHF  - diastolic disfunction grade 1-2 on POCUS  - TTE (1/15): calcified aortic valve, grossly moderate segmental LV systolic dysfunction, inferior and inferolateral wall hypokinesis. RV possibly hypokinetic  - holding home Entresto and torsemide in setting of CHIARA  - holding home carvedilol in setting of sepsis and hypotension   - monitor I/Os, daily weights  - replete electrolytes for K>4, Mg>2    #HLD (hyperlipidemia).   - c/w atorvastatin 20 mg qd    #HTN (hypertension)  - BP low on admission and patient is currently in shock and on multiple pressors  - holding home carvedilol, Entresto, amlodipine in setting of sepsis and hypotension  - continue to monitor BP    ====================/RENAL========================  #CHIARA on CKD  - baseline SCr 2.1 in 12/2022 per Nephrology. UA without evidence of infection, currently improving  - appreciate nephrology recommendations- due to pressor requirements and hypovolemia, will hold off on HD for now  - monitor I/Os, daily weights, SCr, and urine output    #Electrolytes  - Maintain K>4, Phos>3, Mag>2, iCal>1    ====================GI/NUTRITION=====================  #Diet:  - NPO w/ tube feeds  - bowel regimen: Senna, Miralax    ====================INFECTIOUS DISEASE================  #COVID Infection  - PCR positive 1/13, home test positive 1/11  - holding Remdesivir in setting of kidney failure  - d/c'd dexamethasone 6mg qd (1/13-1/17)    #Multifocal PNA   - acute hypoxic respiratory failure/sepsis 2/2 multifocal pneumonia, likely 2/2 aspiration. Pt. currently intubated and sedated  - BCx (1/13): NGTD, MRSA negative, legionella negative  - CT chest: multifocal consolidations most predominant in the left upper and right  lower lobes which may be due to infection. Additional possibility of a prior aspiration event should also be entertained given that there is extensive endobronchial secretions and impaction in the dependent posterior portion of the right lower lobe and dependent portions of the right upper lobe  - vent settings: volume A/C, RR 26, , FiO2 50%, PEEP 10  - s/p Zosyn (1/13-1/15), vancomycin (1/13-1/15; d/c'd as MRSA negative) and azithromycin (1/14-1/16; d/c'd as Legionella negative)  - c/w meropenem (1/15- ) for now, likely 7d course    ====================ENDOCRINE=======================  #DM2  - on Farxiga at home, unclear if for CHF vs DM  - A1c 6.3%  - while NPO, FSBG and moderate ISS q6h  - c/w NPH 7U q6h given persistent BG > 200s, likely 2/2 dexamethasone  - continue to monitor BGs    ====================HEMATOLOGIC/DVT PPx=============  #Thrombocytopenia  - plt 110 on admission, now downtrending, may be 2/2 acute sepsis vs. less likely HIT given prior heparin use  - PF4 negative; JASPREET negative- unlikely to be HIT  - s/p 1U plt with good response   - holding heparin for now  - continue to monitor and transfuse for plt<10k, <20k and febrile, or <50k and actively bleeding    #DVT PPx  - holding for now given thrombocytopenia  - SCDs    ====================ETHICS===========================  Code Status: DNR  ====================ASSESSMENT======================  82M with HTN, DM2, HLD, Alzheimer's dementia who presented to the hospital initially for COVID PNA, also found to be in CHIARA s/p RRT for worsening AMS and hypoxemic respiratory failure, currently admitted to the MICU intubated and on multi-pressor support. CT chest w/ signs of multifocal PNA and likely aspiration as well as emphysema.    Plan:  ====================NEUROLOGY=======================  #Mental Status: AAOx1 on admission to hospital (baseline AAOx3)   - CT head negative on admission. Repeat CT head done (1/14) prior to MICU transfer with no acute changes.  - likely 2/2 metabolic encephalopathia i/s/o new COVID-19 pneumonia  - pt. currently intubated and sedated  - c/w propofol for sedation for goal RASS 0 to -1    ====================RESPIRATORY======================  #Multifocal PNA   - acute hypoxic respiratory failure/sepsis 2/2 multifocal pneumonia, likely 2/2 aspiration. Pt. currently intubated and sedated  - BCx (1/13): NGTD, MRSA negative, legionella negative  - CT chest: multifocal consolidations most predominant in the left upper and right  lower lobes which may be due to infection. Additional possibility of a prior aspiration event should also be entertained given that there is extensive endobronchial secretions and impaction in the dependent posterior portion of the right lower lobe and dependent portions of the right upper lobe  - vent settings: volume A/C, RR 26, , FiO2 50%, PEEP 10   - s/p Zosyn (1/13-1/15), vancomycin (1/13-1/15; d/c'd as MRSA negative) and azithromycin (1/14-1/16; d/c'd as Legionella negative)  - c/w meropenem (1/15- ) for now, likely 7d course    #COVID Infection  - PCR positive 1/13, home test positive 1/11  - holding Remdesivir in setting of kidney failure  - d/c'd dexamethasone 6mg qd (1/13-1/17)    #Emphysema  - seen on CT chest by MICU team, likely 2/2 hx of smoking  - c/w Symbicort BID     ====================CARDIOVASCULAR==================  #Multifactorial Shock  - vasoplegic shock i/s/o sepsis +/- mild cardiogenic component, currently on multiple pressors. Now w/ skin mottling and dusky digits noted 1/18  - cortisol level 63  - VA duplex arterial UE/LE (1/18): no evidence of arterial occlusion  - wean pressors as tolerated  - holding home BP and CHF meds as below  - monitor I/Os, daily weights, SCr, and urine output    #Hx of CHF  - diastolic disfunction grade 1-2 on POCUS  - TTE (1/15): calcified aortic valve, grossly moderate segmental LV systolic dysfunction, inferior and inferolateral wall hypokinesis. RV possibly hypokinetic  - holding home Entresto and torsemide in setting of CHIARA  - holding home carvedilol in setting of sepsis and hypotension   - monitor I/Os, daily weights  - replete electrolytes for K>4, Mg>2    #HLD (hyperlipidemia).   - c/w atorvastatin 20 mg qd    #HTN (hypertension)  - BP low on admission and patient is currently in shock and on multiple pressors  - holding home carvedilol, Entresto, amlodipine in setting of sepsis and hypotension  - continue to monitor BP    ====================/RENAL========================  #CHIARA on CKD  - baseline SCr 2.1 in 12/2022 per Nephrology. UA without evidence of infection,  - appreciate nephrology recommendations- due to pressor requirements and hypovolemia, will hold off on HD for now  - monitor I/Os, daily weights, SCr, and urine output    #Electrolytes  - Maintain K>4, Phos>3, Mag>2, iCal>1    ====================GI/NUTRITION=====================  #Diet:  - NPO w/ tube feeds  - bowel regimen: Senna, Miralax    ====================INFECTIOUS DISEASE================  #COVID Infection  - PCR positive 1/13, home test positive 1/11  - holding Remdesivir in setting of kidney failure  - d/c'd dexamethasone 6mg qd (1/13-1/17)    #Multifocal PNA   - acute hypoxic respiratory failure/sepsis 2/2 multifocal pneumonia, likely 2/2 aspiration. Pt. currently intubated and sedated  - BCx (1/13): NGTD, MRSA negative, legionella negative  - CT chest: multifocal consolidations most predominant in the left upper and right  lower lobes which may be due to infection. Additional possibility of a prior aspiration event should also be entertained given that there is extensive endobronchial secretions and impaction in the dependent posterior portion of the right lower lobe and dependent portions of the right upper lobe  - vent settings: volume A/C, RR 26, , FiO2 50%, PEEP 10  - s/p Zosyn (1/13-1/15), vancomycin (1/13-1/15; d/c'd as MRSA negative) and azithromycin (1/14-1/16; d/c'd as Legionella negative)  - c/w meropenem (1/15- ) for now, likely 7d course    ====================ENDOCRINE=======================  #DM2  - on Farxiga at home, unclear if for CHF vs DM  - A1c 6.3%  - while NPO, FSBG and moderate ISS q6h  - c/w NPH 7U q6h given persistent BG > 200s, likely 2/2 dexamethasone  - continue to monitor BGs    ====================HEMATOLOGIC/DVT PPx=============  #Thrombocytopenia  - plt 110 on admission, now downtrending, may be 2/2 acute sepsis vs. less likely HIT given prior heparin use  - PF4 negative; JASPREET negative- unlikely to be HIT  - s/p 1U plt with good response   - holding heparin for now  - continue to monitor and transfuse for plt<10k, <20k and febrile, or <50k and actively bleeding    #DVT PPx  - holding for now given thrombocytopenia  - SCDs    ====================ETHICS===========================  Code Status: DNR

## 2023-01-19 NOTE — PROGRESS NOTE ADULT - SUBJECTIVE AND OBJECTIVE BOX
*******************************  Ger Toney MD (PGY-1)  Internal Medicine  Contact via [employer] TEAMS  [facility] Pager: [phone]  [facility] Pager: [phone]  *******************************    INTERVAL HPI/OVERNIGHT EVENTS: No acute events overnight.    SUBJECTIVE: Patient seen and examined at bedside.     MEDICATIONS  (STANDING):  atorvastatin 20 milliGRAM(s) Oral at bedtime  budesonide  80 MICROgram(s)/formoterol 4.5 MICROgram(s) Inhaler 2 Puff(s) Inhalation two times a day  chlorhexidine 2% Cloths 1 Application(s) Topical daily  dextrose 5%. 1000 milliLiter(s) (100 mL/Hr) IV Continuous <Continuous>  dextrose 5%. 1000 milliLiter(s) (50 mL/Hr) IV Continuous <Continuous>  dextrose 50% Injectable 25 Gram(s) IV Push once  dextrose 50% Injectable 12.5 Gram(s) IV Push once  dextrose 50% Injectable 25 Gram(s) IV Push once  glucagon  Injectable 1 milliGRAM(s) IntraMuscular once  insulin lispro (ADMELOG) corrective regimen sliding scale   SubCutaneous every 6 hours  insulin NPH human recombinant 7 Unit(s) SubCutaneous every 6 hours  meropenem  IVPB 500 milliGRAM(s) IV Intermittent every 12 hours  meropenem  IVPB      norepinephrine Infusion 0.05 MICROgram(s)/kG/Min (3.56 mL/Hr) IV Continuous <Continuous>  polyethylene glycol 3350 17 Gram(s) Oral two times a day  propofol Infusion 65.789 MICROgram(s)/kG/Min (30 mL/Hr) IV Continuous <Continuous>  senna 2 Tablet(s) Oral at bedtime  vasopressin Infusion 0.04 Unit(s)/Min (6 mL/Hr) IV Continuous <Continuous>    MEDICATIONS  (PRN):  acetaminophen     Tablet .. 650 milliGRAM(s) Oral every 6 hours PRN Temp greater or equal to 38C (100.4F), Mild Pain (1 - 3), Moderate Pain (4 - 6)  dextrose Oral Gel 15 Gram(s) Oral once PRN Blood Glucose LESS THAN 70 milliGRAM(s)/deciliter    ALLERGIES:  Allergies  No Known Allergies  Intolerances    VITAL SIGNS:  ICU Vital Signs Last 24 Hrs  T(C): 37.1 (19 Jan 2023 04:00), Max: 37.8 (18 Jan 2023 08:00)  T(F): 98.7 (19 Jan 2023 04:00), Max: 100.1 (18 Jan 2023 08:00)  HR: 73 (19 Jan 2023 06:42) (73 - 85)  BP: 148/60 (18 Jan 2023 12:00) (134/59 - 148/60)  BP(mean): 84 (18 Jan 2023 12:00) (81 - 84)  ABP: 99/44 (19 Jan 2023 06:00) (94/43 - 147/58)  ABP(mean): 63 (19 Jan 2023 06:00) (60 - 90)  RR: 26 (19 Jan 2023 06:00) (22 - 27)  SpO2: 96% (19 Jan 2023 06:42) (93% - 100%)    O2 Parameters below as of 19 Jan 2023 06:00  Patient On (Oxygen Delivery Method): ventilator    O2 Concentration (%): 50    Mode: AC/ CMV (Assist Control/ Continuous Mandatory Ventilation), RR (machine): 26, TV (machine): 500, FiO2: 50, PEEP: 10, ITime: 0.8, MAP: 18, PIP: 32  Plateau pressure:   P/F ratio:     01-18 @ 07:01  -  01-19 @ 07:00  --------------------------------------------------------  IN: 1478.3 mL / OUT: 1470 mL / NET: 8.3 mL    CAPILLARY BLOOD GLUCOSE    POCT Blood Glucose.: 181 mg/dL (19 Jan 2023 06:08)    ECG:    PHYSICAL EXAM:  GENERAL: intubated and sedated  HEENT: EOMI. PERRL  NECK: Supple, no JVD  HEART: S1, S2, regular rate and rhythm, no murmurs, rubs, or gallops  LUNGS: +b/l rales, rhonchi  ABDOMEN: Soft, nontender, nondistended, +BS  EXTREMITIES: nonpalpable peripheral pulses. 1+ pitting edema b/l LE and UE  NERVOUS SYSTEM: intubated and sedated  SKIN: mottling noted on lower extremities, dusky toes and fingers    LABS:                        7.5    15.62 )-----------( 18       ( 19 Jan 2023 00:21 )             23.7     01-19    138  |  101  |  91<H>  ----------------------------<  206<H>  3.0<L>   |  22  |  4.37<H>    Ca    7.0<L>      19 Jan 2023 00:21  Phos  5.4     01-19  Mg     2.10     01-19    TPro  5.3<L>  /  Alb  1.7<L>  /  TBili  1.0  /  DBili  x   /  AST  237<H>  /  ALT  94<H>  /  AlkPhos  106  01-19    PT/INR - ( 19 Jan 2023 00:21 )   PT: 10.3 sec;   INR: <0.90 ratio    PTT - ( 19 Jan 2023 00:21 )  PTT:36.1 sec    RADIOLOGY & ADDITIONAL TESTS:

## 2023-01-20 NOTE — PROGRESS NOTE ADULT - ASSESSMENT
82M DM, CAD, CHF, Dementia, CKD  bibems accompanied by daughter after a fall this evening. Nephrology consulted for acute on ckd    CHIARA on CKD stage 4  baseline ~ 2.1 12/2022  CHIARA possible ATN vs prerenal  renal function continue to worsne  diuretic ACE on hold  Feurea <35% suggestive of prerenal  s/p bumex 1/16  urine output decreased   started gentle hydration with NS @ 75cc/hr x1L  poor prognosis. pending GOC conversation   monitor  abd us without hydro  monitor bmp and urine output  avoid nephrotoxic agents  As per discussion regarding HD with family and ICU providers.  Patient is on multiple vasopressure-d/w wife and daughter and they don't want him to go through dialysis    proteinuria  mild  urine p/c ratio 0.2  not significant     acidosis  lactic acidosis   improving  monitor    hypokalemia  supplement per team  monitor    covid/pna  care per team

## 2023-01-20 NOTE — GOALS OF CARE CONVERSATION - ADVANCED CARE PLANNING - CONVERSATION DETAILS
Spoke with daughter, Leena Ag, at bedside regarding diagnosis and prognosis of her father Yohan Scott. Explained that he still remains intubated and dependent on multiple pressors and has now developed worsening skin mottling and dusky fingers/toes with worsening renal function. I explained that the patient's prognosis is very limited and any meaningful recovery is highly unlikely at this stage. In addition, the patient's renal function continues to deteriorate and there are no plans to start dialysis at the request of the family. We discussed next steps including capping pressors at current rates and holding further blood draws at this time. We also discussed comfort care, palliative extubation, and making the patient feel comfortable in their last few hours. The daughter expresses understanding regarding her father's poor overall prognosis and will speak to the rest of the family regarding comfort care/next steps. As of now, the patient remains DNR.     *******************************  Ger Toney MD (PGY-1)  Internal Medicine  Contact via [employer] TEAMS  [facility] Pager: [phone]  [facility] Pager: [phone]  *******************************

## 2023-01-20 NOTE — PROGRESS NOTE ADULT - SUBJECTIVE AND OBJECTIVE BOX
[facility] [place] NEPHROLOGY PRACTICE   MD CATRINA ZUÑIGA MD KRISTINE SOLTANPOUR, DO ANGELA WONG, PA    TEL:  OFFICE: [phone]  From 5pm-7am Answering Service [phone]    -- RENAL FOLLOW UP NOTE ---Date of Service 01-20-23 @ 13:39    Patient is a 82y old  Male who presents with a chief complaint of COVID (20 Jan 2023 07:40)      Patient seen and examined at bedside.     VITALS:  T(F): 98.4 (01-20-23 @ 04:00), Max: 98.7 (01-19-23 @ 20:00)  HR: 75 (01-20-23 @ 12:00)  BP: 141/73 (01-20-23 @ 12:00)  RR: 27 (01-20-23 @ 12:00)  SpO2: 97% (01-20-23 @ 12:00)  Wt(kg): --    01-19 @ 07:01  -  01-20 @ 07:00  --------------------------------------------------------  IN: 1203.4 mL / OUT: 710 mL / NET: 493.4 mL          PHYSICAL EXAM:  General: intubated  Neck: No JVD  Respiratory: CTAB, no wheezes, rales or rhonchi  Cardiovascular: S1, S2, RRR  Gastrointestinal: BS+, soft, NT/ND  Extremities: No peripheral edema, finger cold to touch, ischemic changes    Hospital Medications:   MEDICATIONS  (STANDING):  atorvastatin 20 milliGRAM(s) Oral at bedtime  chlorhexidine 2% Cloths 1 Application(s) Topical daily  dextrose 5%. 1000 milliLiter(s) (100 mL/Hr) IV Continuous <Continuous>  dextrose 5%. 1000 milliLiter(s) (50 mL/Hr) IV Continuous <Continuous>  dextrose 50% Injectable 25 Gram(s) IV Push once  dextrose 50% Injectable 12.5 Gram(s) IV Push once  dextrose 50% Injectable 25 Gram(s) IV Push once  glucagon  Injectable 1 milliGRAM(s) IntraMuscular once  insulin lispro (ADMELOG) corrective regimen sliding scale   SubCutaneous every 6 hours  insulin NPH human recombinant 7 Unit(s) SubCutaneous every 6 hours  meropenem  IVPB 500 milliGRAM(s) IV Intermittent every 12 hours  meropenem  IVPB      norepinephrine Infusion 0.05 MICROgram(s)/kG/Min (3.56 mL/Hr) IV Continuous <Continuous>  sodium chloride 0.9%. 1000 milliLiter(s) (75 mL/Hr) IV Continuous <Continuous>  vasopressin Infusion 0.04 Unit(s)/Min (6 mL/Hr) IV Continuous <Continuous>      LABS:  01-20    139  |  100  |  110<H>  ----------------------------<  162<H>  2.9<LL>   |  21<L>  |  5.15<H>    Ca    6.9<L>      20 Jan 2023 00:30  Phos  5.8     01-20  Mg     2.20     01-20    TPro  5.4<L>  /  Alb  1.8<L>  /  TBili  0.9  /  DBili      /  AST  213<H>  /  ALT  70<H>  /  AlkPhos  108  01-20    Creatinine Trend: 5.15 <--, 4.37 <--, 3.68 <--, 3.38 <--, 3.44 <--, 3.84 <--, 4.23 <--, 4.34 <--, 4.59 <--, 4.33 <--, 4.34 <--, 3.87 <--    Albumin, Serum: 1.8 g/dL (01-20 @ 00:30)  Phosphorus Level, Serum: 5.8 mg/dL (01-20 @ 00:30)                              7.4    14.13 )-----------( 22       ( 20 Jan 2023 00:30 )             22.9     Urine Studies:  Urinalysis - [01-13-23 @ 05:34]      Color Yellow / Appearance Slightly Turbid / SG 1.015 / pH 5.5      Gluc Negative / Ketone Negative  / Bili Negative / Urobili <2 mg/dL       Blood Negative / Protein 30 mg/dL / Leuk Est Negative / Nitrite Negative      RBC 2 / WBC 1 / Hyaline 6 / Gran  / Sq Epi  / Non Sq Epi 2 / Bacteria Negative    Urine Creatinine 189      [01-13-23 @ 18:41]  Urine Protein 30      [01-13-23 @ 18:41]  Urine Sodium 31      [01-13-23 @ 18:41]  Urine Urea Nitrogen 527.0      [01-13-23 @ 18:41]    Ferritin 612      [01-14-23 @ 04:45]  HbA1c 7.0      [05-26-18 @ 16:34]  TSH 0.59      [01-14-23 @ 14:00]  Lipid: chol 60, , HDL 36, LDL --      [01-14-23 @ 04:45]        RADIOLOGY & ADDITIONAL STUDIES:

## 2023-01-20 NOTE — CHART NOTE - NSCHARTNOTEFT_GEN_A_CORE
Source: Patient intubated, off sedation  Family [ ]     RN [x ]    Chart [x ]    Reason for Follow-Up Assessment:     82M with HTN, DM2, HLD, Alzheimer's dementia who presented to the hospital initially for COVID PNA, also found to be in CHIARA s/p RRT for worsening AMS and hypoxemic respiratory failure, currently admitted to the MICU intubated and on multi-pressor support. CT chest w/ signs of multifocal PNA and likely aspiration as well as emphysema.  Course significant for CHIARA on CKD. Per chart, due to pressor requirements, hypovolemia, and family declining, will defer HD.         Diet, NPO with Tube Feed:   Tube Feeding Modality: Orogastric  Nepro with Carb Steady (NEPRORTH)  Total Volume for 24 Hours (mL): 840  Continuous  Starting Tube Feed Rate {mL per Hour}: 10  Increase Tube Feed Rate by (mL): 10     Every 4 hours  Until Goal Tube Feed Rate (mL per Hour): 35  Tube Feed Duration (in Hours): 24  Tube Feed Start Time: 10:00  No Carb Prosource (1pkg = 15gms Protein)     Qty per Day:  2 (01-15-23 @ 15:07)      GI: Per MD note 1/20: loose stools noted; d/c'd Senna, Miralax      Enteral /Parenteral Nutrition: Pt received 100% of goal volume in past 72 hrs.   Goal volume provides 840 mL, 1512 getachew, 98 gm pro    Weight (kg):   (1/20) 88.1   (1/18) 85.6   (1/15) 78.4       Edema: 1+ gen   Pressure Injuries: R ear DTI     __________________ Pertinent Medications__________________   MEDICATIONS  (STANDING):  atorvastatin 20 milliGRAM(s) Oral at bedtime  chlorhexidine 2% Cloths 1 Application(s) Topical daily  dextrose 5%. 1000 milliLiter(s) (100 mL/Hr) IV Continuous <Continuous>  dextrose 5%. 1000 milliLiter(s) (50 mL/Hr) IV Continuous <Continuous>  dextrose 50% Injectable 25 Gram(s) IV Push once  dextrose 50% Injectable 12.5 Gram(s) IV Push once  dextrose 50% Injectable 25 Gram(s) IV Push once  glucagon  Injectable 1 milliGRAM(s) IntraMuscular once  insulin lispro (ADMELOG) corrective regimen sliding scale   SubCutaneous every 6 hours  insulin NPH human recombinant 7 Unit(s) SubCutaneous every 6 hours  meropenem  IVPB 500 milliGRAM(s) IV Intermittent every 12 hours  meropenem  IVPB      norepinephrine Infusion 0.05 MICROgram(s)/kG/Min (3.56 mL/Hr) IV Continuous <Continuous>  sodium chloride 0.9%. 1000 milliLiter(s) (75 mL/Hr) IV Continuous <Continuous>  vasopressin Infusion 0.04 Unit(s)/Min (6 mL/Hr) IV Continuous <Continuous>    MEDICATIONS  (PRN):  acetaminophen     Tablet .. 650 milliGRAM(s) Oral every 6 hours PRN Temp greater or equal to 38C (100.4F), Mild Pain (1 - 3), Moderate Pain (4 - 6)  dextrose Oral Gel 15 Gram(s) Oral once PRN Blood Glucose LESS THAN 70 milliGRAM(s)/deciliter      __________________ Pertinent Labs__________________   01-20 Na139 mmol/L Glu 162 mg/dL<H> K+ 2.9 mmol/L<LL> Cr  5.15 mg/dL<H>  mg/dL<H> 01-20 Phos 5.8 mg/dL<H> 01-20 Alb 1.8 g/dL<L> 01-14 Chol 60 mg/dL LDL --    HDL 36 mg/dL<L> Trig 102 mg/dL        POCT Blood Glucose.: 178 mg/dL (01-20-23 @ 06:12)  POCT Blood Glucose.: 159 mg/dL (01-20-23 @ 00:33)  POCT Blood Glucose.: 154 mg/dL (01-19-23 @ 18:45)  POCT Blood Glucose.: 171 mg/dL (01-19-23 @ 13:27)  POCT Blood Glucose.: 25 mg/dL (01-19-23 @ 13:19)  POCT Blood Glucose.: 181 mg/dL (01-19-23 @ 06:08)  POCT Blood Glucose.: 214 mg/dL (01-19-23 @ 00:17)  POCT Blood Glucose.: 235 mg/dL (01-18-23 @ 17:05)  POCT Blood Glucose.: 223 mg/dL (01-18-23 @ 11:30)  POCT Blood Glucose.: 80 mg/dL (01-18-23 @ 11:28)  POCT Blood Glucose.: 271 mg/dL (01-18-23 @ 04:41)  POCT Blood Glucose.: 268 mg/dL (01-17-23 @ 22:16)  POCT Blood Glucose.: 153 mg/dL (01-17-23 @ 17:46)          Estimated Needs:   20-25 getachew/kg= 5006-3736 getachew/d  1.3-1.5 gm pro/kg =  gm pro/d    [ x] no change since previous assessment      Previous Nutrition Diagnosis: Inadequate protein energy intake     Nutrition Diagnosis is [x ] ongoing       Recommendations:  1. Increase rate of Nepro to 45 mL/hr x 24 hrs + No-carb Prosource 1x daily (1080 mL, 1912 getachew, 102 gm pro)   2. Noted hypokalemia. management per nephrology recommendations.   3. Free water per MD.   4. Daily weights.           Monitoring and Evaluation:      [ x] Tolerance to diet prescription [x ] weights [x ] follow up per protocol  [ ] other: Source: Patient intubated, off sedation  Family [ ]     RN [x ]    Chart [x ]    Reason for Follow-Up Assessment:     82M with HTN, DM2, HLD, Alzheimer's dementia who presented to the hospital initially for COVID PNA, also found to be in CHIARA s/p RRT for worsening AMS and hypoxemic respiratory failure, currently admitted to the MICU intubated and on multi-pressor support. CT chest w/ signs of multifocal PNA and likely aspiration as well as emphysema.  Course significant for CHIARA on CKD. Per chart, due to pressor requirements, hypovolemia, and family declining, will defer HD. Poor prognosis per nephrology note.          Diet, NPO with Tube Feed:   Tube Feeding Modality: Orogastric  Nepro with Carb Steady (NEPRORTH)  Total Volume for 24 Hours (mL): 840  Continuous  Starting Tube Feed Rate {mL per Hour}: 10  Increase Tube Feed Rate by (mL): 10     Every 4 hours  Until Goal Tube Feed Rate (mL per Hour): 35  Tube Feed Duration (in Hours): 24  Tube Feed Start Time: 10:00  No Carb Prosource (1pkg = 15gms Protein)     Qty per Day:  2 (01-15-23 @ 15:07)      GI: Per MD note 1/20: loose stools noted; d/c'd Senna, Miralax      Enteral /Parenteral Nutrition: Pt received 100% of goal volume in past 72 hrs.   Goal volume provides 840 mL, 1512 getachew, 98 gm pro    Weight (kg):   (1/20) 88.1   (1/18) 85.6   (1/15) 78.4       Edema: 1+ gen   Pressure Injuries: R ear DTI     __________________ Pertinent Medications__________________   MEDICATIONS  (STANDING):  atorvastatin 20 milliGRAM(s) Oral at bedtime  chlorhexidine 2% Cloths 1 Application(s) Topical daily  dextrose 5%. 1000 milliLiter(s) (100 mL/Hr) IV Continuous <Continuous>  dextrose 5%. 1000 milliLiter(s) (50 mL/Hr) IV Continuous <Continuous>  dextrose 50% Injectable 25 Gram(s) IV Push once  dextrose 50% Injectable 12.5 Gram(s) IV Push once  dextrose 50% Injectable 25 Gram(s) IV Push once  glucagon  Injectable 1 milliGRAM(s) IntraMuscular once  insulin lispro (ADMELOG) corrective regimen sliding scale   SubCutaneous every 6 hours  insulin NPH human recombinant 7 Unit(s) SubCutaneous every 6 hours  meropenem  IVPB 500 milliGRAM(s) IV Intermittent every 12 hours  meropenem  IVPB      norepinephrine Infusion 0.05 MICROgram(s)/kG/Min (3.56 mL/Hr) IV Continuous <Continuous>  sodium chloride 0.9%. 1000 milliLiter(s) (75 mL/Hr) IV Continuous <Continuous>  vasopressin Infusion 0.04 Unit(s)/Min (6 mL/Hr) IV Continuous <Continuous>    MEDICATIONS  (PRN):  acetaminophen     Tablet .. 650 milliGRAM(s) Oral every 6 hours PRN Temp greater or equal to 38C (100.4F), Mild Pain (1 - 3), Moderate Pain (4 - 6)  dextrose Oral Gel 15 Gram(s) Oral once PRN Blood Glucose LESS THAN 70 milliGRAM(s)/deciliter      __________________ Pertinent Labs__________________   01-20 Na139 mmol/L Glu 162 mg/dL<H> K+ 2.9 mmol/L<LL> Cr  5.15 mg/dL<H>  mg/dL<H> 01-20 Phos 5.8 mg/dL<H> 01-20 Alb 1.8 g/dL<L> 01-14 Chol 60 mg/dL LDL --    HDL 36 mg/dL<L> Trig 102 mg/dL        POCT Blood Glucose.: 178 mg/dL (01-20-23 @ 06:12)  POCT Blood Glucose.: 159 mg/dL (01-20-23 @ 00:33)  POCT Blood Glucose.: 154 mg/dL (01-19-23 @ 18:45)  POCT Blood Glucose.: 171 mg/dL (01-19-23 @ 13:27)  POCT Blood Glucose.: 25 mg/dL (01-19-23 @ 13:19)  POCT Blood Glucose.: 181 mg/dL (01-19-23 @ 06:08)  POCT Blood Glucose.: 214 mg/dL (01-19-23 @ 00:17)  POCT Blood Glucose.: 235 mg/dL (01-18-23 @ 17:05)  POCT Blood Glucose.: 223 mg/dL (01-18-23 @ 11:30)  POCT Blood Glucose.: 80 mg/dL (01-18-23 @ 11:28)  POCT Blood Glucose.: 271 mg/dL (01-18-23 @ 04:41)  POCT Blood Glucose.: 268 mg/dL (01-17-23 @ 22:16)  POCT Blood Glucose.: 153 mg/dL (01-17-23 @ 17:46)          Estimated Needs:   20-25 getachew/kg= 2120-5130 getachew/d  1.3-1.5 gm pro/kg =  gm pro/d    [ x] no change since previous assessment      Previous Nutrition Diagnosis: Inadequate protein energy intake     Nutrition Diagnosis is [x ] ongoing       Recommendations:  1. Increase rate of Nepro to 45 mL/hr x 24 hrs + No-carb Prosource 1x daily (1080 mL, 1912 getachew, 102 gm pro)   2. Noted hypokalemia. management per nephrology recommendations.   3. Free water per MD.   4. Daily weights.           Monitoring and Evaluation:      [ x] Tolerance to diet prescription [x ] weights [x ] follow up per protocol  [ ] other:

## 2023-01-20 NOTE — PROGRESS NOTE ADULT - ASSESSMENT
====================ASSESSMENT======================  82M with HTN, DM2, HLD, Alzheimer's dementia who presented to the hospital initially for COVID PNA, also found to be in CHIARA s/p RRT for worsening AMS and hypoxemic respiratory failure, currently admitted to the MICU intubated and on multi-pressor support. CT chest w/ signs of multifocal PNA and likely aspiration as well as emphysema.    Plan:  ====================NEUROLOGY=======================  #Mental Status: AAOx1 on admission to hospital (baseline AAOx3)   - CT head negative on admission. Repeat CT head done (1/14) prior to MICU transfer with no acute changes.  - likely 2/2 metabolic encephalopathia i/s/o new COVID-19 pneumonia  - pt. currently intubated and sedated  - c/w propofol for sedation for goal RASS 0 to -1    ====================RESPIRATORY======================  #Multifocal PNA   - acute hypoxic respiratory failure/sepsis 2/2 multifocal pneumonia, likely 2/2 aspiration. Pt. currently intubated and sedated  - BCx (1/13): NGTD, MRSA negative, legionella negative  - CT chest: multifocal consolidations most predominant in the left upper and right  lower lobes which may be due to infection. Additional possibility of a prior aspiration event should also be entertained given that there is extensive endobronchial secretions and impaction in the dependent posterior portion of the right lower lobe and dependent portions of the right upper lobe  - vent settings: volume A/C, RR 26, , FiO2 50%, PEEP 10   - s/p Zosyn (1/13-1/15), vancomycin (1/13-1/15; d/c'd as MRSA negative) and azithromycin (1/14-1/16; d/c'd as Legionella negative)  - c/w meropenem (1/15- ) for now, likely 7d course    #COVID Infection  - PCR positive 1/13, home test positive 1/11  - holding Remdesivir in setting of kidney failure  - d/c'd dexamethasone 6mg qd (1/13-1/17)    #Emphysema  - seen on CT chest by MICU team, likely 2/2 hx of smoking  - c/w Symbicort BID     ====================CARDIOVASCULAR==================  #Multifactorial Shock  - vasoplegic shock i/s/o sepsis +/- mild cardiogenic component, currently on multiple pressors. Now w/ skin mottling and dusky digits noted 1/18  - cortisol level 63  - VA duplex arterial UE/LE (1/18): no evidence of arterial occlusion  - wean pressors as tolerated  - holding home BP and CHF meds as below  - monitor I/Os, daily weights, SCr, and urine output    #Hx of CHF  - diastolic disfunction grade 1-2 on POCUS  - TTE (1/15): calcified aortic valve, grossly moderate segmental LV systolic dysfunction, inferior and inferolateral wall hypokinesis. RV possibly hypokinetic  - holding home Entresto and torsemide in setting of CHIARA  - holding home carvedilol in setting of sepsis and hypotension   - monitor I/Os, daily weights  - replete electrolytes for K>4, Mg>2    #HLD (hyperlipidemia).   - c/w atorvastatin 20 mg qd    #HTN (hypertension)  - BP low on admission and patient is currently in shock and on multiple pressors  - holding home carvedilol, Entresto, amlodipine in setting of sepsis and hypotension  - continue to monitor BP    ====================/RENAL========================  #CHIARA on CKD  - baseline SCr 2.1 in 12/2022 per Nephrology. UA without evidence of infection,  - appreciate nephrology recommendations- due to pressor requirements and hypovolemia, will hold off on HD for now  - monitor I/Os, daily weights, SCr, and urine output    #Electrolytes  - Maintain K>4, Phos>3, Mag>2, iCal>1    ====================GI/NUTRITION=====================  #Diet:  - NPO w/ tube feeds  - bowel regimen: Senna, Miralax    ====================INFECTIOUS DISEASE================  #COVID Infection  - PCR positive 1/13, home test positive 1/11  - holding Remdesivir in setting of kidney failure  - d/c'd dexamethasone 6mg qd (1/13-1/17)    #Multifocal PNA   - acute hypoxic respiratory failure/sepsis 2/2 multifocal pneumonia, likely 2/2 aspiration. Pt. currently intubated and sedated  - BCx (1/13): NGTD, MRSA negative, legionella negative  - CT chest: multifocal consolidations most predominant in the left upper and right  lower lobes which may be due to infection. Additional possibility of a prior aspiration event should also be entertained given that there is extensive endobronchial secretions and impaction in the dependent posterior portion of the right lower lobe and dependent portions of the right upper lobe  - vent settings: volume A/C, RR 26, , FiO2 50%, PEEP 10  - s/p Zosyn (1/13-1/15), vancomycin (1/13-1/15; d/c'd as MRSA negative) and azithromycin (1/14-1/16; d/c'd as Legionella negative)  - c/w meropenem (1/15- ) for now, likely 7d course    ====================ENDOCRINE=======================  #DM2  - on Farxiga at home, unclear if for CHF vs DM  - A1c 6.3%  - while NPO, FSBG and moderate ISS q6h  - c/w NPH 7U q6h given persistent BG > 200s, likely 2/2 dexamethasone  - continue to monitor BGs    ====================HEMATOLOGIC/DVT PPx=============  #Thrombocytopenia  - plt 110 on admission, now downtrending, may be 2/2 acute sepsis vs. less likely HIT vs. other etiologies  - PF4 negative; JASPREET negative- unlikely to be HIT  - s/p 1U plt with good response   - f/u HIV, HCV  - holding heparin for now  - continue to monitor and transfuse for plt<10k, <20k and febrile, or <50k and actively bleeding    #DVT PPx  - holding for now given thrombocytopenia  - SCDs    ====================ETHICS===========================  Code Status: DNR  ====================ASSESSMENT======================  82M with HTN, DM2, HLD, Alzheimer's dementia who presented to the hospital initially for COVID PNA, also found to be in CHIARA s/p RRT for worsening AMS and hypoxemic respiratory failure, currently admitted to the MICU intubated and on multi-pressor support. CT chest w/ signs of multifocal PNA and likely aspiration as well as emphysema.    Plan:  ====================NEUROLOGY=======================  #Mental Status: AAOx1 on admission to hospital (baseline AAOx3)   - CT head negative on admission. Repeat CT head done (1/14) prior to MICU transfer with no acute changes.  - likely 2/2 metabolic encephalopathia i/s/o new COVID-19 pneumonia  - pt. currently intubated but off sedation    ====================RESPIRATORY======================  #Multifocal PNA   - acute hypoxic respiratory failure/sepsis 2/2 multifocal pneumonia, likely 2/2 aspiration. Pt. currently intubated and sedated  - BCx (1/13): NGTD, MRSA negative, legionella negative  - CT chest: multifocal consolidations most predominant in the left upper and right  lower lobes which may be due to infection. Additional possibility of a prior aspiration event should also be entertained given that there is extensive endobronchial secretions and impaction in the dependent posterior portion of the right lower lobe and dependent portions of the right upper lobe  - vent settings: volume A/C, RR 26, , FiO2 50%, PEEP 10   - s/p Zosyn (1/13-1/15), vancomycin (1/13-1/15; d/c'd as MRSA negative) and azithromycin (1/14-1/16; d/c'd as Legionella negative)  - c/w meropenem (1/15- ) for now, likely 7d course    #COVID Infection  - PCR positive 1/13, home test positive 1/11  - holding Remdesivir in setting of kidney failure  - d/c'd dexamethasone 6mg qd (1/13-1/17)    #Emphysema  - seen on CT chest by MICU team, likely 2/2 hx of smoking  - c/w Symbicort BID     ====================CARDIOVASCULAR==================  #Multifactorial Shock  - vasoplegic shock i/s/o sepsis +/- mild cardiogenic component, currently on multiple pressors. Now w/ skin mottling and dusky digits noted 1/18  - cortisol level 63  - VA duplex arterial UE/LE (1/18): no evidence of arterial occlusion  - wean pressors as tolerated; will cap pressors as low likelihood of meaningful recovery  - holding home BP and CHF meds as below  - monitor I/Os, daily weights, SCr, and urine output    #Hx of CHF  - diastolic disfunction grade 1-2 on POCUS  - TTE (1/15): calcified aortic valve, grossly moderate segmental LV systolic dysfunction, inferior and inferolateral wall hypokinesis. RV possibly hypokinetic  - holding home Entresto and torsemide in setting of CHIARA  - holding home carvedilol in setting of sepsis and hypotension   - monitor I/Os, daily weights  - replete electrolytes for K>4, Mg>2    #HLD (hyperlipidemia).   - c/w atorvastatin 20 mg qd    #HTN (hypertension)  - BP low on admission and patient is currently in shock and on multiple pressors  - holding home carvedilol, Entresto, amlodipine in setting of sepsis and hypotension  - continue to monitor BP    ====================/RENAL========================  #CHIARA on CKD  - baseline SCr 2.1 in 12/2022 per Nephrology. UA without evidence of infection,  - appreciate nephrology recommendations- due to pressor requirements, hypovolemia, and family declining, will defer HD  - monitor I/Os, daily weights, and urine output    #Electrolytes  - Maintain K>4, Phos>3, Mag>2, iCal>1    ====================GI/NUTRITION=====================  #Diet:  - NPO w/ tube feeds  - loose stools noted; d/c'd Senna, Miralax    ====================INFECTIOUS DISEASE================  #COVID Infection  - PCR positive 1/13, home test positive 1/11  - holding Remdesivir in setting of kidney failure  - d/c'd dexamethasone 6mg qd (1/13-1/17)    #Multifocal PNA   - acute hypoxic respiratory failure/sepsis 2/2 multifocal pneumonia, likely 2/2 aspiration. Pt. currently intubated and sedated  - BCx (1/13): NGTD, MRSA negative, legionella negative  - CT chest: multifocal consolidations most predominant in the left upper and right  lower lobes which may be due to infection. Additional possibility of a prior aspiration event should also be entertained given that there is extensive endobronchial secretions and impaction in the dependent posterior portion of the right lower lobe and dependent portions of the right upper lobe  - vent settings: volume A/C, RR 26, , FiO2 50%, PEEP 10  - s/p Zosyn (1/13-1/15), vancomycin (1/13-1/15; d/c'd as MRSA negative) and azithromycin (1/14-1/16; d/c'd as Legionella negative)  - c/w meropenem (1/15- ) for now, likely 7d course    ====================ENDOCRINE=======================  #DM2  - on Farxiga at home, unclear if for CHF vs DM  - A1c 6.3%  - while NPO, FSBG and moderate ISS q6h  - c/w NPH 7U q6h  - continue to monitor BGs    ====================HEMATOLOGIC/DVT PPx=============  #Thrombocytopenia  - plt 110 on admission, now downtrending, may be 2/2 acute sepsis vs. less likely HIT vs. other etiologies  - PF4 negative; JASPREET negative- unlikely to be HIT  - s/p 1U plt with good response   - holding heparin for now  - will stop lab draws as pt unlikely to make meaningful recovery given extensive skin mottling    #DVT PPx  - holding for now given thrombocytopenia  - SCDs    ====================ETHICS===========================  Code Status: DNR, pending additional discussions with family

## 2023-01-20 NOTE — PROGRESS NOTE ADULT - SUBJECTIVE AND OBJECTIVE BOX
*******************************  Ger Toney MD (PGY-1)  Internal Medicine  Contact via Microsoft TEAMS  [facility] Pager: [phone]  [facility] Pager: [phone]  *******************************    INTERVAL HPI/OVERNIGHT EVENTS: No acute events overnight.    SUBJECTIVE: Patient seen and examined at bedside.     MEDICATIONS  (STANDING):  atorvastatin 20 milliGRAM(s) Oral at bedtime  budesonide  80 MICROgram(s)/formoterol 4.5 MICROgram(s) Inhaler 2 Puff(s) Inhalation two times a day  chlorhexidine 2% Cloths 1 Application(s) Topical daily  dextrose 5%. 1000 milliLiter(s) (100 mL/Hr) IV Continuous <Continuous>  dextrose 5%. 1000 milliLiter(s) (50 mL/Hr) IV Continuous <Continuous>  dextrose 50% Injectable 25 Gram(s) IV Push once  dextrose 50% Injectable 12.5 Gram(s) IV Push once  dextrose 50% Injectable 25 Gram(s) IV Push once  glucagon  Injectable 1 milliGRAM(s) IntraMuscular once  insulin lispro (ADMELOG) corrective regimen sliding scale   SubCutaneous every 6 hours  insulin NPH human recombinant 7 Unit(s) SubCutaneous every 6 hours  meropenem  IVPB 500 milliGRAM(s) IV Intermittent every 12 hours  meropenem  IVPB      norepinephrine Infusion 0.05 MICROgram(s)/kG/Min (3.56 mL/Hr) IV Continuous <Continuous>  polyethylene glycol 3350 17 Gram(s) Oral two times a day  senna 2 Tablet(s) Oral at bedtime  vasopressin Infusion 0.04 Unit(s)/Min (6 mL/Hr) IV Continuous <Continuous>    MEDICATIONS  (PRN):  acetaminophen     Tablet .. 650 milliGRAM(s) Oral every 6 hours PRN Temp greater or equal to 38C (100.4F), Mild Pain (1 - 3), Moderate Pain (4 - 6)  dextrose Oral Gel 15 Gram(s) Oral once PRN Blood Glucose LESS THAN 70 milliGRAM(s)/deciliter    ALLERGIES:  Allergies  No Known Allergies  Intolerances    VITAL SIGNS:  ICU Vital Signs Last 24 Hrs  T(C): 36.9 (20 Jan 2023 04:00), Max: 37.1 (19 Jan 2023 08:00)  T(F): 98.4 (20 Jan 2023 04:00), Max: 98.8 (19 Jan 2023 08:00)  HR: 73 (20 Jan 2023 07:00) (65 - 77)  BP: 112/60 (19 Jan 2023 16:05) (112/51 - 125/56)  BP(mean): 75 (19 Jan 2023 16:05) (67 - 76)  ABP: 107/44 (20 Jan 2023 07:00) (94/38 - 126/52)  ABP(mean): 66 (20 Jan 2023 07:00) (56 - 80)  RR: 28 (20 Jan 2023 07:00) (15 - 28)  SpO2: 98% (20 Jan 2023 07:00) (92% - 98%)    O2 Parameters below as of 20 Jan 2023 07:00  Patient On (Oxygen Delivery Method): ventilator    O2 Concentration (%): 50    Mode: AC/ CMV (Assist Control/ Continuous Mandatory Ventilation), RR (machine): 26, TV (machine): 500, FiO2: 50, PEEP: 10, ITime: 0.8, MAP: 18, PIP: 32  Plateau pressure:   P/F ratio:     01-19 @ 07:01  -  01-20 @ 07:00  --------------------------------------------------------  IN: 1203.4 mL / OUT: 710 mL / NET: 493.4 mL    CAPILLARY BLOOD GLUCOSE    POCT Blood Glucose.: 178 mg/dL (20 Jan 2023 06:12)    ECG:    PHYSICAL EXAM:  GENERAL: intubated and sedated  HEENT: EOMI. PERRL  NECK: Supple, no JVD  HEART: S1, S2, regular rate and rhythm, no murmurs, rubs, or gallops  LUNGS: +b/l rales, rhonchi  ABDOMEN: Soft, nontender, nondistended, +BS  EXTREMITIES: nonpalpable peripheral pulses. 1+ pitting edema b/l LE and UE  NERVOUS SYSTEM: intubated and sedated  SKIN: mottling noted on lower extremities, dusky toes and fingers    LABS:                        7.4    14.13 )-----------( 22       ( 20 Jan 2023 00:30 )             22.9     01-20    139  |  100  |  110<H>  ----------------------------<  162<H>  2.9<LL>   |  21<L>  |  5.15<H>    Ca    6.9<L>      20 Jan 2023 00:30  Phos  5.8     01-20  Mg     2.20     01-20    TPro  5.4<L>  /  Alb  1.8<L>  /  TBili  0.9  /  DBili  x   /  AST  213<H>  /  ALT  70<H>  /  AlkPhos  108  01-20    PT/INR - ( 20 Jan 2023 00:30 )   PT: 10.2 sec;   INR: <0.90 ratio    PTT - ( 20 Jan 2023 00:30 )  PTT:32.4 sec    RADIOLOGY & ADDITIONAL TESTS:  *******************************  Ger Toney MD (PGY-1)  Internal Medicine  Contact via [employer] TEAMS  [facility] Pager: [phone]  [facility] Pager: [phone]  *******************************    INTERVAL HPI/OVERNIGHT EVENTS: No acute events overnight.    SUBJECTIVE: Patient seen and examined at bedside.     MEDICATIONS  (STANDING):  atorvastatin 20 milliGRAM(s) Oral at bedtime  chlorhexidine 2% Cloths 1 Application(s) Topical daily  dextrose 5%. 1000 milliLiter(s) (100 mL/Hr) IV Continuous <Continuous>  dextrose 5%. 1000 milliLiter(s) (50 mL/Hr) IV Continuous <Continuous>  dextrose 50% Injectable 25 Gram(s) IV Push once  dextrose 50% Injectable 12.5 Gram(s) IV Push once  dextrose 50% Injectable 25 Gram(s) IV Push once  glucagon  Injectable 1 milliGRAM(s) IntraMuscular once  insulin lispro (ADMELOG) corrective regimen sliding scale   SubCutaneous every 6 hours  insulin NPH human recombinant 7 Unit(s) SubCutaneous every 6 hours  meropenem  IVPB 500 milliGRAM(s) IV Intermittent every 12 hours  meropenem  IVPB      norepinephrine Infusion 0.05 MICROgram(s)/kG/Min (3.56 mL/Hr) IV Continuous <Continuous>  sodium chloride 0.9%. 1000 milliLiter(s) (75 mL/Hr) IV Continuous <Continuous>  vasopressin Infusion 0.04 Unit(s)/Min (6 mL/Hr) IV Continuous <Continuous>    MEDICATIONS  (PRN):  acetaminophen     Tablet .. 650 milliGRAM(s) Oral every 6 hours PRN Temp greater or equal to 38C (100.4F), Mild Pain (1 - 3), Moderate Pain (4 - 6)  dextrose Oral Gel 15 Gram(s) Oral once PRN Blood Glucose LESS THAN 70 milliGRAM(s)/deciliter    ALLERGIES:  Allergies  No Known Allergies  Intolerances    VITAL SIGNS:  ICU Vital Signs Last 24 Hrs  T(C): 36.9 (20 Jan 2023 04:00), Max: 37.1 (19 Jan 2023 08:00)  T(F): 98.4 (20 Jan 2023 04:00), Max: 98.8 (19 Jan 2023 08:00)  HR: 73 (20 Jan 2023 07:00) (65 - 77)  BP: 112/60 (19 Jan 2023 16:05) (112/51 - 125/56)  BP(mean): 75 (19 Jan 2023 16:05) (67 - 76)  ABP: 107/44 (20 Jan 2023 07:00) (94/38 - 126/52)  ABP(mean): 66 (20 Jan 2023 07:00) (56 - 80)  RR: 28 (20 Jan 2023 07:00) (15 - 28)  SpO2: 98% (20 Jan 2023 07:00) (92% - 98%)    O2 Parameters below as of 20 Jan 2023 07:00  Patient On (Oxygen Delivery Method): ventilator    O2 Concentration (%): 50    Mode: AC/ CMV (Assist Control/ Continuous Mandatory Ventilation), RR (machine): 26, TV (machine): 500, FiO2: 50, PEEP: 10, ITime: 0.8, MAP: 18, PIP: 32  Plateau pressure:   P/F ratio:     01-19 @ 07:01  -  01-20 @ 07:00  --------------------------------------------------------  IN: 1203.4 mL / OUT: 710 mL / NET: 493.4 mL    CAPILLARY BLOOD GLUCOSE    POCT Blood Glucose.: 178 mg/dL (20 Jan 2023 06:12)    ECG:    PHYSICAL EXAM:  GENERAL: intubated but off sedation  HEENT: EOMI. PERRL  NECK: Supple, no JVD  HEART: S1, S2, regular rate and rhythm, no murmurs, rubs, or gallops  LUNGS: +b/l rales, rhonchi  ABDOMEN: Soft, nontender, nondistended, +BS  EXTREMITIES: nonpalpable peripheral pulses. 1+ pitting edema b/l LE and UE  NERVOUS SYSTEM: intubated, not waking up or following commands  SKIN: mottling noted on lower extremities, dusky toes and fingers    LABS:                        7.4    14.13 )-----------( 22       ( 20 Jan 2023 00:30 )             22.9     01-20    139  |  100  |  110<H>  ----------------------------<  162<H>  2.9<LL>   |  21<L>  |  5.15<H>    Ca    6.9<L>      20 Jan 2023 00:30  Phos  5.8     01-20  Mg     2.20     01-20    TPro  5.4<L>  /  Alb  1.8<L>  /  TBili  0.9  /  DBili  x   /  AST  213<H>  /  ALT  70<H>  /  AlkPhos  108  01-20    PT/INR - ( 20 Jan 2023 00:30 )   PT: 10.2 sec;   INR: <0.90 ratio    PTT - ( 20 Jan 2023 00:30 )  PTT:32.4 sec    RADIOLOGY & ADDITIONAL TESTS:

## 2023-01-21 NOTE — PROGRESS NOTE ADULT - ASSESSMENT
82M DM, CAD, CHF, Dementia, CKD  bibems accompanied by daughter after a fall this evening. Nephrology consulted for acute on ckd    CHIARA on CKD stage 4  baseline ~ 2.1 12/2022  CHIARA possible ATN vs prerenal  renal function continue to worsne  diuretic ACE on hold  Feurea <35% suggestive of prerenal  s/p bumex 1/16  urine output decreased   started gentle hydration with NS @ 75cc/hr x1L  poor prognosis. pending GOC conversation   monitor  abd us without hydro  monitor bmp and urine output  avoid nephrotoxic agents  As per discussion regarding HD with family and ICU providers.  Patient is on multiple vasopressure-Dr. Berg and KEVIN Diego spoke with wife and daughter and they don't want him to go through dialysis    proteinuria  mild  urine p/c ratio 0.2  not significant     acidosis  lactic acidosis   improving  monitor    hypokalemia  supplement per team  monitor    covid/pna  care per team

## 2023-01-21 NOTE — PROGRESS NOTE ADULT - SUBJECTIVE AND OBJECTIVE BOX
[facility] [place] [facility]   MD CATRINA ZUÑIGA MD KRISTINE SOLTANPOUR, DO ANGELA WONG, PA    TEL:  OFFICE: [phone]  From 5pm-7am Answering Service [phone]    -- RENAL FOLLOW UP NOTE ---Date of Service 01-21-23 @ 23:49    Patient is a 82y old  Male who presents with a chief complaint of COVID (21 Jan 2023 06:51)      Patient seen and examined at bedside. No chest pain/sob    VITALS:  T(F): 97 (01-21-23 @ 20:00), Max: 98 (01-21-23 @ 08:00)  HR: 72 (01-21-23 @ 22:27)  BP: 111/56 (01-21-23 @ 12:00)  RR: 26 (01-21-23 @ 21:00)  SpO2: 94% (01-21-23 @ 22:27)  Wt(kg): --    01-20 @ 07:01  -  01-21 @ 07:00  --------------------------------------------------------  IN: 2071.8 mL / OUT: 625 mL / NET: 1446.8 mL    01-21 @ 07:01  -  01-21 @ 23:49  --------------------------------------------------------  IN: 825 mL / OUT: 340 mL / NET: 485 mL          PHYSICAL EXAM:  General: NAD  Neck: No JVD  Respiratory: CTAB, no wheezes, rales or rhonchi  Cardiovascular: S1, S2, RRR  Gastrointestinal: BS+, soft, NT/ND  Extremities: No peripheral edema    Hospital Medications:   MEDICATIONS  (STANDING):  atorvastatin 20 milliGRAM(s) Oral at bedtime  chlorhexidine 2% Cloths 1 Application(s) Topical daily  dextrose 5%. 1000 milliLiter(s) (100 mL/Hr) IV Continuous <Continuous>  dextrose 5%. 1000 milliLiter(s) (50 mL/Hr) IV Continuous <Continuous>  dextrose 50% Injectable 25 Gram(s) IV Push once  dextrose 50% Injectable 12.5 Gram(s) IV Push once  dextrose 50% Injectable 25 Gram(s) IV Push once  glucagon  Injectable 1 milliGRAM(s) IntraMuscular once  insulin lispro (ADMELOG) corrective regimen sliding scale   SubCutaneous every 6 hours  insulin NPH human recombinant 7 Unit(s) SubCutaneous every 6 hours  meropenem  IVPB 500 milliGRAM(s) IV Intermittent every 12 hours  meropenem  IVPB      norepinephrine Infusion 0.05 MICROgram(s)/kG/Min (3.56 mL/Hr) IV Continuous <Continuous>  vasopressin Infusion 0.04 Unit(s)/Min (6 mL/Hr) IV Continuous <Continuous>      LABS:  01-20    139  |  100  |  110<H>  ----------------------------<  162<H>  2.9<LL>   |  21<L>  |  5.15<H>    Ca    6.9<L>      20 Jan 2023 00:30  Phos  5.8     01-20  Mg     2.20     01-20    TPro  5.4<L>  /  Alb  1.8<L>  /  TBili  0.9  /  DBili      /  AST  213<H>  /  ALT  70<H>  /  AlkPhos  108  01-20    Creatinine Trend: 5.15 <--, 4.37 <--, 3.68 <--, 3.38 <--, 3.44 <--, 3.84 <--, 4.23 <--, 4.34 <--, 4.59 <--, 4.33 <--                                7.4    14.13 )-----------( 22       ( 20 Jan 2023 00:30 )             22.9     Urine Studies:  Urinalysis - [01-13-23 @ 05:34]      Color Yellow / Appearance Slightly Turbid / SG 1.015 / pH 5.5      Gluc Negative / Ketone Negative  / Bili Negative / Urobili <2 mg/dL       Blood Negative / Protein 30 mg/dL / Leuk Est Negative / Nitrite Negative      RBC 2 / WBC 1 / Hyaline 6 / Gran  / Sq Epi  / Non Sq Epi 2 / Bacteria Negative      Ferritin 612      [01-14-23 @ 04:45]  HbA1c 7.0      [05-26-18 @ 16:34]  TSH 0.59      [01-14-23 @ 14:00]  Lipid: chol 60, , HDL 36, LDL --      [01-14-23 @ 04:45]    HCV 0.11, Nonreact      [01-20-23 @ 00:30]      RADIOLOGY & ADDITIONAL STUDIES:

## 2023-01-21 NOTE — PROGRESS NOTE ADULT - SUBJECTIVE AND OBJECTIVE BOX
*******************************  Ger Toney MD (PGY-1)  Internal Medicine  Contact via [employer] TEAMS  [facility] Pager: [phone]  [facility] Pager: [phone]  *******************************    INTERVAL HPI/OVERNIGHT EVENTS: Per GoC discussions with family, decision was made to cap pressors and limit blood draws. Currently awaiting final decisions regarding comfort care    SUBJECTIVE: Patient seen and examined at bedside. ROS limited as pt. intubated and sedated    MEDICATIONS  (STANDING):  atorvastatin 20 milliGRAM(s) Oral at bedtime  chlorhexidine 2% Cloths 1 Application(s) Topical daily  dextrose 5%. 1000 milliLiter(s) (100 mL/Hr) IV Continuous <Continuous>  dextrose 5%. 1000 milliLiter(s) (50 mL/Hr) IV Continuous <Continuous>  dextrose 50% Injectable 25 Gram(s) IV Push once  dextrose 50% Injectable 12.5 Gram(s) IV Push once  dextrose 50% Injectable 25 Gram(s) IV Push once  glucagon  Injectable 1 milliGRAM(s) IntraMuscular once  insulin lispro (ADMELOG) corrective regimen sliding scale   SubCutaneous every 6 hours  insulin NPH human recombinant 7 Unit(s) SubCutaneous every 6 hours  meropenem  IVPB 500 milliGRAM(s) IV Intermittent every 12 hours  meropenem  IVPB      norepinephrine Infusion 0.05 MICROgram(s)/kG/Min (3.56 mL/Hr) IV Continuous <Continuous>  vasopressin Infusion 0.04 Unit(s)/Min (6 mL/Hr) IV Continuous <Continuous>    MEDICATIONS  (PRN):  acetaminophen     Tablet .. 650 milliGRAM(s) Oral every 6 hours PRN Temp greater or equal to 38C (100.4F), Mild Pain (1 - 3), Moderate Pain (4 - 6)  dextrose Oral Gel 15 Gram(s) Oral once PRN Blood Glucose LESS THAN 70 milliGRAM(s)/deciliter    ALLERGIES:  Allergies    No Known Allergies    Intolerances    VITAL SIGNS:  ICU Vital Signs Last 24 Hrs  T(C): 36.4 (21 Jan 2023 04:00), Max: 36.7 (20 Jan 2023 08:00)  T(F): 97.5 (21 Jan 2023 04:00), Max: 98.1 (20 Jan 2023 12:00)  HR: 71 (21 Jan 2023 06:00) (68 - 84)  BP: 120/66 (20 Jan 2023 16:00) (120/66 - 141/73)  BP(mean): 83 (20 Jan 2023 16:00) (83 - 94)  ABP: 112/53 (21 Jan 2023 06:00) (94/40 - 126/54)  ABP(mean): 75 (21 Jan 2023 06:00) (57 - 81)  RR: 26 (21 Jan 2023 06:00) (19 - 29)  SpO2: 91% (21 Jan 2023 06:00) (91% - 99%)    O2 Parameters below as of 21 Jan 2023 06:00  Patient On (Oxygen Delivery Method): ventilator    O2 Concentration (%): 50    Mode: AC/ CMV (Assist Control/ Continuous Mandatory Ventilation), RR (machine): 26, TV (machine): 500, FiO2: 50, PEEP: 10, ITime: 0.8, MAP: 17, PIP: 34  Plateau pressure:   P/F ratio:     01-19 @ 07:01 - 01-20 @ 07:00  --------------------------------------------------------  IN: 1203.4 mL / OUT: 710 mL / NET: 493.4 mL    01-20 @ 07:01 - 01-21 @ 06:52  --------------------------------------------------------  IN: 2071.8 mL / OUT: 625 mL / NET: 1446.8 mL    CAPILLARY BLOOD GLUCOSE    POCT Blood Glucose.: 130 mg/dL (21 Jan 2023 06:03)    ECG:    PHYSICAL EXAM:  GENERAL: intubated but off sedation  HEENT: EOMI. PERRL  NECK: Supple, no JVD  HEART: S1, S2, regular rate and rhythm, no murmurs, rubs, or gallops  LUNGS: +b/l rales, rhonchi  ABDOMEN: Soft, nontender, nondistended, +BS  EXTREMITIES: nonpalpable peripheral pulses. 1+ pitting edema b/l LE and UE  NERVOUS SYSTEM: intubated, not waking up or following commands  SKIN: mottling noted on lower extremities, dusky toes and fingers    LABS:                        7.4    14.13 )-----------( 22       ( 20 Jan 2023 00:30 )             22.9     01-20    139  |  100  |  110<H>  ----------------------------<  162<H>  2.9<LL>   |  21<L>  |  5.15<H>    Ca    6.9<L>      20 Jan 2023 00:30  Phos  5.8     01-20  Mg     2.20     01-20    TPro  5.4<L>  /  Alb  1.8<L>  /  TBili  0.9  /  DBili  x   /  AST  213<H>  /  ALT  70<H>  /  AlkPhos  108  01-20    PT/INR - ( 20 Jan 2023 00:30 )   PT: 10.2 sec;   INR: <0.90 ratio    PTT - ( 20 Jan 2023 00:30 )  PTT:32.4 sec    RADIOLOGY & ADDITIONAL TESTS:

## 2023-01-21 NOTE — PROGRESS NOTE ADULT - ASSESSMENT
====================ASSESSMENT======================  82M with HTN, DM2, HLD, Alzheimer's dementia who presented to the hospital initially for COVID PNA, also found to be in CHIARA s/p RRT for worsening AMS and hypoxemic respiratory failure, currently admitted to the MICU intubated and on multi-pressor support. CT chest w/ signs of multifocal PNA and likely aspiration as well as emphysema.    Plan:  ====================NEUROLOGY=======================  #Mental Status: AAOx1 on admission to hospital (baseline AAOx3)   - CT head negative on admission. Repeat CT head done (1/14) prior to MICU transfer with no acute changes.  - likely 2/2 metabolic encephalopathia i/s/o new COVID-19 pneumonia  - pt. currently intubated but off sedation    ====================RESPIRATORY======================  #Multifocal PNA   - acute hypoxic respiratory failure/sepsis 2/2 multifocal pneumonia, likely 2/2 aspiration. Pt. currently intubated and sedated  - BCx (1/13): NGTD, MRSA negative, legionella negative  - CT chest: multifocal consolidations most predominant in the left upper and right  lower lobes which may be due to infection. Additional possibility of a prior aspiration event should also be entertained given that there is extensive endobronchial secretions and impaction in the dependent posterior portion of the right lower lobe and dependent portions of the right upper lobe  - vent settings: volume A/C, RR 26, , FiO2 50%, PEEP 10   - s/p Zosyn (1/13-1/15), vancomycin (1/13-1/15; d/c'd as MRSA negative) and azithromycin (1/14-1/16; d/c'd as Legionella negative)  - c/w meropenem (1/15- ) for now, likely 7d course    #COVID Infection  - PCR positive 1/13, home test positive 1/11  - holding Remdesivir in setting of kidney failure  - d/c'd dexamethasone 6mg qd (1/13-1/17)    #Emphysema  - seen on CT chest by MICU team, likely 2/2 hx of smoking  - c/w Symbicort BID     ====================CARDIOVASCULAR==================  #Multifactorial Shock  - vasoplegic shock i/s/o sepsis +/- mild cardiogenic component, currently on multiple pressors. Now w/ skin mottling and dusky digits noted 1/18, worsening  - cortisol level 63  - VA duplex arterial UE/LE (1/18): no evidence of arterial occlusion  - wean pressors as tolerated; will cap pressors as low likelihood of meaningful recovery  - holding home BP and CHF meds as below  - monitor I/Os, daily weights, SCr, and urine output    #Hx of CHF  - diastolic disfunction grade 1-2 on POCUS  - TTE (1/15): calcified aortic valve, grossly moderate segmental LV systolic dysfunction, inferior and inferolateral wall hypokinesis. RV possibly hypokinetic  - holding home Entresto and torsemide in setting of CHIARA  - holding home carvedilol in setting of sepsis and hypotension   - monitor I/Os, daily weights  - replete electrolytes for K>4, Mg>2    #HLD (hyperlipidemia).   - c/w atorvastatin 20 mg qd    #HTN (hypertension)  - BP low on admission and patient is currently in shock and on multiple pressors  - holding home carvedilol, Entresto, amlodipine in setting of sepsis and hypotension  - continue to monitor BP    ====================/RENAL========================  #CHIARA on CKD  - baseline SCr 2.1 in 12/2022 per Nephrology. UA without evidence of infection,  - appreciate nephrology recommendations- due to pressor requirements, hypovolemia, and family declining, will defer HD  - monitor I/Os, daily weights, and urine output    #Electrolytes  - Maintain K>4, Phos>3, Mag>2, iCal>1    ====================GI/NUTRITION=====================  #Diet:  - NPO w/ tube feeds  - loose stools noted; d/c'd Senna, Miralax    ====================INFECTIOUS DISEASE================  #COVID Infection  - PCR positive 1/13, home test positive 1/11  - holding Remdesivir in setting of kidney failure  - d/c'd dexamethasone 6mg qd (1/13-1/17)    #Multifocal PNA   - acute hypoxic respiratory failure/sepsis 2/2 multifocal pneumonia, likely 2/2 aspiration. Pt. currently intubated and sedated  - BCx (1/13): NGTD, MRSA negative, legionella negative  - CT chest: multifocal consolidations most predominant in the left upper and right  lower lobes which may be due to infection. Additional possibility of a prior aspiration event should also be entertained given that there is extensive endobronchial secretions and impaction in the dependent posterior portion of the right lower lobe and dependent portions of the right upper lobe  - vent settings: volume A/C, RR 26, , FiO2 50%, PEEP 10  - s/p Zosyn (1/13-1/15), vancomycin (1/13-1/15; d/c'd as MRSA negative) and azithromycin (1/14-1/16; d/c'd as Legionella negative)  - c/w meropenem (1/15- ) for now, likely 7d course    ====================ENDOCRINE=======================  #DM2  - on Farxiga at home, unclear if for CHF vs DM  - A1c 6.3%  - while NPO, FSBG and moderate ISS q6h  - c/w NPH 7U q6h  - continue to monitor BGs    ====================HEMATOLOGIC/DVT PPx=============  #Thrombocytopenia  - plt 110 on admission, now downtrending, may be 2/2 acute sepsis vs. less likely HIT vs. other etiologies  - PF4 negative; JASPREET negative- unlikely to be HIT  - s/p 1U plt with good response   - holding heparin for now  - will stop lab draws as pt unlikely to make meaningful recovery given extensive skin mottling    #DVT PPx  - holding for now given thrombocytopenia  - SCDs    ====================ETHICS===========================  Code Status: DNR, pending additional discussions with family

## 2023-01-22 NOTE — PROGRESS NOTE ADULT - SUBJECTIVE AND OBJECTIVE BOX
[facility] [place] [facility]   MD CATRINA ZUÑIGA MD KRISTINE SOLTANPOUR, DO ANGELA WONG, PA    TEL:  OFFICE: [phone]  From 5pm-7am Answering Service [phone]    -- RENAL FOLLOW UP NOTE ---Date of Service 01-23-23 @ 00:03    Patient is a 82y old  Male who presents with a chief complaint of COVID (22 Jan 2023 07:24)      Patient seen and examined at bedside. Intubated but not sedated  VITALS:  T(F): 98.2 (01-22-23 @ 20:00), Max: 99 (01-22-23 @ 08:00)  HR: 83 (01-22-23 @ 23:35)  BP: --  RR: 26 (01-22-23 @ 22:00)  SpO2: 95% (01-22-23 @ 23:35)  Wt(kg): --    01-21 @ 07:01  -  01-22 @ 07:00  --------------------------------------------------------  IN: 1288 mL / OUT: 380 mL / NET: 908 mL    01-22 @ 07:01  -  01-23 @ 00:03  --------------------------------------------------------  IN: 825 mL / OUT: 340 mL / NET: 485 mL          PHYSICAL EXAM:  General: NAD  Neck: No JVD  Respiratory: CTAB, no wheezes, rales or rhonchi  Cardiovascular: S1, S2, RRR  Gastrointestinal: BS+, soft, NT/ND  Extremities: No peripheral edema    Hospital Medications:   MEDICATIONS  (STANDING):  chlorhexidine 2% Cloths 1 Application(s) Topical daily  dextrose 5%. 1000 milliLiter(s) (100 mL/Hr) IV Continuous <Continuous>  dextrose 5%. 1000 milliLiter(s) (50 mL/Hr) IV Continuous <Continuous>  dextrose 50% Injectable 25 Gram(s) IV Push once  dextrose 50% Injectable 12.5 Gram(s) IV Push once  dextrose 50% Injectable 25 Gram(s) IV Push once  glucagon  Injectable 1 milliGRAM(s) IntraMuscular once  norepinephrine Infusion 0.05 MICROgram(s)/kG/Min (3.56 mL/Hr) IV Continuous <Continuous>  vasopressin Infusion 0.04 Unit(s)/Min (6 mL/Hr) IV Continuous <Continuous>      LABS:        Creatinine Trend: 5.15 <--, 4.37 <--, 3.68 <--, 3.38 <--, 3.44 <--, 3.84 <--            Urine Studies:  Urinalysis - [01-13-23 @ 05:34]      Color Yellow / Appearance Slightly Turbid / SG 1.015 / pH 5.5      Gluc Negative / Ketone Negative  / Bili Negative / Urobili <2 mg/dL       Blood Negative / Protein 30 mg/dL / Leuk Est Negative / Nitrite Negative      RBC 2 / WBC 1 / Hyaline 6 / Gran  / Sq Epi  / Non Sq Epi 2 / Bacteria Negative      Ferritin 612      [01-14-23 @ 04:45]  HbA1c 7.0      [05-26-18 @ 16:34]  TSH 0.59      [01-14-23 @ 14:00]  Lipid: chol 60, , HDL 36, LDL --      [01-14-23 @ 04:45]    HCV 0.11, Nonreact      [01-20-23 @ 00:30]      RADIOLOGY & ADDITIONAL STUDIES:

## 2023-01-22 NOTE — PROGRESS NOTE ADULT - SUBJECTIVE AND OBJECTIVE BOX
***************************************************************  Nino (Ji-Cheng) [facility] PGY1  Internal Medicine   ***************************************************************    MARY NEWSOME  82y  MRN: [id number]    Patient is a 82y old  Male who presents with a chief complaint of COVID (21 Jan 2023 17:48)      Subjective: no events ON. Denies fever, CP, SOB, abn pain, N/V, dysuria. Tolerating diet.      MEDICATIONS  (STANDING):  atorvastatin 20 milliGRAM(s) Oral at bedtime  chlorhexidine 2% Cloths 1 Application(s) Topical daily  dextrose 5%. 1000 milliLiter(s) (100 mL/Hr) IV Continuous <Continuous>  dextrose 5%. 1000 milliLiter(s) (50 mL/Hr) IV Continuous <Continuous>  dextrose 50% Injectable 25 Gram(s) IV Push once  dextrose 50% Injectable 12.5 Gram(s) IV Push once  dextrose 50% Injectable 25 Gram(s) IV Push once  glucagon  Injectable 1 milliGRAM(s) IntraMuscular once  insulin NPH human recombinant 7 Unit(s) SubCutaneous every 6 hours  meropenem  IVPB      meropenem  IVPB 500 milliGRAM(s) IV Intermittent every 12 hours  norepinephrine Infusion 0.05 MICROgram(s)/kG/Min (3.56 mL/Hr) IV Continuous <Continuous>  vasopressin Infusion 0.04 Unit(s)/Min (6 mL/Hr) IV Continuous <Continuous>    MEDICATIONS  (PRN):  acetaminophen     Tablet .. 650 milliGRAM(s) Oral every 6 hours PRN Temp greater or equal to 38C (100.4F), Mild Pain (1 - 3), Moderate Pain (4 - 6)  dextrose Oral Gel 15 Gram(s) Oral once PRN Blood Glucose LESS THAN 70 milliGRAM(s)/deciliter      Objective:    Vitals: Vital Signs Last 24 Hrs  T(C): 36.7 (01-22-23 @ 04:00), Max: 36.7 (01-21-23 @ 08:00)  T(F): 98.1 (01-22-23 @ 04:00), Max: 98.1 (01-22-23 @ 04:00)  HR: 77 (01-22-23 @ 07:00) (68 - 83)  BP: 111/56 (01-21-23 @ 12:00) (111/56 - 117/61)  BP(mean): 69 (01-21-23 @ 12:00) (69 - 74)  RR: 27 (01-22-23 @ 07:00) (17 - 29)  SpO2: 93% (01-22-23 @ 07:00) (91% - 100%)            I&O's Summary    21 Jan 2023 07:01  -  22 Jan 2023 07:00  --------------------------------------------------------  IN: 1233 mL / OUT: 380 mL / NET: 853 mL        PHYSICAL EXAM:  GENERAL: NAD  HEAD:  Atraumatic, Normocephalic  EYES: EOMI, conjunctiva and sclera clear  CHEST/LUNG: Clear to auscultation bilaterally; No rales, rhonchi, wheezing, or rubs  HEART: Regular rate and rhythm; No murmurs, rubs, or gallops  ABDOMEN: Soft, Nontender, Nondistended;   SKIN: No rashes or lesions  NERVOUS SYSTEM:  Alert & Oriented X3, no focal deficits    LABS:  01-20    139  |  100  |  110<H>  ----------------------------<  162<H>  2.9<LL>   |  21<L>  |  5.15<H>    Ca    6.9<L>      20 Jan 2023 00:30    TPro  5.4<L>  /  Alb  1.8<L>  /  TBili  0.9  /  DBili  x   /  AST  213<H>  /  ALT  70<H>  /  AlkPhos  108  01-20                                              7.4    14.13 )-----------( 22       ( 20 Jan 2023 00:30 )             22.9     CAPILLARY BLOOD GLUCOSE      POCT Blood Glucose.: 194 mg/dL (21 Jan 2023 23:16)  POCT Blood Glucose.: <25 mg/dL (21 Jan 2023 23:10)  POCT Blood Glucose.: 140 mg/dL (21 Jan 2023 18:22)  POCT Blood Glucose.: 133 mg/dL (21 Jan 2023 11:43)      RADIOLOGY & ADDITIONAL TESTS:    Imaging Personally Reviewed:  [x ] YES  [ ] NO    Consultants involved in case:   Consultant(s) Notes Reviewed:  [ x] YES  [ ] NO:   Care Discussed with Consultants/Other Providers [x ] YES  [ ] NO         ***************************************************************  Nino (Ji-Cheng) [facility] PGY1  Internal Medicine   ***************************************************************    MARY NEWSOME  82y  MRN: [id number]    Patient is a 82y old  Male who presents with a chief complaint of COVID (21 Jan 2023 17:48)    Subjective: NAEO.     MEDICATIONS  (STANDING):  atorvastatin 20 milliGRAM(s) Oral at bedtime  chlorhexidine 2% Cloths 1 Application(s) Topical daily  dextrose 5%. 1000 milliLiter(s) (100 mL/Hr) IV Continuous <Continuous>  dextrose 5%. 1000 milliLiter(s) (50 mL/Hr) IV Continuous <Continuous>  dextrose 50% Injectable 25 Gram(s) IV Push once  dextrose 50% Injectable 12.5 Gram(s) IV Push once  dextrose 50% Injectable 25 Gram(s) IV Push once  glucagon  Injectable 1 milliGRAM(s) IntraMuscular once  insulin NPH human recombinant 7 Unit(s) SubCutaneous every 6 hours  meropenem  IVPB      meropenem  IVPB 500 milliGRAM(s) IV Intermittent every 12 hours  norepinephrine Infusion 0.05 MICROgram(s)/kG/Min (3.56 mL/Hr) IV Continuous <Continuous>  vasopressin Infusion 0.04 Unit(s)/Min (6 mL/Hr) IV Continuous <Continuous>    MEDICATIONS  (PRN):  acetaminophen     Tablet .. 650 milliGRAM(s) Oral every 6 hours PRN Temp greater or equal to 38C (100.4F), Mild Pain (1 - 3), Moderate Pain (4 - 6)  dextrose Oral Gel 15 Gram(s) Oral once PRN Blood Glucose LESS THAN 70 milliGRAM(s)/deciliter      Objective:    Vitals: Vital Signs Last 24 Hrs  T(C): 36.7 (01-22-23 @ 04:00), Max: 36.7 (01-21-23 @ 08:00)  T(F): 98.1 (01-22-23 @ 04:00), Max: 98.1 (01-22-23 @ 04:00)  HR: 77 (01-22-23 @ 07:00) (68 - 83)  BP: 111/56 (01-21-23 @ 12:00) (111/56 - 117/61)  BP(mean): 69 (01-21-23 @ 12:00) (69 - 74)  RR: 27 (01-22-23 @ 07:00) (17 - 29)  SpO2: 93% (01-22-23 @ 07:00) (91% - 100%)            I&O's Summary    21 Jan 2023 07:01  -  22 Jan 2023 07:00  --------------------------------------------------------  IN: 1233 mL / OUT: 380 mL / NET: 853 mL        PHYSICAL EXAM:  GENERAL: Intubated  HEAD:  Atraumatic, Normocephalic  EYES: EOMI, conjunctiva and sclera clear  CHEST/LUNG: Intubated, on ventilator   HEART: Regular rate and rhythm; No murmurs, rubs, or gallops  ABDOMEN: Soft, Nontender, Nondistended;   SKIN: No rashes or lesions  NERVOUS SYSTEM:  Alert & Oriented X0, no focal deficits    LABS:  01-20    139  |  100  |  110<H>  ----------------------------<  162<H>  2.9<LL>   |  21<L>  |  5.15<H>    Ca    6.9<L>      20 Jan 2023 00:30    TPro  5.4<L>  /  Alb  1.8<L>  /  TBili  0.9  /  DBili  x   /  AST  213<H>  /  ALT  70<H>  /  AlkPhos  108  01-20                                              7.4    14.13 )-----------( 22       ( 20 Jan 2023 00:30 )             22.9     CAPILLARY BLOOD GLUCOSE      POCT Blood Glucose.: 194 mg/dL (21 Jan 2023 23:16)  POCT Blood Glucose.: <25 mg/dL (21 Jan 2023 23:10)  POCT Blood Glucose.: 140 mg/dL (21 Jan 2023 18:22)  POCT Blood Glucose.: 133 mg/dL (21 Jan 2023 11:43)      RADIOLOGY & ADDITIONAL TESTS:    Imaging Personally Reviewed:  [x ] YES  [ ] NO    Consultants involved in case:   Consultant(s) Notes Reviewed:  [ x] YES  [ ] NO:   Care Discussed with Consultants/Other Providers [x ] YES  [ ] NO

## 2023-01-22 NOTE — PROGRESS NOTE ADULT - ASSESSMENT
82M DM, CAD, CHF, Dementia, CKD  bibems accompanied by daughter after a fall this evening. Nephrology consulted for acute on CKD    CHIARA on CKD stage 4  baseline ~ 2.1 12/2022  CHIARA possible ATN vs prerenal  renal function continue to worsne  diuretic ACE on hold  Feurea <35% suggestive of prerenal  s/p bumex 1/16  urine output decreased   started gentle hydration with NS @ 75cc/hr x1L  poor prognosis. GOC conversation   monitor  abd us without hydro  monitor bmp and urine output  avoid nephrotoxic agents  As per discussion regarding HD with family and ICU providers.  Patient is on multiple vasopressure-Dr. Berg and KEVIN Diego spoke with wife and daughter and they don't want him to go through dialysis    proteinuria  mild  urine p/c ratio 0.2  not significant     acidosis  lactic acidosis   improving  monitor    hypokalemia  supplement per team  monitor    covid/pna  care per team    Was notified pt will be made comfort measure and have extubation on Tuesday. We will sign off.

## 2023-01-22 NOTE — PROGRESS NOTE ADULT - ASSESSMENT
====================ASSESSMENT======================  82M with HTN, DM2, HLD, Alzheimer's dementia who presented to the hospital initially for COVID PNA, also found to be in CHIARA s/p RRT for worsening AMS and hypoxemic respiratory failure, currently admitted to the MICU intubated and on multi-pressor support. CT chest w/ signs of multifocal PNA and likely aspiration as well as emphysema.    Plan:  ====================NEUROLOGY=======================  #Mental Status: AAOx1 on admission to hospital (baseline AAOx3)   - CT head negative on admission. Repeat CT head done (1/14) prior to MICU transfer with no acute changes.  - likely 2/2 metabolic encephalopathia i/s/o new COVID-19 pneumonia  - pt. currently intubated but off sedation    ====================RESPIRATORY======================  #Multifocal PNA   - acute hypoxic respiratory failure/sepsis 2/2 multifocal pneumonia, likely 2/2 aspiration. Pt. currently intubated and sedated  - BCx (1/13): NGTD, MRSA negative, legionella negative  - CT chest: multifocal consolidations most predominant in the left upper and right  lower lobes which may be due to infection. Additional possibility of a prior aspiration event should also be entertained given that there is extensive endobronchial secretions and impaction in the dependent posterior portion of the right lower lobe and dependent portions of the right upper lobe  - vent settings: volume A/C, RR 26, , FiO2 50%, PEEP 10   - s/p Zosyn (1/13-1/15), vancomycin (1/13-1/15; d/c'd as MRSA negative) and azithromycin (1/14-1/16; d/c'd as Legionella negative)  - c/w meropenem (1/15- ) for now, likely 7d course    #COVID Infection  - PCR positive 1/13, home test positive 1/11  - holding Remdesivir in setting of kidney failure  - d/c'd dexamethasone 6mg qd (1/13-1/17)    #Emphysema  - seen on CT chest by MICU team, likely 2/2 hx of smoking  - c/w Symbicort BID     ====================CARDIOVASCULAR==================  #Multifactorial Shock  - vasoplegic shock i/s/o sepsis +/- mild cardiogenic component, currently on multiple pressors. Now w/ skin mottling and dusky digits noted 1/18, worsening  - cortisol level 63  - VA duplex arterial UE/LE (1/18): no evidence of arterial occlusion  - wean pressors as tolerated; will cap pressors as low likelihood of meaningful recovery  - holding home BP and CHF meds as below  - monitor I/Os, daily weights, SCr, and urine output    #Hx of CHF  - diastolic disfunction grade 1-2 on POCUS  - TTE (1/15): calcified aortic valve, grossly moderate segmental LV systolic dysfunction, inferior and inferolateral wall hypokinesis. RV possibly hypokinetic  - holding home Entresto and torsemide in setting of CHIARA  - holding home carvedilol in setting of sepsis and hypotension   - monitor I/Os, daily weights  - replete electrolytes for K>4, Mg>2    #HLD (hyperlipidemia).   - c/w atorvastatin 20 mg qd    #HTN (hypertension)  - BP low on admission and patient is currently in shock and on multiple pressors  - holding home carvedilol, Entresto, amlodipine in setting of sepsis and hypotension  - continue to monitor BP    ====================/RENAL========================  #CHIARA on CKD  - baseline SCr 2.1 in 12/2022 per Nephrology. UA without evidence of infection,  - appreciate nephrology recommendations- due to pressor requirements, hypovolemia, and family declining, will defer HD  - monitor I/Os, daily weights, and urine output    #Electrolytes  - Maintain K>4, Phos>3, Mag>2, iCal>1    ====================GI/NUTRITION=====================  #Diet:  - NPO w/ tube feeds  - loose stools noted; d/c'd Senna, Miralax    ====================INFECTIOUS DISEASE================  #COVID Infection  - PCR positive 1/13, home test positive 1/11  - holding Remdesivir in setting of kidney failure  - d/c'd dexamethasone 6mg qd (1/13-1/17)    #Multifocal PNA   - acute hypoxic respiratory failure/sepsis 2/2 multifocal pneumonia, likely 2/2 aspiration. Pt. currently intubated and sedated  - BCx (1/13): NGTD, MRSA negative, legionella negative  - CT chest: multifocal consolidations most predominant in the left upper and right  lower lobes which may be due to infection. Additional possibility of a prior aspiration event should also be entertained given that there is extensive endobronchial secretions and impaction in the dependent posterior portion of the right lower lobe and dependent portions of the right upper lobe  - vent settings: volume A/C, RR 26, , FiO2 50%, PEEP 10  - s/p Zosyn (1/13-1/15), vancomycin (1/13-1/15; d/c'd as MRSA negative) and azithromycin (1/14-1/16; d/c'd as Legionella negative)  - c/w meropenem (1/15- ) for now, likely 7d course    ====================ENDOCRINE=======================  #DM2  - on Farxiga at home, unclear if for CHF vs DM  - A1c 6.3%  - while NPO, FSBG and moderate ISS q6h  - c/w NPH 7U q6h  - continue to monitor BGs    ====================HEMATOLOGIC/DVT PPx=============  #Thrombocytopenia  - plt 110 on admission, now downtrending, may be 2/2 acute sepsis vs. less likely HIT vs. other etiologies  - PF4 negative; JASPREET negative- unlikely to be HIT  - s/p 1U plt with good response   - holding heparin for now  - will stop lab draws as pt unlikely to make meaningful recovery given extensive skin mottling    #DVT PPx  - holding for now given thrombocytopenia  - SCDs    ====================ETHICS===========================  Code Status: DNR, pending additional discussions with family ====================ASSESSMENT======================  82M with HTN, DM2, HLD, Alzheimer's dementia who presented to the hospital initially for COVID PNA, also found to be in CHIARA s/p RRT for worsening AMS and hypoxemic respiratory failure, currently admitted to the MICU intubated and on multi-pressor support. CT chest w/ signs of multifocal PNA and likely aspiration as well as emphysema.    Plan:  ====================NEUROLOGY=======================  #Mental Status: AAOx1 on admission to hospital (baseline AAOx3)   - CT head negative on admission. Repeat CT head done (1/14) prior to MICU transfer with no acute changes.  - likely 2/2 metabolic encephalopathia i/s/o new COVID-19 pneumonia  - pt. currently intubated but off sedation  - Ativan 2 mg Q2H PRN started on 1/22 for agitation    ====================RESPIRATORY======================  #Multifocal PNA   - acute hypoxic respiratory failure/sepsis 2/2 multifocal pneumonia, likely 2/2 aspiration. Pt. currently intubated and sedated  - BCx (1/13): NGTD, MRSA negative, legionella negative  - CT chest: multifocal consolidations most predominant in the left upper and right  lower lobes which may be due to infection. Additional possibility of a prior aspiration event should also be entertained given that there is extensive endobronchial secretions and impaction in the dependent posterior portion of the right lower lobe and dependent portions of the right upper lobe  - vent settings: volume A/C, RR 26, , FiO2 50%, PEEP 10   - s/p Zosyn (1/13-1/15), vancomycin (1/13-1/15; d/c'd as MRSA negative) and azithromycin (1/14-1/16; d/c'd as Legionella negative)  - c/w meropenem (1/15- ) for now, likely 7d course    #COVID Infection  - PCR positive 1/13, home test positive 1/11  - holding Remdesivir in setting of kidney failure  - d/c'd dexamethasone 6mg qd (1/13-1/17)    #Emphysema  - seen on CT chest by MICU team, likely 2/2 hx of smoking  - c/w Symbicort BID     ====================CARDIOVASCULAR==================  #Multifactorial Shock  - vasoplegic shock i/s/o sepsis +/- mild cardiogenic component, currently on multiple pressors. Now w/ skin mottling and dusky digits noted 1/18, worsening  - cortisol level 63  - VA duplex arterial UE/LE (1/18): no evidence of arterial occlusion  - wean pressors as tolerated; will cap pressors as low likelihood of meaningful recovery  - holding home BP and CHF meds as below  - monitor I/Os, daily weights, SCr, and urine output    #Hx of CHF  - diastolic disfunction grade 1-2 on POCUS  - TTE (1/15): calcified aortic valve, grossly moderate segmental LV systolic dysfunction, inferior and inferolateral wall hypokinesis. RV possibly hypokinetic  - holding home Entresto and torsemide in setting of CHIARA  - holding home carvedilol in setting of sepsis and hypotension   - monitor I/Os, daily weights  - replete electrolytes for K>4, Mg>2    #HLD (hyperlipidemia).   - c/w atorvastatin 20 mg qd    #HTN (hypertension)  - BP low on admission and patient is currently in shock and on multiple pressors  - holding home carvedilol, Entresto, amlodipine in setting of sepsis and hypotension  - continue to monitor BP    ====================/RENAL========================  #CHIARA on CKD  - baseline SCr 2.1 in 12/2022 per Nephrology. UA without evidence of infection,  - appreciate nephrology recommendations- due to pressor requirements, hypovolemia, and family declining, will defer HD  - monitor I/Os, daily weights, and urine output    #Electrolytes  - Maintain K>4, Phos>3, Mag>2, iCal>1    ====================GI/NUTRITION=====================  #Diet:  - NPO w/ tube feeds  - loose stools noted; d/c'd Senna, Miralax    ====================INFECTIOUS DISEASE================  #COVID Infection  - PCR positive 1/13, home test positive 1/11  - holding Remdesivir in setting of kidney failure  - d/c'd dexamethasone 6mg qd (1/13-1/17)    #Multifocal PNA   - acute hypoxic respiratory failure/sepsis 2/2 multifocal pneumonia, likely 2/2 aspiration. Pt. currently intubated and sedated  - BCx (1/13): NGTD, MRSA negative, legionella negative  - CT chest: multifocal consolidations most predominant in the left upper and right  lower lobes which may be due to infection. Additional possibility of a prior aspiration event should also be entertained given that there is extensive endobronchial secretions and impaction in the dependent posterior portion of the right lower lobe and dependent portions of the right upper lobe  - vent settings: volume A/C, RR 26, , FiO2 50%, PEEP 10  - s/p Zosyn (1/13-1/15), vancomycin (1/13-1/15; d/c'd as MRSA negative) and azithromycin (1/14-1/16; d/c'd as Legionella negative)  - c/w meropenem (1/15- ) for now, likely 7d course    ====================ENDOCRINE=======================  #DM2  - on Farxiga at home, unclear if for CHF vs DM  - A1c 6.3%  - while NPO, FSBG and moderate ISS q6h  - c/w NPH 7U q6h  - continue to monitor BGs    ====================HEMATOLOGIC/DVT PPx=============  #Thrombocytopenia  - plt 110 on admission, now downtrending, may be 2/2 acute sepsis vs. less likely HIT vs. other etiologies  - PF4 negative; JASPREET negative- unlikely to be HIT  - s/p 1U plt with good response   - holding heparin for now  - will stop lab draws as pt unlikely to make meaningful recovery given extensive skin mottling    #DVT PPx  - holding for now given thrombocytopenia  - SCDs    ====================ETHICS===========================  Code Status: DNR, pending additional discussions with family. Plan for possible palliative extubation on 1/23, stopped insulin and unnecessary medications.

## 2023-01-23 NOTE — PROGRESS NOTE ADULT - ASSESSMENT
====================ASSESSMENT======================  82M with HTN, DM2, HLD, Alzheimer's dementia who presented to the hospital initially for COVID PNA, also found to be in CHIARA s/p RRT for worsening AMS and hypoxemic respiratory failure, currently admitted to the MICU intubated and on multi-pressor support. CT chest w/ signs of multifocal PNA and likely aspiration as well as emphysema. Currently focusing on comfort care and pending palliative extubation.    Plan:  ====================NEUROLOGY=======================  #Mental Status: AAOx1 on admission to hospital (baseline AAOx3)   - CT head negative on admission. Repeat CT head done (1/14) prior to MICU transfer with no acute changes.  - likely 2/2 metabolic encephalopathia i/s/o new COVID-19 pneumonia  - pt. currently intubated but off sedation  - c/w Ativan 2mg q2h PRN for agitation    ====================RESPIRATORY======================  #Multifocal PNA   - acute hypoxic respiratory failure/sepsis 2/2 multifocal pneumonia, likely 2/2 aspiration. Pt. currently intubated and sedated  - BCx (1/13): NGTD, MRSA negative, legionella negative  - CT chest: multifocal consolidations most predominant in the left upper and right  lower lobes which may be due to infection. Additional possibility of a prior aspiration event should also be entertained given that there is extensive endobronchial secretions and impaction in the dependent posterior portion of the right lower lobe and dependent portions of the right upper lobe  - vent settings: volume A/C, RR 26, , FiO2 50%, PEEP 10   - s/p Zosyn (1/13-1/15), vancomycin (1/13-1/15; d/c'd as MRSA negative) and azithromycin (1/14-1/16; d/c'd as Legionella negative)  - s/p meropenem (1/15-1/22)    #COVID Infection  - PCR positive 1/13, home test positive 1/11  - holding Remdesivir in setting of kidney failure  - s/p dexamethasone 6mg qd (1/13-1/17)    #Emphysema  - seen on CT chest by MICU team, likely 2/2 hx of smoking    ====================CARDIOVASCULAR==================  #Multifactorial Shock  - vasoplegic shock i/s/o sepsis +/- mild cardiogenic component, currently on multiple pressors. Now w/ skin mottling and dusky digits noted 1/18, worsening  - cortisol level 63  - VA duplex arterial UE/LE (1/18): no evidence of arterial occlusion  - capping pressors as low likelihood of meaningful recovery; plan for eventual palliative extubation  - holding home BP and CHF meds as below    #Hx of CHF  - diastolic disfunction grade 1-2 on POCUS  - TTE (1/15): calcified aortic valve, grossly moderate segmental LV systolic dysfunction, inferior and inferolateral wall hypokinesis. RV possibly hypokinetic  - holding home Entresto and torsemide in setting of CHIARA  - holding home carvedilol in setting of sepsis and hypotension     #HLD (hyperlipidemia).   - d/c'd atorvastatin 20 mg qd    #HTN (hypertension)  - BP low on admission and patient is currently in shock and on multiple pressors  - holding home carvedilol, Entresto, amlodipine in setting of sepsis and hypotension    ====================/RENAL========================  #CHIARA on CKD  - baseline SCr 2.1 in 12/2022 per Nephrology. UA without evidence of infection,  - appreciate nephrology recommendations- due to pressor requirements, hypovolemia, and family declining, will defer HD    ====================GI/NUTRITION=====================  #Diet:  - NPO w/ tube feeds  - loose stools noted; d/c'd Senna, Miralax    ====================INFECTIOUS DISEASE================  #COVID Infection  - PCR positive 1/13, home test positive 1/11  - holding Remdesivir in setting of kidney failure  - s/p dexamethasone 6mg qd (1/13-1/17)    #Multifocal PNA   - acute hypoxic respiratory failure/sepsis 2/2 multifocal pneumonia, likely 2/2 aspiration. Pt. currently intubated and sedated  - BCx (1/13): NGTD, MRSA negative, legionella negative  - CT chest: multifocal consolidations most predominant in the left upper and right  lower lobes which may be due to infection. Additional possibility of a prior aspiration event should also be entertained given that there is extensive endobronchial secretions and impaction in the dependent posterior portion of the right lower lobe and dependent portions of the right upper lobe  - vent settings: volume A/C, RR 26, , FiO2 50%, PEEP 10  - s/p Zosyn (1/13-1/15), vancomycin (1/13-1/15; d/c'd as MRSA negative) and azithromycin (1/14-1/16; d/c'd as Legionella negative)  - s/p meropenem (1/15-1/22)    ====================ENDOCRINE=======================  #DM2  - on Farxiga at home, unclear if for CHF vs DM  - A1c 6.3%  - while NPO, FSBG and moderate ISS q6h  - s/p NPH 7U q6h  - no further fingersticks     ====================HEMATOLOGIC/DVT PPx=============  #Thrombocytopenia  - plt 110 on admission, now downtrending, may be 2/2 acute sepsis vs. less likely HIT vs. other etiologies  - PF4 negative; JASPREET negative- unlikely to be HIT  - s/p 1U plt with good response   - holding heparin for now  - will stop lab draws as pt unlikely to make meaningful recovery given extensive skin mottling    #DVT PPx  - holding for now given thrombocytopenia  - SCDs    ====================ETHICS===========================  Code Status: DNR, pending additional discussions with family. Plan for possible palliative extubation on 1/23, stopped insulin and unnecessary medications

## 2023-01-23 NOTE — PROGRESS NOTE ADULT - SUBJECTIVE AND OBJECTIVE BOX
*******************************  Ger Toney MD (PGY-1)  Internal Medicine  Contact via [employer] TEAMS  [facility] Pager: [phone]  [facility] Pager: [phone]  *******************************    INTERVAL HPI/OVERNIGHT EVENTS: No acute events overnight.    SUBJECTIVE: Patient seen and examined at bedside.     MEDICATIONS  (STANDING):  chlorhexidine 2% Cloths 1 Application(s) Topical daily  norepinephrine Infusion 0.05 MICROgram(s)/kG/Min (3.56 mL/Hr) IV Continuous <Continuous>  vasopressin Infusion 0.04 Unit(s)/Min (6 mL/Hr) IV Continuous <Continuous>    MEDICATIONS  (PRN):  acetaminophen     Tablet .. 650 milliGRAM(s) Oral every 6 hours PRN Temp greater or equal to 38C (100.4F), Mild Pain (1 - 3), Moderate Pain (4 - 6)  LORazepam   Injectable 2 milliGRAM(s) IV Push every 2 hours PRN Agitation    ALLERGIES:  Allergies  No Known Allergies  Intolerances    VITAL SIGNS:  ICU Vital Signs Last 24 Hrs  T(C): 36.4 (23 Jan 2023 04:00), Max: 37.2 (22 Jan 2023 08:00)  T(F): 97.5 (23 Jan 2023 04:00), Max: 99 (22 Jan 2023 08:00)  HR: 83 (23 Jan 2023 06:00) (73 - 84)  BP: --  BP(mean): --  ABP: 114/50 (23 Jan 2023 06:00) (95/37 - 124/53)  ABP(mean): 74 (23 Jan 2023 06:00) (57 - 81)  RR: 27 (23 Jan 2023 06:00) (22 - 29)  SpO2: 97% (23 Jan 2023 06:00) (91% - 100%)    O2 Parameters below as of 23 Jan 2023 06:00  Patient On (Oxygen Delivery Method): ventilator    O2 Concentration (%): 60    Mode: AC/ CMV (Assist Control/ Continuous Mandatory Ventilation), RR (machine): 26, TV (machine): 500, FiO2: 60, PEEP: 10, ITime: 0.8, MAP: 18, PIP: 33  Plateau pressure:   P/F ratio:     01-22 @ 07:01  -  01-23 @ 07:00  --------------------------------------------------------  IN: 1265 mL / OUT: 1015 mL / NET: 250 mL    CAPILLARY BLOOD GLUCOSE    POCT Blood Glucose.: 194 mg/dL (21 Jan 2023 23:16)    ECG:    PHYSICAL EXAM:  GENERAL: intubated but off sedation  HEENT: EOMI. PERRL  NECK: Supple, no JVD  HEART: S1, S2, regular rate and rhythm, no murmurs, rubs, or gallops  LUNGS: +b/l rales, rhonchi  ABDOMEN: Soft, nontender, nondistended, +BS  EXTREMITIES: nonpalpable peripheral pulses. 1+ pitting edema b/l LE and UE  NERVOUS SYSTEM: intubated, not waking up or following commands  SKIN: mottling noted on lower extremities, dusky toes and finger    LABS:  - focusing on comfort care, no lab draws    RADIOLOGY & ADDITIONAL TESTS:

## 2023-01-24 NOTE — PROGRESS NOTE ADULT - ASSESSMENT
====================ASSESSMENT======================  82M with HTN, DM2, HLD, Alzheimer's dementia who presented to the hospital initially for COVID PNA, also found to be in CHIARA s/p RRT for worsening AMS and hypoxemic respiratory failure, currently admitted to the MICU intubated and on multi-pressor support. CT chest w/ signs of multifocal PNA and likely aspiration as well as emphysema. Currently focusing on comfort care and pending palliative extubation.    Plan:  ====================NEUROLOGY=======================  #Mental Status: AAOx1 on admission to hospital (baseline AAOx3)   - CT head negative on admission. Repeat CT head done (1/14) prior to MICU transfer with no acute changes.  - likely 2/2 metabolic encephalopathia i/s/o new COVID-19 pneumonia  - pt. currently intubated but off sedation  - c/w Ativan 2mg q2h PRN for agitation    ====================RESPIRATORY======================  #Multifocal PNA   - acute hypoxic respiratory failure/sepsis 2/2 multifocal pneumonia, likely 2/2 aspiration. Pt. currently intubated and sedated  - BCx (1/13): NGTD, MRSA negative, legionella negative  - CT chest: multifocal consolidations most predominant in the left upper and right  lower lobes which may be due to infection. Additional possibility of a prior aspiration event should also be entertained given that there is extensive endobronchial secretions and impaction in the dependent posterior portion of the right lower lobe and dependent portions of the right upper lobe  - vent settings: volume A/C, RR 26, , FiO2 50%, PEEP 10   - s/p Zosyn (1/13-1/15), vancomycin (1/13-1/15; d/c'd as MRSA negative) and azithromycin (1/14-1/16; d/c'd as Legionella negative)  - s/p meropenem (1/15-1/22)    #COVID Infection  - PCR positive 1/13, home test positive 1/11  - holding Remdesivir in setting of kidney failure  - s/p dexamethasone 6mg qd (1/13-1/17)    #Emphysema  - seen on CT chest by MICU team, likely 2/2 hx of smoking    ====================CARDIOVASCULAR==================  #Multifactorial Shock  - vasoplegic shock i/s/o sepsis +/- mild cardiogenic component, currently on multiple pressors. Now w/ skin mottling and dusky digits noted 1/18, worsening  - cortisol level 63  - VA duplex arterial UE/LE (1/18): no evidence of arterial occlusion  - capping pressors as low likelihood of meaningful recovery; plan for eventual palliative extubation  - holding home BP and CHF meds as below    #Hx of CHF  - diastolic disfunction grade 1-2 on POCUS  - TTE (1/15): calcified aortic valve, grossly moderate segmental LV systolic dysfunction, inferior and inferolateral wall hypokinesis. RV possibly hypokinetic  - holding home Entresto and torsemide in setting of CHIARA  - holding home carvedilol in setting of sepsis and hypotension     #HLD (hyperlipidemia).   - d/c'd atorvastatin 20 mg qd    #HTN (hypertension)  - BP low on admission and patient is currently in shock and on multiple pressors  - holding home carvedilol, Entresto, amlodipine in setting of sepsis and hypotension    ====================/RENAL========================  #CHIARA on CKD  - baseline SCr 2.1 in 12/2022 per Nephrology. UA without evidence of infection,  - appreciate nephrology recommendations- due to pressor requirements, hypovolemia, and family declining, will defer HD    ====================GI/NUTRITION=====================  #Diet:  - NPO w/ tube feeds  - loose stools noted; d/c'd Senna, Miralax    ====================INFECTIOUS DISEASE================  #COVID Infection  - PCR positive 1/13, home test positive 1/11  - holding Remdesivir in setting of kidney failure  - s/p dexamethasone 6mg qd (1/13-1/17)    #Multifocal PNA   - acute hypoxic respiratory failure/sepsis 2/2 multifocal pneumonia, likely 2/2 aspiration. Pt. currently intubated and sedated  - BCx (1/13): NGTD, MRSA negative, legionella negative  - CT chest: multifocal consolidations most predominant in the left upper and right  lower lobes which may be due to infection. Additional possibility of a prior aspiration event should also be entertained given that there is extensive endobronchial secretions and impaction in the dependent posterior portion of the right lower lobe and dependent portions of the right upper lobe  - vent settings: volume A/C, RR 26, , FiO2 50%, PEEP 10  - s/p Zosyn (1/13-1/15), vancomycin (1/13-1/15; d/c'd as MRSA negative) and azithromycin (1/14-1/16; d/c'd as Legionella negative)  - s/p meropenem (1/15-1/22)    ====================ENDOCRINE=======================  #DM2  - on Farxiga at home, unclear if for CHF vs DM  - A1c 6.3%  - while NPO, FSBG and moderate ISS q6h  - s/p NPH 7U q6h  - no further fingersticks     ====================HEMATOLOGIC/DVT PPx=============  #Thrombocytopenia  - plt 110 on admission, now downtrending, may be 2/2 acute sepsis vs. less likely HIT vs. other etiologies  - PF4 negative; JASPREET negative- unlikely to be HIT  - s/p 1U plt with good response   - holding heparin for now  - will stop lab draws as pt unlikely to make meaningful recovery given extensive skin mottling    #DVT PPx  - holding for now given thrombocytopenia  - SCDs    ====================ETHICS===========================  Code Status: DNR, pending additional discussions with family. Plan for possible palliative extubation on 1/24, stopped insulin and unnecessary medications

## 2023-01-24 NOTE — PROGRESS NOTE ADULT - SUBJECTIVE AND OBJECTIVE BOX
*******************************  Ger Toney MD (PGY-1)  Internal Medicine  Contact via [employer] TEAMS  [facility] Pager: [phone]  [facility] Pager: [phone]  *******************************    INTERVAL HPI/OVERNIGHT EVENTS: No acute events overnight.    SUBJECTIVE: Patient seen and examined at bedside.     MEDICATIONS  (STANDING):  chlorhexidine 2% Cloths 1 Application(s) Topical daily  norepinephrine Infusion 0.05 MICROgram(s)/kG/Min (3.56 mL/Hr) IV Continuous <Continuous>  vasopressin Infusion 0.04 Unit(s)/Min (6 mL/Hr) IV Continuous <Continuous>    MEDICATIONS  (PRN):  acetaminophen     Tablet .. 650 milliGRAM(s) Oral every 6 hours PRN Temp greater or equal to 38C (100.4F), Mild Pain (1 - 3), Moderate Pain (4 - 6)  LORazepam   Injectable 2 milliGRAM(s) IV Push every 2 hours PRN Agitation    ALLERGIES:  Allergies  No Known Allergies  Intolerances    VITAL SIGNS:  ICU Vital Signs Last 24 Hrs  T(C): 36.4 (24 Jan 2023 04:00), Max: 36.7 (24 Jan 2023 00:00)  T(F): 97.5 (24 Jan 2023 04:00), Max: 98 (24 Jan 2023 00:00)  HR: 81 (24 Jan 2023 06:46) (72 - 85)  BP: --  BP(mean): --  ABP: 132/58 (24 Jan 2023 06:00) (102/46 - 132/58)  ABP(mean): 86 (24 Jan 2023 06:00) (65 - 86)  RR: 23 (24 Jan 2023 06:00) (20 - 27)  SpO2: 94% (24 Jan 2023 06:46) (91% - 100%)    O2 Parameters below as of 23 Jan 2023 20:00    O2 Concentration (%): 60    Mode: AC/ CMV (Assist Control/ Continuous Mandatory Ventilation), RR (machine): 26, TV (machine): 500, FiO2: 80, PEEP: 10, ITime: 0.8, MAP: 18, PIP: 35  Plateau pressure:   P/F ratio:     01-22 @ 07:01 - 01-23 @ 07:00  --------------------------------------------------------  IN: 1265 mL / OUT: 1015 mL / NET: 250 mL    01-23 @ 07:01 - 01-24 @ 06:50  --------------------------------------------------------  IN: 1150.2 mL / OUT: 697 mL / NET: 453.2 mL    CAPILLARY BLOOD GLUCOSE    ECG:    PHYSICAL EXAM:  GENERAL: intubated but off sedation  HEENT: EOMI. PERRL  NECK: Supple, no JVD  HEART: S1, S2, regular rate and rhythm, no murmurs, rubs, or gallops  LUNGS: +b/l rales, rhonchi  ABDOMEN: Soft, nontender, nondistended, +BS  EXTREMITIES: nonpalpable peripheral pulses. 1+ pitting edema b/l LE and UE  NERVOUS SYSTEM: intubated, not waking up or following commands  SKIN: mottling noted on lower extremities, dusky toes and finger    LABS:  - focusing on comfort care, no lab draws    RADIOLOGY & ADDITIONAL TESTS:

## 2023-01-25 NOTE — PROGRESS NOTE ADULT - ASSESSMENT
====================ASSESSMENT======================  82M with HTN, DM2, HLD, Alzheimer's dementia who presented to the hospital initially for COVID PNA, also found to be in CHIARA s/p RRT for worsening AMS and hypoxemic respiratory failure, currently admitted to the MICU intubated and on multi-pressor support. CT chest w/ signs of multifocal PNA and likely aspiration as well as emphysema. Currently focusing on comfort care and pending palliative extubation today 1/25/2023.    Plan:  ====================NEUROLOGY=======================  #Mental Status: AAOx1 on admission to hospital (baseline AAOx3)   - CT head negative on admission. Repeat CT head done (1/14) prior to MICU transfer with no acute changes.  - likely 2/2 metabolic encephalopathia i/s/o new COVID-19 pneumonia  - pt. currently intubated but off sedation  - c/w Ativan 2mg q2h PRN for agitation  - c/w Dilaudid 0.5mg q2h PRN for pain    ====================RESPIRATORY======================  #Multifocal PNA   - acute hypoxic respiratory failure/sepsis 2/2 multifocal pneumonia, likely 2/2 aspiration. Pt. currently intubated and sedated  - BCx (1/13): NGTD, MRSA negative, legionella negative  - CT chest: multifocal consolidations most predominant in the left upper and right  lower lobes which may be due to infection. Additional possibility of a prior aspiration event should also be entertained given that there is extensive endobronchial secretions and impaction in the dependent posterior portion of the right lower lobe and dependent portions of the right upper lobe  - vent settings: volume A/C, RR 26, , FiO2 80%, PEEP 10; plan for palliative extubation 1/25/2023  - s/p Zosyn (1/13-1/15), vancomycin (1/13-1/15; d/c'd as MRSA negative) and azithromycin (1/14-1/16; d/c'd as Legionella negative)  - s/p meropenem (1/15-1/22)  - c/w glycopyrrolate 0.4mg q2h PRN for secretions     #COVID Infection  - PCR positive 1/13, home test positive 1/11  - holding Remdesivir in setting of kidney failure  - s/p dexamethasone 6mg qd (1/13-1/17)    #Emphysema  - seen on CT chest by MICU team, likely 2/2 hx of smoking    ====================CARDIOVASCULAR==================  #Multifactorial Shock  - vasoplegic shock i/s/o sepsis +/- mild cardiogenic component, currently on multiple pressors. Now w/ skin mottling and dusky digits noted 1/18, worsening  - cortisol level 63  - VA duplex arterial UE/LE (1/18): no evidence of arterial occlusion  - capping pressors as low likelihood of meaningful recovery; plan for eventual palliative extubation  - holding home BP and CHF meds as below    #Hx of CHF  - diastolic disfunction grade 1-2 on POCUS  - TTE (1/15): calcified aortic valve, grossly moderate segmental LV systolic dysfunction, inferior and inferolateral wall hypokinesis. RV possibly hypokinetic  - holding home Entresto and torsemide in setting of CHIARA  - holding home carvedilol in setting of sepsis and hypotension     #HLD (hyperlipidemia).   - d/c'd atorvastatin 20 mg qd    #HTN (hypertension)  - BP low on admission and patient is currently in shock and on multiple pressors  - holding home carvedilol, Entresto, amlodipine in setting of sepsis and hypotension    ====================/RENAL========================  #CHIARA on CKD  - baseline SCr 2.1 in 12/2022 per Nephrology. UA without evidence of infection,  - appreciate nephrology recommendations- due to pressor requirements, hypovolemia, and family declining, will defer HD    ====================GI/NUTRITION=====================  #Diet:  - NPO w/ tube feeds  - loose stools noted; d/c'd Senna, Miralax    ====================INFECTIOUS DISEASE================  #COVID Infection  - PCR positive 1/13, home test positive 1/11  - holding Remdesivir in setting of kidney failure  - s/p dexamethasone 6mg qd (1/13-1/17)    #Multifocal PNA   - acute hypoxic respiratory failure/sepsis 2/2 multifocal pneumonia, likely 2/2 aspiration. Pt. currently intubated and sedated  - BCx (1/13): NGTD, MRSA negative, legionella negative  - CT chest: multifocal consolidations most predominant in the left upper and right  lower lobes which may be due to infection. Additional possibility of a prior aspiration event should also be entertained given that there is extensive endobronchial secretions and impaction in the dependent posterior portion of the right lower lobe and dependent portions of the right upper lobe  - vent settings: volume A/C, RR 26, , FiO2 50%, PEEP 10  - s/p Zosyn (1/13-1/15), vancomycin (1/13-1/15; d/c'd as MRSA negative) and azithromycin (1/14-1/16; d/c'd as Legionella negative)  - s/p meropenem (1/15-1/22)    ====================ENDOCRINE=======================  #DM2  - on Farxiga at home, unclear if for CHF vs DM  - A1c 6.3%  - while NPO, FSBG and moderate ISS q6h  - s/p NPH 7U q6h  - no further fingersticks     ====================HEMATOLOGIC/DVT PPx=============  #Thrombocytopenia  - plt 110 on admission, now downtrending, may be 2/2 acute sepsis vs. less likely HIT vs. other etiologies  - PF4 negative; JASPREET negative- unlikely to be HIT  - s/p 1U plt with good response   - holding heparin  - will stop lab draws as pt unlikely to make meaningful recovery given extensive skin mottling    #DVT PPx  - holding heparin  - SCDs    ====================ETHICS===========================  Code Status: DNR. Plan for palliative extubation on 1/25, stopped insulin and unnecessary medications

## 2023-01-25 NOTE — PROGRESS NOTE ADULT - CRITICAL CARE SERVICES
Colonoscopy Procedure Note      Place of Service: [facility]    Patient: Daniel Luna    MRN# [id number]    Date of procedure: 3/17/2021    Surgeon: Sary Sanchez MD    Primary Physician: Darrian Rosenthal MD    Operative Procedure: Colonoscopy    Preoperative Diagnosis: Screening Colonoscopy no prior colonoscopy.    Anesthesia Administered: Fentanyl 150 mcg IV, Versed 6 mg IV and Benadryl 50 mg IV    Procedure Description: The patient was placed in the left lateral position and monitored continuously through ECG tracing, pulse oximetry monitoring and direct observations. Medications were administered incrementally over the course of the procedure to achieve an adequate level of conscious sedation. After anorectal examination was performed, the Olympus PCF-H180AL was inserted into the rectum and advanced under direct vision to the terminal ileum. The procedure was not considered more difficult than average.    During withdrawal examination, the final quality of the prep was [place] bowel prep scale.  Right colon:2- (minor amount of residual staining, small fragments of stool, and\or opaque liquid, but mucosa of colon segment is seen well)  Transverse colon: 2- (minor amount of residual staining, small fragments of stool, and\or opaque liquid, but mucosa of colon segment is seen well)  Left: 2- (minor amount of residual staining, small fragments of stool, and\or opaque liquid, but mucosa of colon segment is seen well)    A careful inspection was made as the colonoscope was withdrawn. A retroflexed view of the rectum was performed.  Findings and interventions are described below. Appropriate photo documentation was obtained.    Overall Daniel Luna tolerated the procedure well, without undue discomfort, hypotension or desaturation. The patient was adequately recovered in the endoscopy suite and was transported to PACU.    Events:   Event Tracking     Panel 1     Procedure : COLONOSCOPY      Event Time 
In    Procedure Start 0958    Procedure End 1018             Findings:  Anorectal: Normal and Anoscopy shows EH  Terminal ileum:Normal mucosa  Cecum: Normal mucosa  Ascending colon: Normal mucosa and polyp:  3 mm, removed with Jumbo forceps  Transverse colon:  Normal mucosa  Descending colon: Normal mucosa and Polyp x 2:  5-6 mm, sessile, removed with cold snare  Sigmoid colon: Normal mucosa and Polyps x4: 5 mm, sessile, removed with cold snare and other polyps were 2-3 mm and removed with Jumbo forceps  Rectum: Normal mucosa and Retroflexion revealed:  small hemorrhoid(s)    Interventions:   2 Polyps removed by cold snare polypectomy  5 small polyps were removed with cold forceps    Estimated blood loss: <10 cc.    Complications: none    Impression:  1. Seven small polyps were removed   2. Small external hemorrhoids    Recommendations  · Awaiting pathology results due to biopsy(s) performed.  · Resume previous diet  · Follow up colonoscopy in 3-5 years, depending on pathology  · Start a fiber supplement such as Metamucil                                    
35
45
35
55
55

## 2023-01-25 NOTE — DISCHARGE NOTE FOR THE EXPIRED PATIENT - HOSPITAL COURSE
82M with hypertension, type 2 diabetes, hyperlipidemia, Alzheimer's dementia who presented to the hospital initially for acute hypoxic respiratory failure 2/2 COVID pneumonia and altered mental status. A rapid response was called on 1/14/2023 for progression of hypoxemia and worsening altered mental status/unresponsiveness. The patient was intubated and started on pressors prior to being transferred to the medical ICU. Imaging showed multifocal pneumonia, likely aspiration, and emphysema. The patient was treated with empiric antibiotics and high dose steroids for COVID hypoxia. During this admission, the patient continued to have worsening shock that required multiple vasopressors. He also had worsening renal function with rising creatinine. After extensive discussions with family, however, the decision was made not to pursue hemodialysis at this time. On 1/18, the patient was noted to have skin mottling and dusky digits. Imaging was negative for arterial clots. After further goals of care discussions with the family, the decision was made to focus on comfort care and to cap pressors, limit blood work, and discontinue nonessential medications.    On 1/25/2023, the patient was noted to be pulseless. On physical exam, patient did not respond to verbal or noxious stimuli, had no spontaneous respirations, had absent heart and breath sounds, and absent radial and carotid pulses. Pupils were fixed and dilated. On monitor, the patient was asystole. The patient was pronounced dead at 12:34PM on 1/25/2023. Family declined autopsy. 82M with hypertension, type 2 diabetes, hyperlipidemia, Alzheimer's dementia who presented to the hospital initially for acute hypoxic respiratory failure 2/2 COVID pneumonia and altered mental status. A rapid response was called on 1/14/2023 for progression of hypoxemia and worsening altered mental status/unresponsiveness. The patient was intubated and started on pressors prior to being transferred to the medical ICU. Imaging showed multifocal pneumonia, likely aspiration, and emphysema. The patient was treated with empiric antibiotics and high dose steroids for COVID hypoxia. During this admission, the patient continued to have worsening septic shock that required multiple vasopressors. He also had worsening renal function with rising creatinine. After extensive discussions with family, however, the decision was made not to pursue hemodialysis at this time. On 1/18, the patient was noted to have skin mottling and dusky digits. Imaging was negative for arterial clots. After further goals of care discussions with the family, the decision was made to focus on comfort care and to cap pressors, limit blood work, discontinue nonessential medications and palliatively extubate the patient.    On 1/25/2023, the patient was noted to be pulseless. On physical exam, patient did not respond to verbal or noxious stimuli, had no spontaneous respirations, had absent heart and breath sounds, and absent radial and carotid pulses. Pupils were fixed and dilated. On monitor, the patient was asystole. The patient was pronounced dead at 12:34PM on 1/25/2023. Family declined autopsy.

## 2023-01-25 NOTE — CHART NOTE - NSCHARTNOTESELECT_GEN_ALL_CORE
Event Note
Follow Up/Nutrition Services
Follow Up/Nutrition Services
POCUS/Event Note
Death Note
Event Note
Event Note
pocus/Event Note

## 2023-01-25 NOTE — PROGRESS NOTE ADULT - REASON FOR ADMISSION
COVID

## 2023-01-25 NOTE — CHART NOTE - NSCHARTNOTEFT_GEN_A_CORE
DEATH NOTE    Called to bedside to evaluate the patient for pulselessness.     On physical exam, patient did not respond to verbal or noxious stimuli. No spontaneous respirations. Absent heart and breath sounds. Absent radial and carotid pulses. Pupils are fixed and dilated. EKG rhythm strip shows asystole. Patient pronounced dead at 12:34 PM on 1/25/2023. Attending Dr. Howard Jules notified. Family declined autopsy.    *******************************  Ger Toney MD (PGY-1)  Internal Medicine  Contact via [employer] TEAMS  [facility] Pager: [phone]  [facility] Pager: [phone]  *******************************

## 2023-01-25 NOTE — CHART NOTE - NSCHARTNOTEFT_GEN_A_CORE
NUTRITION FOLLOW-UP    2M with HTN, DM2, HLD, Alzheimer's dementia who presented to the hospital initially for COVID PNA, also found to be in CHIARA s/p RRT for worsening AMS and hypoxemic respiratory failure, currently admitted to the MICU intubated and on multi-pressor support. CT chest w/ signs of multifocal PNA and likely aspiration as well as emphysema.  Course significant for CHIARA on CKD. Per chart, due to pressor requirements, hypovolemia, and family declining, will defer HD. Poor prognosis per nephrology note.       Remains intubated on pressors.  Receiving Nepro via OGT @ prescribed goal of 45mL/hr.  Along with NoCarb Prosource, this adequately meets estimated energy/nutrient needs.  Pt. without any noted/reported intolerance to TF @ this time (no vomiting/constipation or abdominal distention). ~650mL rectal tube output within past 24hrs.        Pt. with physical findings of muscle wasting & subcutaneous fat loss:  temples - severe  clavicles - severe    Weight increase likely related to [severe] and worsening fluid accumulation.      Pt. deemed acutely & chronically severely malnourished.       Pt. pending palliative extubation today.      _________________Diet___________________  Diet, NPO with Tube Feed:   Tube Feeding Modality: Orogastric  Nepro with Carb Steady (NEPRORTH)  Total Volume for 24 Hours (mL): 1080  Continuous  Starting Tube Feed Rate {mL per Hour}: 10  Increase Tube Feed Rate by (mL): 10     Every 4 hours  Until Goal Tube Feed Rate (mL per Hour): 45  Tube Feed Duration (in Hours): 24  Tube Feed Start Time: 12:00  No Carb Prosource TF     Qty per Day:  1 (01-20-23 @ 13:50)          Weight:                                  Height: 70"                   Ideal Body Weight: 166lbs /75.5kg  87.5kg (1/25)  88.1kg (1/20)  85.6kg (1/18)  78.4kg (1/15)    _____Estimated Energy Needs (based on admit weight)______      Edema: 3+ generalized  Skin: Right ear deep tissue injury         ________________________Pertinent Medications____________   MEDICATIONS  (STANDING):  chlorhexidine 2% Cloths 1 Application(s) Topical daily  norepinephrine Infusion 0.05 MICROgram(s)/kG/Min (3.56 mL/Hr) IV Continuous <Continuous>  vasopressin Infusion 0.04 Unit(s)/Min (6 mL/Hr) IV Continuous <Continuous>    MEDICATIONS  (PRN):  acetaminophen     Tablet .. 650 milliGRAM(s) Oral every 6 hours PRN Temp greater or equal to 38C (100.4F), Mild Pain (1 - 3), Moderate Pain (4 - 6)  glycopyrrolate Injectable 0.2 milliGRAM(s) IV Push every 2 hours PRN Secretions  HYDROmorphone  Injectable 0.5 milliGRAM(s) IV Push every 2 hours PRN Moderate pain (4-6), Severe pain (7-10), Respiratory rate greater than 22  LORazepam   Injectable 2 milliGRAM(s) IV Push every 2 hours PRN Agitation          _________________________Pertinent Labs____________________                                                          CAPILLARY BLOOD GLUCOSE            _______New Nutrition Dx________  Pt. meets criteria for severe malnutrition in the context of acute [critical] illness & chronic illness in setting of Alzheimer's dementia as evidenced by 3+ generalized edema, & severe muscle wasting.        PLAN/RECOMMENDATIONS:    1) No further acute nutrition interventions warranted @ this time.        RDN remains available and will f/u PRN.          Priti Wahl RDN, CDN pager [phone]

## 2023-01-25 NOTE — DIETITIAN NUTRITION RISK NOTIFICATION - ADDITIONAL COMMENTS/DIETITIAN RECOMMENDATIONS
Nutrition follow up completed. Please refer to chart note via documents section in EMR for further recommendations.  Last updated: 1/25/2023

## 2023-01-25 NOTE — PROGRESS NOTE ADULT - SUBJECTIVE AND OBJECTIVE BOX
*******************************  Ger Toney MD (PGY-1)  Internal Medicine  Contact via [employer] TEAMS  [facility] Pager: [phone]  [facility] Pager: [phone]  *******************************    INTERVAL HPI/OVERNIGHT EVENTS: No acute events overnight.    SUBJECTIVE: Patient seen and examined at bedside.     MEDICATIONS  (STANDING):  chlorhexidine 2% Cloths 1 Application(s) Topical daily  norepinephrine Infusion 0.05 MICROgram(s)/kG/Min (3.56 mL/Hr) IV Continuous <Continuous>  vasopressin Infusion 0.04 Unit(s)/Min (6 mL/Hr) IV Continuous <Continuous>    MEDICATIONS  (PRN):  acetaminophen     Tablet .. 650 milliGRAM(s) Oral every 6 hours PRN Temp greater or equal to 38C (100.4F), Mild Pain (1 - 3), Moderate Pain (4 - 6)  LORazepam   Injectable 2 milliGRAM(s) IV Push every 2 hours PRN Agitation    ALLERGIES:  Allergies    No Known Allergies    Intolerances    VITAL SIGNS:  ICU Vital Signs Last 24 Hrs  T(C): 37.3 (25 Jan 2023 04:00), Max: 37.3 (25 Jan 2023 04:00)  T(F): 99.1 (25 Jan 2023 04:00), Max: 99.1 (25 Jan 2023 04:00)  HR: 75 (25 Jan 2023 04:00) (75 - 87)  BP: --  BP(mean): --  ABP: 80/34 (25 Jan 2023 04:00) (80/34 - 120/51)  ABP(mean): 50 (25 Jan 2023 04:00) (50 - 74)  RR: 26 (25 Jan 2023 04:00) (26 - 28)  SpO2: 98% (25 Jan 2023 04:00) (92% - 99%)    O2 Parameters below as of 25 Jan 2023 04:00  Patient On (Oxygen Delivery Method): ventilator    O2 Concentration (%): 80    Mode: AC/ CMV (Assist Control/ Continuous Mandatory Ventilation), RR (machine): 26, TV (machine): 500, FiO2: 80, PEEP: 10, ITime: 0.8, MAP: 17, PIP: 32  Plateau pressure:   P/F ratio:     01-23 @ 07:01 - 01-24 @ 07:00  --------------------------------------------------------  IN: 1205.2 mL / OUT: 697 mL / NET: 508.2 mL    01-24 @ 07:01 - 01-25 @ 06:58  --------------------------------------------------------  IN: 1196 mL / OUT: 475 mL / NET: 721 mL    CAPILLARY BLOOD GLUCOSE    ECG:    PHYSICAL EXAM:  GENERAL: intubated but off sedation  HEENT: EOMI. PERRL  NECK: Supple, no JVD  HEART: S1, S2, regular rate and rhythm, no murmurs, rubs, or gallops  LUNGS: +b/l rales, rhonchi  ABDOMEN: Soft, nontender, nondistended, +BS  EXTREMITIES: nonpalpable peripheral pulses. 1+ pitting edema b/l LE and UE  NERVOUS SYSTEM: intubated, not waking up or following commands  SKIN: mottling noted on lower extremities, dusky toes and finger    LABS:  - focusing on comfort care, no lab draws    RADIOLOGY & ADDITIONAL TESTS:  *******************************  Ger Toney MD (PGY-1)  Internal Medicine  Contact via [employer] TEAMS  [facility] Pager: [phone]  [facility] Pager: [phone]  *******************************    INTERVAL HPI/OVERNIGHT EVENTS: No acute events overnight.    SUBJECTIVE: Patient seen and examined at bedside.     MEDICATIONS  (STANDING):  chlorhexidine 2% Cloths 1 Application(s) Topical daily  norepinephrine Infusion 0.05 MICROgram(s)/kG/Min (3.56 mL/Hr) IV Continuous <Continuous>  vasopressin Infusion 0.04 Unit(s)/Min (6 mL/Hr) IV Continuous <Continuous>    MEDICATIONS  (PRN):  acetaminophen     Tablet .. 650 milliGRAM(s) Oral every 6 hours PRN Temp greater or equal to 38C (100.4F), Mild Pain (1 - 3), Moderate Pain (4 - 6)  glycopyrrolate Injectable 0.2 milliGRAM(s) IV Push every 2 hours PRN Secretions  HYDROmorphone  Injectable 0.5 milliGRAM(s) IV Push every 2 hours PRN Moderate pain (4-6), Severe pain (7-10), Respiratory rate greater than 22  LORazepam   Injectable 2 milliGRAM(s) IV Push every 2 hours PRN Agitation    ALLERGIES:  Allergies    No Known Allergies    Intolerances    VITAL SIGNS:  ICU Vital Signs Last 24 Hrs  T(C): 37.3 (25 Jan 2023 04:00), Max: 37.3 (25 Jan 2023 04:00)  T(F): 99.1 (25 Jan 2023 04:00), Max: 99.1 (25 Jan 2023 04:00)  HR: 75 (25 Jan 2023 04:00) (75 - 87)  BP: --  BP(mean): --  ABP: 80/34 (25 Jan 2023 04:00) (80/34 - 120/51)  ABP(mean): 50 (25 Jan 2023 04:00) (50 - 74)  RR: 26 (25 Jan 2023 04:00) (26 - 28)  SpO2: 98% (25 Jan 2023 04:00) (92% - 99%)    O2 Parameters below as of 25 Jan 2023 04:00  Patient On (Oxygen Delivery Method): ventilator    O2 Concentration (%): 80    Mode: AC/ CMV (Assist Control/ Continuous Mandatory Ventilation), RR (machine): 26, TV (machine): 500, FiO2: 80, PEEP: 10, ITime: 0.8, MAP: 17, PIP: 32  Plateau pressure:   P/F ratio:     01-23 @ 07:01  -  01-24 @ 07:00  --------------------------------------------------------  IN: 1205.2 mL / OUT: 697 mL / NET: 508.2 mL    01-24 @ 07:01 - 01-25 @ 06:58  --------------------------------------------------------  IN: 1196 mL / OUT: 475 mL / NET: 721 mL    CAPILLARY BLOOD GLUCOSE    ECG:    PHYSICAL EXAM:  GENERAL: intubated but off sedation  HEENT: EOMI. PERRL  NECK: Supple, no JVD  HEART: S1, S2, regular rate and rhythm, no murmurs, rubs, or gallops  LUNGS: +b/l rales, rhonchi  ABDOMEN: Soft, nontender, nondistended, +BS  EXTREMITIES: nonpalpable peripheral pulses. 1+ pitting edema b/l LE and UE  NERVOUS SYSTEM: intubated, not waking up or following commands  SKIN: mottling noted on lower extremities, dusky toes and finger    LABS:  - focusing on comfort care, no lab draws    RADIOLOGY & ADDITIONAL TESTS:

## 2023-10-28 NOTE — PROGRESS NOTE ADULT - ASSESSMENT
====================ASSESSMENT======================  82M with HTN, DM2, HLD, Alzheimer's dementia who presented to the hospital initially for COVID PNA, also found to be in CHIARA s/p RRT for worsening AMS and hypoxemic respiratory failure, currently admitted to the MICU intubated and on multi-pressor support. CT chest w/ signs of multifocal PNA and likely aspiration as well as emphysema.    Plan:  ====================NEUROLOGY=======================  #Mental Status: AAOx1 on admission to hospital (baseline AAOx3)   - CT head negative on admission. Repeat CT head done (1/14) prior to MICU transfer with no acute changes.  - likely 2/2 metabolic encephalopathia i/s/o new COVID-19 pneumonia  - pt. currently intubated and sedated  - c/w propofol for sedation    ====================RESPIRATORY======================  #COVID PNA  - acute hypoxic respiratory failure/sepsis 2/2 COVID PNA   - PCR positive 1/13, home test positive 1/11  - holding Remdesivir in setting of kidney failure  - c/w dexamethasone 6mg qd (1/13- )  - pt. currently intubated and sedated  - vent settings: volume A/C, RR 26, , FiO2 40%, PEEP 10  - c/w propofol for sedation    #Multifocal PNA   - BCx (1/13): NGTD, MRSA negative, legionella negative  - likely 2/2 aspiration PNA  - CT chest: multifocal consolidations most predominant in the left upper and right  lower lobes which may be due to infection. Additional possibility of a prior aspiration event should also be entertained given that there is extensive endobronchial secretions and impaction in the dependent posterior portion of the right lower lobe and dependent portions of the right upper lobe.  - s/p Zosyn (1/13-1/15), vancomycin (1/13-1/15; d/c'd as MRSA negative) and azithromycin (1/14-1/16; d/c'd as Legionella negative)  - c/w meropenem (1/15- ) for now, likely 7d course    #Emphysema  - seen on CT chest by MICU team, likely 2/2 hx of smoking  - c/w Symbicort BID     ====================CARDIOVASCULAR==================  #Multifactorial Shock  - vasoplegic shock i/s/o sepsis +/- mild cardiogenic component, currently on 3 pressors  - wean pressors as tolerated  - holding home BP and CHF meds as below  - f/u cortisol level to r/o adrenal insufficiency  - monitor I/Os, daily weights, SCr, and urine output    #Hx of CHF  - diastolic disfunction grade 1-2 on POCUS  - TTE (1/15): calcified aortic valve, grossly moderate segmental LV systolic dysfunction, inferior and inferolateral wall hypokinesis. RV possibly hypokinetic   -holding home Entresto and torsemide in setting of CHIARA  - holding home carvedilol in setting of sepsis and hypotension     #HLD (hyperlipidemia).   - c/w atorvastatin 20 mg- will give meds    #HTN (hypertension)  - BP low on admission and patient is currently in shock and on multiple pressors  - holding home carvedilol, Entresto, amlodipine in setting of sepsis and hypotension  - continue to monitor BP    ====================/RENAL========================  #CHIARA on CKD  - baseline SCr 2.1 in 12/2022 per Nephrology. UA without evidence of infection, currently improving  - appreciate nephrology recommendations- due to pressor requirements and hypovolemia, will hold off on HD for now  - monitor I/Os, daily weights, SCr, and urine output    #Electrolytes  - Maintain K>4, Phos>3, Mag>2, iCal>1    ====================GI/NUTRITION=====================  #Diet:  - NPO w/ tube feeds  - bowel regimen: Senna, Miralax    #GI PPx:  - IV Protonix 40mg qd    ====================INFECTIOUS DISEASE================  Pt. currently afebrile and w/o leukocytosis   #COVID PNA  - acute hypoxic respiratory failure/sepsis 2/2 COVID PNA   - PCR positive 1/13, home test positive 1/11  - holding Remdesivir in setting of kidney failure  - c/w dexamethasone 6mg qd (1/13- )    #Multifocal PNA   - BCx (1/13): NGTD, MRSA negative, legionella negative  - likely 2/2 aspiration PNA  - s/p Zosyn (1/13-1/15), vancomycin (1/13-1/15; d/c'd as MRSA negative) and azithromycin (1/14-1/16; d/c'd as Legionella negative)  - c/w meropenem (1/15- )    ====================ENDOCRINE=======================  #DM2  - on Farxiga at home, unclear if for CHF vs DM  - A1c 6.3%  - while NPO, FSBG and moderate ISS q6h  - c/w NPH 5U q6h given persistent BG > 200s, likely 2/2 dexamethasone  - continue to monitor BGs    ====================HEMATOLOGIC/DVT PPx=============  #Thrombocytopenia  - plt 110 on admission, now downtrending, may be 2/2 HIT  - PF4 negative  - holding heparin for now    #DVT PPx  - holding for now given thrombocytopenia  - SCDs    ====================ETHICS===========================  Code Status: DNR  ====================ASSESSMENT======================  82M with HTN, DM2, HLD, Alzheimer's dementia who presented to the hospital initially for COVID PNA, also found to be in CHIARA s/p RRT for worsening AMS and hypoxemic respiratory failure, currently admitted to the MICU intubated and on multi-pressor support. CT chest w/ signs of multifocal PNA and likely aspiration as well as emphysema.    Plan:  ====================NEUROLOGY=======================  #Mental Status: AAOx1 on admission to hospital (baseline AAOx3)   - CT head negative on admission. Repeat CT head done (1/14) prior to MICU transfer with no acute changes.  - likely 2/2 metabolic encephalopathia i/s/o new COVID-19 pneumonia  - pt. currently intubated and sedated  - c/w propofol for sedation    ====================RESPIRATORY======================  #Multifocal PNA   - acute hypoxic respiratory failure/sepsis 2/2 multifocal pneumonia, likely 2/2 aspiration. Pt. currently intubated and sedated  - BCx (1/13): NGTD, MRSA negative, legionella negative  - CT chest: multifocal consolidations most predominant in the left upper and right  lower lobes which may be due to infection. Additional possibility of a prior aspiration event should also be entertained given that there is extensive endobronchial secretions and impaction in the dependent posterior portion of the right lower lobe and dependent portions of the right upper lobe  - vent settings: volume A/C, RR 26, , FiO2 40%, PEEP 10  - s/p Zosyn (1/13-1/15), vancomycin (1/13-1/15; d/c'd as MRSA negative) and azithromycin (1/14-1/16; d/c'd as Legionella negative)  - c/w meropenem (1/15- ) for now, likely 7d course  - c/w propofol for sedation for goal RASS 0 to -1    #COVID Infection  - PCR positive 1/13, home test positive 1/11  - holding Remdesivir in setting of kidney failure  - d/c'd dexamethasone 6mg qd (1/13-1/17)    #Emphysema  - seen on CT chest by MICU team, likely 2/2 hx of smoking  - c/w Symbicort BID     ====================CARDIOVASCULAR==================  #Multifactorial Shock  - vasoplegic shock i/s/o sepsis +/- mild cardiogenic component, currently on 3 pressors  - wean pressors as tolerated  - holding home BP and CHF meds as below  - cortisol level 63  - monitor I/Os, daily weights, SCr, and urine output    #Hx of CHF  - diastolic disfunction grade 1-2 on POCUS  - TTE (1/15): calcified aortic valve, grossly moderate segmental LV systolic dysfunction, inferior and inferolateral wall hypokinesis. RV possibly hypokinetic  - holding home Entresto and torsemide in setting of CHIARA  - holding home carvedilol in setting of sepsis and hypotension   - monitor I/Os, daily weights  - replete electrolytes for K>4, Mg>2    #HLD (hyperlipidemia).   - c/w atorvastatin 20 mg- will give meds    #HTN (hypertension)  - BP low on admission and patient is currently in shock and on multiple pressors  - holding home carvedilol, Entresto, amlodipine in setting of sepsis and hypotension  - continue to monitor BP    ====================/RENAL========================  #CHIARA on CKD  - baseline SCr 2.1 in 12/2022 per Nephrology. UA without evidence of infection, currently improving  - appreciate nephrology recommendations- due to pressor requirements and hypovolemia, will hold off on HD for now  - monitor I/Os, daily weights, SCr, and urine output    #Electrolytes  - Maintain K>4, Phos>3, Mag>2, iCal>1    ====================GI/NUTRITION=====================  #Diet:  - NPO w/ tube feeds  - bowel regimen: Senna, Miralax    ====================INFECTIOUS DISEASE================  Pt. currently afebrile and w/o leukocytosis  #COVID Infection  - PCR positive 1/13, home test positive 1/11  - holding Remdesivir in setting of kidney failure  - d/c'd dexamethasone 6mg qd (1/13-1/17)    #Multifocal PNA   - acute hypoxic respiratory failure/sepsis 2/2 multifocal pneumonia, likely 2/2 aspiration. Pt. currently intubated and sedated  - BCx (1/13): NGTD, MRSA negative, legionella negative  - CT chest: multifocal consolidations most predominant in the left upper and right  lower lobes which may be due to infection. Additional possibility of a prior aspiration event should also be entertained given that there is extensive endobronchial secretions and impaction in the dependent posterior portion of the right lower lobe and dependent portions of the right upper lobe  - vent settings: volume A/C, RR 26, , FiO2 40%, PEEP 10  - s/p Zosyn (1/13-1/15), vancomycin (1/13-1/15; d/c'd as MRSA negative) and azithromycin (1/14-1/16; d/c'd as Legionella negative)  - c/w meropenem (1/15- ) for now, likely 7d course  - c/w propofol for sedation for goal RASS 0 to -1    ====================ENDOCRINE=======================  #DM2  - on Farxiga at home, unclear if for CHF vs DM  - A1c 6.3%  - while NPO, FSBG and moderate ISS q6h  - c/w NPH 5U q6h given persistent BG > 200s, likely 2/2 dexamethasone  - continue to monitor BGs    ====================HEMATOLOGIC/DVT PPx=============  #Thrombocytopenia  - plt 110 on admission, now downtrending, may be 2/2 acute sepsis vs. less likely HIT given prior heparin use  - PF4 negative; f/u JASPREET  - will consent and transfuse 1U plt  - holding heparin for now    #DVT PPx  - holding for now given thrombocytopenia  - SCDs    ====================ETHICS===========================  Code Status: DNR  No

## 2023-12-06 NOTE — PATIENT PROFILE ADULT - FUNCTIONAL ASSESSMENT - BASIC MOBILITY 1.
Quality 137: Melanoma: Continuity Of Care - Recall System: Patient information entered into a recall system that includes: target date for the next exam specified AND a process to follow up with patients regarding missed or unscheduled appointments
Detail Level: Detailed
1 = Total assistance